# Patient Record
Sex: FEMALE | Race: WHITE | NOT HISPANIC OR LATINO | Employment: OTHER | URBAN - METROPOLITAN AREA
[De-identification: names, ages, dates, MRNs, and addresses within clinical notes are randomized per-mention and may not be internally consistent; named-entity substitution may affect disease eponyms.]

---

## 2023-07-03 LAB
ANION GAP IN SER/PLAS: 13 MMOL/L (ref 10–20)
CALCIUM (MG/DL) IN SER/PLAS: 9.2 MG/DL (ref 8.6–10.3)
CARBON DIOXIDE, TOTAL (MMOL/L) IN SER/PLAS: 24 MMOL/L (ref 21–32)
CHLORIDE (MMOL/L) IN SER/PLAS: 107 MMOL/L (ref 98–107)
CREATININE (MG/DL) IN SER/PLAS: 0.92 MG/DL (ref 0.5–1.05)
GFR FEMALE: 60 ML/MIN/1.73M2
GLUCOSE (MG/DL) IN SER/PLAS: 209 MG/DL (ref 74–99)
POTASSIUM (MMOL/L) IN SER/PLAS: 4.3 MMOL/L (ref 3.5–5.3)
SODIUM (MMOL/L) IN SER/PLAS: 140 MMOL/L (ref 136–145)
UREA NITROGEN (MG/DL) IN SER/PLAS: 26 MG/DL (ref 6–23)

## 2023-08-28 PROBLEM — B35.1 ONYCHOMYCOSIS: Status: ACTIVE | Noted: 2023-08-28

## 2023-08-28 PROBLEM — M20.11 HAV (HALLUX ABDUCTO VALGUS), RIGHT: Status: ACTIVE | Noted: 2023-08-28

## 2023-08-28 PROBLEM — H81.391 OTHER PERIPHERAL VERTIGO, RIGHT EAR: Status: ACTIVE | Noted: 2023-08-28

## 2023-08-28 PROBLEM — I34.0 MITRAL REGURGITATION: Status: ACTIVE | Noted: 2023-08-28

## 2023-08-28 PROBLEM — L60.1 ONYCHOLYSIS OF TOENAIL: Status: ACTIVE | Noted: 2023-08-28

## 2023-08-28 PROBLEM — E07.9 THYROID DISEASE: Status: ACTIVE | Noted: 2023-08-28

## 2023-08-28 PROBLEM — I71.40 AAA (ABDOMINAL AORTIC ANEURYSM) (CMS-HCC): Status: ACTIVE | Noted: 2023-08-28

## 2023-08-28 PROBLEM — I77.9 CAROTID ARTERIAL DISEASE (CMS-HCC): Status: ACTIVE | Noted: 2023-08-28

## 2023-08-28 PROBLEM — R63.4 WEIGHT LOSS, ABNORMAL: Status: ACTIVE | Noted: 2023-08-28

## 2023-08-28 PROBLEM — E78.5 HYPERLIPIDEMIA: Status: ACTIVE | Noted: 2023-08-28

## 2023-08-28 PROBLEM — M79.671 FOOT PAIN, BILATERAL: Status: ACTIVE | Noted: 2023-08-28

## 2023-08-28 PROBLEM — M79.672 FOOT PAIN, BILATERAL: Status: ACTIVE | Noted: 2023-08-28

## 2023-08-28 PROBLEM — M19.072 OSTEOARTHRITIS OF LEFT ANKLE AND FOOT: Status: ACTIVE | Noted: 2023-08-28

## 2023-08-28 PROBLEM — I25.10 CAD (CORONARY ARTERY DISEASE): Status: ACTIVE | Noted: 2023-08-28

## 2023-08-28 PROBLEM — M20.12 HAV (HALLUX ABDUCTO VALGUS), LEFT: Status: ACTIVE | Noted: 2023-08-28

## 2023-08-28 PROBLEM — I87.2 VENOUS INSUFFICIENCY (CHRONIC) (PERIPHERAL): Status: ACTIVE | Noted: 2023-08-28

## 2023-08-28 PROBLEM — R42 DIZZINESS: Status: ACTIVE | Noted: 2023-08-28

## 2023-08-28 PROBLEM — H90.3 SENSORINEURAL HEARING LOSS, BILATERAL: Status: ACTIVE | Noted: 2023-08-28

## 2023-08-28 PROBLEM — I61.4: Status: ACTIVE | Noted: 2023-08-28

## 2023-08-28 PROBLEM — I65.23 BILATERAL CAROTID ARTERY STENOSIS: Status: ACTIVE | Noted: 2023-08-28

## 2023-08-28 PROBLEM — M19.071 OSTEOARTHRITIS OF RIGHT ANKLE AND FOOT: Status: ACTIVE | Noted: 2023-08-28

## 2023-08-28 PROBLEM — I83.90 VARICOSE VEIN OF LEG: Status: ACTIVE | Noted: 2023-08-28

## 2023-08-28 PROBLEM — M54.2 NECK PAIN ON LEFT SIDE: Status: ACTIVE | Noted: 2023-08-28

## 2023-08-28 PROBLEM — L84 CALLUS OF FOOT: Status: ACTIVE | Noted: 2023-08-28

## 2023-08-28 PROBLEM — I50.32 CHRONIC DIASTOLIC CONGESTIVE HEART FAILURE (MULTI): Status: ACTIVE | Noted: 2023-08-28

## 2023-08-28 PROBLEM — I48.91 ATRIAL FIBRILLATION (MULTI): Status: ACTIVE | Noted: 2023-08-28

## 2023-08-28 PROBLEM — E78.00 HYPERCHOLESTEROLEMIA: Status: ACTIVE | Noted: 2023-08-28

## 2023-08-28 RX ORDER — TRIAMCINOLONE ACETONIDE 1 MG/G
CREAM TOPICAL
COMMUNITY

## 2023-08-28 RX ORDER — LORATADINE 10 MG/1
10 TABLET ORAL EVERY MORNING
COMMUNITY
End: 2023-10-19 | Stop reason: WASHOUT

## 2023-08-28 RX ORDER — FUROSEMIDE 40 MG/1
40 TABLET ORAL EVERY MORNING
COMMUNITY
End: 2023-12-08 | Stop reason: SDUPTHER

## 2023-08-28 RX ORDER — LEVOFLOXACIN 250 MG/1
1 TABLET ORAL EVERY MORNING
COMMUNITY
Start: 2022-06-15 | End: 2023-10-19 | Stop reason: WASHOUT

## 2023-08-28 RX ORDER — ATORVASTATIN CALCIUM 20 MG/1
20 TABLET, FILM COATED ORAL EVERY MORNING
COMMUNITY

## 2023-08-28 RX ORDER — ATENOLOL 50 MG/1
50 TABLET ORAL EVERY MORNING
COMMUNITY
End: 2023-10-19 | Stop reason: WASHOUT

## 2023-08-28 RX ORDER — WARFARIN 1 MG/1
1 TABLET ORAL
COMMUNITY
End: 2023-10-19 | Stop reason: WASHOUT

## 2023-08-28 RX ORDER — SENNOSIDES 8.6 MG/1
1 TABLET ORAL
COMMUNITY
Start: 2019-01-28 | End: 2023-10-19 | Stop reason: WASHOUT

## 2023-08-28 RX ORDER — CETIRIZINE HYDROCHLORIDE 5 MG/1
5 TABLET ORAL
COMMUNITY
End: 2023-10-19 | Stop reason: WASHOUT

## 2023-08-28 RX ORDER — MIRTAZAPINE 7.5 MG/1
7.5 TABLET, FILM COATED ORAL NIGHTLY
COMMUNITY
End: 2023-10-19 | Stop reason: WASHOUT

## 2023-08-28 RX ORDER — ALBUTEROL SULFATE 90 UG/1
2 AEROSOL, METERED RESPIRATORY (INHALATION) EVERY 6 HOURS PRN
COMMUNITY

## 2023-08-28 RX ORDER — OMEPRAZOLE 20 MG/1
20 CAPSULE, DELAYED RELEASE ORAL EVERY MORNING
COMMUNITY
Start: 2017-01-26

## 2023-08-28 RX ORDER — ERYTHROMYCIN 5 MG/G
OINTMENT OPHTHALMIC 4 TIMES DAILY
COMMUNITY
Start: 2022-12-14

## 2023-08-28 RX ORDER — REPAGLINIDE 1 MG/1
1 TABLET ORAL DAILY
COMMUNITY
Start: 2020-06-30 | End: 2023-10-19 | Stop reason: WASHOUT

## 2023-08-28 RX ORDER — ALBUTEROL SULFATE 0.83 MG/ML
2.5 SOLUTION RESPIRATORY (INHALATION) EVERY 6 HOURS
COMMUNITY

## 2023-08-28 RX ORDER — METOPROLOL TARTRATE 25 MG/1
25 TABLET, FILM COATED ORAL 2 TIMES DAILY
COMMUNITY
Start: 2023-05-08

## 2023-08-28 RX ORDER — WARFARIN 2 MG/1
2 TABLET ORAL
COMMUNITY
End: 2023-10-19 | Stop reason: WASHOUT

## 2023-08-28 RX ORDER — LANOLIN ALCOHOL/MO/W.PET/CERES
1000 CREAM (GRAM) TOPICAL EVERY MORNING
COMMUNITY
End: 2023-10-19 | Stop reason: WASHOUT

## 2023-08-28 RX ORDER — LOSARTAN POTASSIUM 25 MG/1
25 TABLET ORAL EVERY MORNING
COMMUNITY
End: 2023-10-19 | Stop reason: WASHOUT

## 2023-08-28 RX ORDER — NITROGLYCERIN 0.4 MG/1
0.4 TABLET SUBLINGUAL EVERY 5 MIN PRN
COMMUNITY

## 2023-08-28 RX ORDER — ALLOPURINOL 100 MG/1
1 TABLET ORAL DAILY
COMMUNITY
End: 2023-10-19 | Stop reason: WASHOUT

## 2023-08-28 RX ORDER — WARFARIN 2.5 MG/1
2.5 TABLET ORAL
COMMUNITY

## 2023-08-28 RX ORDER — COLCHICINE 0.6 MG/1
1 CAPSULE ORAL 2 TIMES DAILY
COMMUNITY
Start: 2022-12-13 | End: 2023-10-19 | Stop reason: WASHOUT

## 2023-08-28 RX ORDER — COLCHICINE 0.6 MG/1
0.3 TABLET ORAL
COMMUNITY
Start: 2022-12-20 | End: 2023-10-19 | Stop reason: WASHOUT

## 2023-08-28 RX ORDER — NAPROXEN SODIUM 220 MG/1
1200 TABLET ORAL
COMMUNITY

## 2023-08-28 RX ORDER — DEXTROMETHORPHAN HYDROBROMIDE, GUAIFENESIN 5; 100 MG/5ML; MG/5ML
650 LIQUID ORAL
COMMUNITY
Start: 2019-01-28

## 2023-08-28 RX ORDER — AMLODIPINE BESYLATE 5 MG/1
5 TABLET ORAL EVERY MORNING
COMMUNITY

## 2023-08-28 RX ORDER — FUROSEMIDE 20 MG/1
1 TABLET ORAL DAILY
COMMUNITY
End: 2023-10-19 | Stop reason: WASHOUT

## 2023-08-28 RX ORDER — PRAVASTATIN SODIUM 80 MG/1
80 TABLET ORAL EVERY MORNING
COMMUNITY
End: 2023-10-19 | Stop reason: WASHOUT

## 2023-08-28 RX ORDER — DEXAMETHASONE 6 MG/1
6 TABLET ORAL EVERY MORNING
COMMUNITY
Start: 2022-07-20 | End: 2023-10-19 | Stop reason: WASHOUT

## 2023-08-28 RX ORDER — LEVOTHYROXINE SODIUM 75 UG/1
75 TABLET ORAL EVERY MORNING
COMMUNITY
Start: 2023-02-07

## 2023-08-28 RX ORDER — FLUTICASONE PROPIONATE 50 MCG
1 SPRAY, SUSPENSION (ML) NASAL EVERY MORNING
COMMUNITY

## 2023-08-28 RX ORDER — SIMVASTATIN 80 MG/1
80 TABLET, FILM COATED ORAL EVERY EVENING
COMMUNITY
End: 2023-10-19 | Stop reason: WASHOUT

## 2023-10-18 ENCOUNTER — APPOINTMENT (OUTPATIENT)
Dept: RADIOLOGY | Facility: HOSPITAL | Age: 88
End: 2023-10-18
Payer: COMMERCIAL

## 2023-10-18 ENCOUNTER — HOSPITAL ENCOUNTER (EMERGENCY)
Facility: HOSPITAL | Age: 88
Discharge: HOME | End: 2023-10-18
Attending: EMERGENCY MEDICINE
Payer: COMMERCIAL

## 2023-10-18 VITALS
DIASTOLIC BLOOD PRESSURE: 78 MMHG | HEIGHT: 59 IN | OXYGEN SATURATION: 98 % | HEART RATE: 72 BPM | TEMPERATURE: 96.8 F | BODY MASS INDEX: 24.8 KG/M2 | RESPIRATION RATE: 16 BRPM | WEIGHT: 123 LBS | SYSTOLIC BLOOD PRESSURE: 127 MMHG

## 2023-10-18 DIAGNOSIS — Z79.01 ON CONTINUOUS ORAL ANTICOAGULATION: ICD-10-CM
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
## 2023-10-18 DIAGNOSIS — S09.90XA HEAD INJURY, INITIAL ENCOUNTER: ICD-10-CM

## 2023-10-18 LAB
ABO GROUP (TYPE) IN BLOOD: NORMAL
ALBUMIN SERPL BCP-MCNC: 4.3 G/DL (ref 3.4–5)
ALP SERPL-CCNC: 97 U/L (ref 33–136)
ALT SERPL W P-5'-P-CCNC: 13 U/L (ref 7–45)
ANION GAP SERPL CALC-SCNC: 13 MMOL/L (ref 10–20)
ANTIBODY SCREEN: NORMAL
AST SERPL W P-5'-P-CCNC: 25 U/L (ref 9–39)
BASOPHILS # BLD AUTO: 0.03 X10*3/UL (ref 0–0.1)
BASOPHILS NFR BLD AUTO: 0.4 %
BILIRUB SERPL-MCNC: 0.8 MG/DL (ref 0–1.2)
BUN SERPL-MCNC: 28 MG/DL (ref 6–23)
CALCIUM SERPL-MCNC: 9.3 MG/DL (ref 8.6–10.3)
CHLORIDE SERPL-SCNC: 102 MMOL/L (ref 98–107)
CO2 SERPL-SCNC: 27 MMOL/L (ref 21–32)
CREAT SERPL-MCNC: 0.9 MG/DL (ref 0.5–1.05)
CREAT SERPL-MCNC: 0.92 MG/DL (ref 0.5–1.05)
EOSINOPHIL # BLD AUTO: 0.12 X10*3/UL (ref 0–0.4)
EOSINOPHIL NFR BLD AUTO: 1.7 %
ERYTHROCYTE [DISTWIDTH] IN BLOOD BY AUTOMATED COUNT: 14.9 % (ref 11.5–14.5)
GFR SERPL CREATININE-BSD FRML MDRD: 60 ML/MIN/1.73M*2
GFR SERPL CREATININE-BSD FRML MDRD: 62 ML/MIN/1.73M*2
GLUCOSE SERPL-MCNC: 192 MG/DL (ref 74–99)
HCT VFR BLD AUTO: 38.7 % (ref 36–46)
HGB BLD-MCNC: 12.8 G/DL (ref 12–16)
IMM GRANULOCYTES # BLD AUTO: 0.03 X10*3/UL (ref 0–0.5)
IMM GRANULOCYTES NFR BLD AUTO: 0.4 % (ref 0–0.9)
INR PPP: 3.8 (ref 0.9–1.1)
LYMPHOCYTES # BLD AUTO: 0.86 X10*3/UL (ref 0.8–3)
LYMPHOCYTES NFR BLD AUTO: 12.5 %
MCH RBC QN AUTO: 31.1 PG (ref 26–34)
MCHC RBC AUTO-ENTMCNC: 33.1 G/DL (ref 32–36)
MCV RBC AUTO: 94 FL (ref 80–100)
MONOCYTES # BLD AUTO: 0.63 X10*3/UL (ref 0.05–0.8)
MONOCYTES NFR BLD AUTO: 9.2 %
NEUTROPHILS # BLD AUTO: 5.21 X10*3/UL (ref 1.6–5.5)
NEUTROPHILS NFR BLD AUTO: 75.8 %
NRBC BLD-RTO: 0 /100 WBCS (ref 0–0)
PLATELET # BLD AUTO: 171 X10*3/UL (ref 150–450)
PMV BLD AUTO: 9.8 FL (ref 7.5–11.5)
POTASSIUM SERPL-SCNC: 3.9 MMOL/L (ref 3.5–5.3)
PROT SERPL-MCNC: 6.3 G/DL (ref 6.4–8.2)
PROTHROMBIN TIME: 43 SECONDS (ref 9.8–12.8)
RBC # BLD AUTO: 4.11 X10*6/UL (ref 4–5.2)
RH FACTOR (ANTIGEN D): NORMAL
SODIUM SERPL-SCNC: 138 MMOL/L (ref 136–145)
WBC # BLD AUTO: 6.9 X10*3/UL (ref 4.4–11.3)

## 2023-10-18 PROCEDURE — 99285 EMERGENCY DEPT VISIT HI MDM: CPT | Performed by: EMERGENCY MEDICINE

## 2023-10-18 PROCEDURE — 71250 CT THORAX DX C-: CPT | Mod: MG

## 2023-10-18 PROCEDURE — 99285 EMERGENCY DEPT VISIT HI MDM: CPT | Mod: 25 | Performed by: EMERGENCY MEDICINE

## 2023-10-18 PROCEDURE — 99291 CRITICAL CARE FIRST HOUR: CPT | Performed by: EMERGENCY MEDICINE

## 2023-10-18 PROCEDURE — 85610 PROTHROMBIN TIME: CPT | Performed by: EMERGENCY MEDICINE

## 2023-10-18 PROCEDURE — 80053 COMPREHEN METABOLIC PANEL: CPT | Performed by: EMERGENCY MEDICINE

## 2023-10-18 PROCEDURE — 72125 CT NECK SPINE W/O DYE: CPT | Performed by: RADIOLOGY

## 2023-10-18 PROCEDURE — 70450 CT HEAD/BRAIN W/O DYE: CPT

## 2023-10-18 PROCEDURE — 82565 ASSAY OF CREATININE: CPT | Performed by: EMERGENCY MEDICINE

## 2023-10-18 PROCEDURE — 70450 CT HEAD/BRAIN W/O DYE: CPT | Performed by: RADIOLOGY

## 2023-10-18 PROCEDURE — 36415 COLL VENOUS BLD VENIPUNCTURE: CPT | Performed by: EMERGENCY MEDICINE

## 2023-10-18 PROCEDURE — 71250 CT THORAX DX C-: CPT | Performed by: RADIOLOGY

## 2023-10-18 PROCEDURE — 86900 BLOOD TYPING SEROLOGIC ABO: CPT | Performed by: EMERGENCY MEDICINE

## 2023-10-18 PROCEDURE — 72125 CT NECK SPINE W/O DYE: CPT | Mod: ME

## 2023-10-18 PROCEDURE — 85025 COMPLETE CBC W/AUTO DIFF WBC: CPT | Performed by: EMERGENCY MEDICINE

## 2023-10-18 ASSESSMENT — COLUMBIA-SUICIDE SEVERITY RATING SCALE - C-SSRS
6. HAVE YOU EVER DONE ANYTHING, STARTED TO DO ANYTHING, OR PREPARED TO DO ANYTHING TO END YOUR LIFE?: NO
1. IN THE PAST MONTH, HAVE YOU WISHED YOU WERE DEAD OR WISHED YOU COULD GO TO SLEEP AND NOT WAKE UP?: NO
2. HAVE YOU ACTUALLY HAD ANY THOUGHTS OF KILLING YOURSELF?: NO

## 2023-10-18 ASSESSMENT — LIFESTYLE VARIABLES
HAVE PEOPLE ANNOYED YOU BY CRITICIZING YOUR DRINKING: NO
REASON UNABLE TO ASSESS: YES
HAVE YOU EVER FELT YOU SHOULD CUT DOWN ON YOUR DRINKING: NO
EVER FELT BAD OR GUILTY ABOUT YOUR DRINKING: NO
EVER HAD A DRINK FIRST THING IN THE MORNING TO STEADY YOUR NERVES TO GET RID OF A HANGOVER: NO

## 2023-10-18 ASSESSMENT — PAIN - FUNCTIONAL ASSESSMENT: PAIN_FUNCTIONAL_ASSESSMENT: 0-10

## 2023-10-18 ASSESSMENT — PAIN SCALES - GENERAL: PAINLEVEL_OUTOF10: 0 - NO PAIN

## 2023-10-18 NOTE — ED PROVIDER NOTES
HPI   Chief Complaint   Patient presents with    Fall       88-year-old female who presents as a head injury and fall.  Patient is on Coumadin daily.  Patient states today she went to stand up when she fell backwards hitting her head on the floor.  She denies any LOC.  Patient does take Coumadin daily.  She is on chronic oxygen at home.  She denies any neck or back pain.  Denies any chest pain or shortness of breath.                          [place] Coma Scale Score: 15                  Patient History   Past Medical History:   Diagnosis Date    Old myocardial infarction     History of myocardial infarction    Personal history of other diseases of the circulatory system     History of coronary artery disease    Personal history of other diseases of the circulatory system 06/30/2020    History of hypertension    Personal history of other diseases of the circulatory system     History of hypertension    Personal history of other diseases of the musculoskeletal system and connective tissue     History of arthritis    Personal history of other diseases of the respiratory system     History of lung disease    Personal history of other endocrine, nutritional and metabolic disease     History of thyroid disorder    Personal history of other endocrine, nutritional and metabolic disease 09/01/2016    History of type 2 diabetes mellitus    Personal history of other endocrine, nutritional and metabolic disease     History of hyperlipidemia    Personal history of other endocrine, nutritional and metabolic disease     History of hypothyroidism    Unspecified cataract     Cataracts, bilateral    Unspecified hearing loss, right ear 01/12/2019    Hearing loss on right     Past Surgical History:   Procedure Laterality Date    APPENDECTOMY  09/01/2016    Appendectomy    CORONARY ARTERY BYPASS GRAFT  09/01/2016    CABG    OTHER SURGICAL HISTORY  09/01/2016    Endarterectomy Carotid Artery    TONSILLECTOMY  09/01/2016    Tonsillectomy  With Adenoidectomy     Family History   Problem Relation Name Age of Onset    Stroke Father      Hypertension Father      Diabetes Sister      Diabetes Brother      Hypertension Brother       Social History     Tobacco Use    Smoking status: Not on file    Smokeless tobacco: Not on file   Substance Use Topics    Alcohol use: Not on file    Drug use: Not on file       Physical Exam   ED Triage Vitals [10/18/23 1043]   Temp Heart Rate Resp BP   36 °C (96.8 °F) 68 16 147/87      SpO2 Temp Source Heart Rate Source Patient Position   93 % Temporal -- --      BP Location FiO2 (%)     -- --       Physical Exam  Constitutional:       Appearance: Normal appearance. She is normal weight.   HENT:      Head: Normocephalic.      Comments: Cephalhematoma noted to the left parietal area.  There is no abrasions or lacerations noted.     Nose: Nose normal.      Mouth/Throat:      Mouth: Mucous membranes are moist.      Pharynx: Oropharynx is clear.   Eyes:      Extraocular Movements: Extraocular movements intact.      Conjunctiva/sclera: Conjunctivae normal.      Pupils: Pupils are equal, round, and reactive to light.   Cardiovascular:      Rate and Rhythm: Normal rate and regular rhythm.   Pulmonary:      Effort: Pulmonary effort is normal.      Breath sounds: Normal breath sounds.   Abdominal:      General: Abdomen is flat. Bowel sounds are normal.      Palpations: Abdomen is soft.   Musculoskeletal:         General: Normal range of motion.      Cervical back: Normal range of motion and neck supple.   Skin:     General: Skin is warm and dry.      Capillary Refill: Capillary refill takes less than 2 seconds.   Neurological:      General: No focal deficit present.      Mental Status: She is alert.   Psychiatric:         Mood and Affect: Mood normal.         Behavior: Behavior normal.         Thought Content: Thought content normal.         Judgment: Judgment normal.       Labs Reviewed   CBC WITH AUTO DIFFERENTIAL - Abnormal        Result Value    WBC 6.9      nRBC 0.0      RBC 4.11      Hemoglobin 12.8      Hematocrit 38.7      MCV 94      MCH 31.1      MCHC 33.1      RDW 14.9 (*)     Platelets 171      MPV 9.8      Neutrophils % 75.8      Immature Granulocytes %, Automated 0.4      Lymphocytes % 12.5      Monocytes % 9.2      Eosinophils % 1.7      Basophils % 0.4      Neutrophils Absolute 5.21      Immature Granulocytes Absolute, Automated 0.03      Lymphocytes Absolute 0.86      Monocytes Absolute 0.63      Eosinophils Absolute 0.12      Basophils Absolute 0.03     COMPREHENSIVE METABOLIC PANEL - Abnormal    Glucose 192 (*)     Sodium 138      Potassium 3.9      Chloride 102      Bicarbonate 27      Anion Gap 13      Urea Nitrogen 28 (*)     Creatinine 0.90      eGFR 62      Calcium 9.3      Albumin 4.3      Alkaline Phosphatase 97      Total Protein 6.3 (*)     AST 25      Bilirubin, Total 0.8      ALT 13     PROTIME-INR - Abnormal    Protime 43.0 (*)     INR 3.8 (*)    CREATININE - Abnormal    Creatinine 0.92      eGFR 60 (*)    TYPE AND SCREEN    ABO TYPE O      Rh TYPE POS      ANTIBODY SCREEN NEG         CT chest wo IV contrast   Final Result   1. Moderate-size right and small left pleural effusions.   2. Pulmonary fibrosis, likely with acute superimposed interstitial   edema.   3. 3.9 x 4.0 cm ascending aortic aneurysm.   4. Possible pulmonary artery hypertension.        MACRO:   None        Signed by: Jessenia Boyd 10/18/2023 11:41 AM   Dictation workstation:   [id number]      CT head W O contrast trauma protocol   Final Result   No acute intracranial pathology.        MACRO:   None        Signed by: Jessenia Boyd 10/18/2023 11:34 AM   Dictation workstation:   [id number]      CT cervical spine wo IV contrast   Final Result   No evidence for an acute fracture of the cervical spine.   3 mm anterior subluxation of C4 with respect to C5, likely   degenerative. Mild curvature of the cervical spine with convexity to   the right,  seen on 05/06/2023. This could represent scoliosis or   recurrent torticollis.        MACRO:   None        Signed by: Jessenia Andersonier 10/18/2023 11:32 AM   Dictation workstation:   [id number]            ED Course & MDM   Diagnoses as of 10/18/23 1333   Fall, initial encounter   Head injury, initial encounter   On continuous oral anticoagulation       Medical Decision Making  Emergency department course, CT of the head and cervical spine were obtained which showed no acute traumatic injuries.  Chest CT was obtained secondary to visualizing a pleural effusion on the CT of the neck.  There is a moderate size right-sided pleural effusion and a small left.  Patient is on Lasix that is being adjusted by cardiology.  She does wear chronic oxygen at home.  Laboratory studies were obtained and reviewed hemoglobin of 12.3 with a INR of 3.8.  I feel comfortable discharging her home to follow-up as an outpatient.  Family is agreeable with this plan.        Procedure  Critical Care    Performed by: Renee Chaparro DO  Authorized by: Renee Chaparro DO    Critical care provider statement:     Critical care time (minutes):  35    Critical care was necessary to treat or prevent imminent or life-threatening deterioration of the following conditions:  Trauma    Critical care was time spent personally by me on the following activities:  Development of treatment plan with patient or surrogate, evaluation of patient's response to treatment and examination of patient    I assumed direction of critical care for this patient from another provider in my specialty: no         Renee Chaparro DO  10/18/23 1428

## 2023-10-18 NOTE — ED TRIAGE NOTES
Pt in ER with c/o fall. Pt was at the coumadin clinic got dizzy fell backwards and hit her head. Pt is on blood thinners denies LOC but did hit her head.

## 2023-10-19 ENCOUNTER — OFFICE VISIT (OUTPATIENT)
Dept: CARDIOLOGY | Facility: CLINIC | Age: 88
End: 2023-10-19
Payer: COMMERCIAL

## 2023-10-19 VITALS
HEART RATE: 71 BPM | HEIGHT: 64 IN | SYSTOLIC BLOOD PRESSURE: 126 MMHG | WEIGHT: 126.4 LBS | DIASTOLIC BLOOD PRESSURE: 82 MMHG | OXYGEN SATURATION: 93 % | BODY MASS INDEX: 21.58 KG/M2

## 2023-10-19 DIAGNOSIS — Z98.890 S/P BILATERAL CAROTID ENDARTERECTOMY: ICD-10-CM

## 2023-10-19 DIAGNOSIS — I25.10 CORONARY ARTERY DISEASE INVOLVING NATIVE CORONARY ARTERY OF NATIVE HEART WITHOUT ANGINA PECTORIS: ICD-10-CM
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
## 2023-10-19 DIAGNOSIS — I34.0 SEVERE MITRAL REGURGITATION: ICD-10-CM

## 2023-10-19 DIAGNOSIS — E78.00 HYPERCHOLESTEROLEMIA: ICD-10-CM

## 2023-10-19 DIAGNOSIS — I48.11 LONGSTANDING PERSISTENT ATRIAL FIBRILLATION (MULTI): Primary | ICD-10-CM

## 2023-10-19 DIAGNOSIS — I50.32 CHRONIC DIASTOLIC CONGESTIVE HEART FAILURE (MULTI): ICD-10-CM

## 2023-10-19 DIAGNOSIS — I10 PRIMARY HYPERTENSION: ICD-10-CM

## 2023-10-19 PROCEDURE — 1159F MED LIST DOCD IN RCRD: CPT | Performed by: INTERNAL MEDICINE

## 2023-10-19 PROCEDURE — 3079F DIAST BP 80-89 MM HG: CPT | Performed by: INTERNAL MEDICINE

## 2023-10-19 PROCEDURE — 3074F SYST BP LT 130 MM HG: CPT | Performed by: INTERNAL MEDICINE

## 2023-10-19 PROCEDURE — 1036F TOBACCO NON-USER: CPT | Performed by: INTERNAL MEDICINE

## 2023-10-19 PROCEDURE — 99214 OFFICE O/P EST MOD 30 MIN: CPT | Performed by: INTERNAL MEDICINE

## 2023-10-19 PROCEDURE — 1160F RVW MEDS BY RX/DR IN RCRD: CPT | Performed by: INTERNAL MEDICINE

## 2023-10-19 PROCEDURE — 1126F AMNT PAIN NOTED NONE PRSNT: CPT | Performed by: INTERNAL MEDICINE

## 2023-10-19 RX ORDER — CETIRIZINE HYDROCHLORIDE 10 MG/1
TABLET ORAL
COMMUNITY
Start: 2023-07-03

## 2023-10-19 RX ORDER — POLYETHYLENE GLYCOL 3350 17 G/17G
POWDER, FOR SOLUTION ORAL
COMMUNITY
Start: 2023-02-06

## 2023-10-19 RX ORDER — ESCITALOPRAM OXALATE 5 MG/1
5 TABLET, FILM COATED ORAL
COMMUNITY
Start: 2023-09-27

## 2023-10-19 NOTE — PROGRESS NOTES
Had mechanical fall 10/17/23 with head trauma seen in [facility] ER no acute cranial bleed.  Coughing less but has hypoxia with acitivty      Review of Systems   All other systems reviewed and are negative.       Physical Exam  HENT:      Head: Normocephalic.   Cardiovascular:      Rate and Rhythm: Rhythm irregular.   Pulmonary:      Breath sounds: Normal breath sounds.   Abdominal:      Palpations: Abdomen is soft.   Musculoskeletal:      Right lower le+ Pitting Edema present.      Left lower le+ Edema present.   Neurological:      Mental Status: She is alert.          Problem List Items Addressed This Visit          Cardiac and Vasculature    Atrial fibrillation (CMS/HCC) - Primary    CAD (coronary artery disease)    Current Assessment & Plan     S/P CABG 1988 [facility] by Dr. Solomon         Chronic diastolic congestive heart failure (CMS/HCC)    Relevant Orders    Follow Up In Cardiology    Follow Up In Cardiology    Basic metabolic panel    Hypercholesterolemia    Severe mitral regurgitation    Current Assessment & Plan     Declined by Dr. Richey for MitraClip 22         S/p bilateral carotid endarterectomy    Primary hypertension       Musculoskeletal and Injuries    Accident due to mechanical fall without injury

## 2023-10-20 ENCOUNTER — APPOINTMENT (OUTPATIENT)
Dept: PHARMACY | Facility: CLINIC | Age: 88
End: 2023-10-20
Payer: COMMERCIAL

## 2023-10-21 PROBLEM — Z98.890 S/P BILATERAL CAROTID ENDARTERECTOMY: Status: ACTIVE | Noted: 2023-10-21
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
## 2023-10-21 PROBLEM — I10 PRIMARY HYPERTENSION: Status: ACTIVE | Noted: 2023-10-21

## 2023-11-01 ENCOUNTER — LAB (OUTPATIENT)
Dept: LAB | Facility: LAB | Age: 88
End: 2023-11-01
Payer: COMMERCIAL

## 2023-11-01 DIAGNOSIS — I50.32 CHRONIC DIASTOLIC CONGESTIVE HEART FAILURE (MULTI): ICD-10-CM

## 2023-11-01 LAB
ANION GAP SERPL CALC-SCNC: 12 MMOL/L (ref 10–20)
BUN SERPL-MCNC: 22 MG/DL (ref 6–23)
CALCIUM SERPL-MCNC: 9.4 MG/DL (ref 8.6–10.3)
CHLORIDE SERPL-SCNC: 103 MMOL/L (ref 98–107)
CO2 SERPL-SCNC: 31 MMOL/L (ref 21–32)
CREAT SERPL-MCNC: 1.14 MG/DL (ref 0.5–1.05)
GFR SERPL CREATININE-BSD FRML MDRD: 46 ML/MIN/1.73M*2
GLUCOSE SERPL-MCNC: 158 MG/DL (ref 74–99)
POTASSIUM SERPL-SCNC: 4.1 MMOL/L (ref 3.5–5.3)
SODIUM SERPL-SCNC: 142 MMOL/L (ref 136–145)

## 2023-11-01 PROCEDURE — 36415 COLL VENOUS BLD VENIPUNCTURE: CPT

## 2023-11-01 PROCEDURE — 80048 BASIC METABOLIC PNL TOTAL CA: CPT

## 2023-11-16 ENCOUNTER — APPOINTMENT (OUTPATIENT)
Dept: CARDIOLOGY | Facility: CLINIC | Age: 88
End: 2023-11-16
Payer: COMMERCIAL

## 2023-11-20 ENCOUNTER — APPOINTMENT (OUTPATIENT)
Dept: CARDIOLOGY | Facility: CLINIC | Age: 88
End: 2023-11-20
Payer: COMMERCIAL

## 2023-11-30 ENCOUNTER — APPOINTMENT (OUTPATIENT)
Dept: CARDIOLOGY | Facility: CLINIC | Age: 88
End: 2023-11-30
Payer: COMMERCIAL

## 2023-12-01 PROBLEM — Z86.73 S/P STROKE DUE TO CEREBROVASCULAR DISEASE: Status: ACTIVE | Noted: 2023-12-01

## 2023-12-01 PROBLEM — U07.1 COVID-19 VIRUS INFECTION: Status: ACTIVE | Noted: 2023-12-01

## 2023-12-01 PROBLEM — H26.9 BILATERAL CATARACTS: Status: ACTIVE | Noted: 2023-12-01

## 2023-12-01 PROBLEM — U07.1 PNEUMONIA DUE TO COVID-19 VIRUS: Status: ACTIVE | Noted: 2023-12-01

## 2023-12-01 PROBLEM — J96.10 CHRONIC RESPIRATORY FAILURE (MULTI): Status: ACTIVE | Noted: 2023-09-27

## 2023-12-01 PROBLEM — G47.34 IDIOPATHIC SLEEP RELATED NONOBSTRUCTIVE ALVEOLAR HYPOVENTILATION: Status: ACTIVE | Noted: 2020-01-28

## 2023-12-01 PROBLEM — N30.20 CHRONIC CYSTITIS: Status: ACTIVE | Noted: 2023-12-01

## 2023-12-01 PROBLEM — K21.9 GERD WITHOUT ESOPHAGITIS: Status: ACTIVE | Noted: 2023-12-01

## 2023-12-01 PROBLEM — S52.614D CLOSED NONDISPLACED FRACTURE OF STYLOID PROCESS OF RIGHT ULNA WITH ROUTINE HEALING: Status: ACTIVE | Noted: 2023-06-14

## 2023-12-01 PROBLEM — S52.501D CLOSED FRACTURE OF LOWER END OF RIGHT RADIUS WITH ROUTINE HEALING: Status: ACTIVE | Noted: 2023-06-14

## 2023-12-01 PROBLEM — N17.9 ACUTE RENAL FAILURE (CMS-HCC): Status: ACTIVE | Noted: 2023-12-01

## 2023-12-01 PROBLEM — Z86.39 H/O: HYPOTHYROIDISM: Status: ACTIVE | Noted: 2023-12-01

## 2023-12-01 PROBLEM — M79.673 FOOT PAIN: Status: ACTIVE | Noted: 2023-12-01

## 2023-12-01 PROBLEM — R91.8 LUNG MASS: Status: ACTIVE | Noted: 2023-12-01

## 2023-12-01 PROBLEM — I25.2 HISTORY OF MYOCARDIAL INFARCTION: Status: ACTIVE | Noted: 2023-12-01

## 2023-12-01 PROBLEM — G47.00 INSOMNIA: Status: ACTIVE | Noted: 2020-01-28

## 2023-12-01 PROBLEM — J12.82 PNEUMONIA DUE TO COVID-19 VIRUS: Status: ACTIVE | Noted: 2023-12-01

## 2023-12-01 PROBLEM — H91.91 HEARING LOSS OF RIGHT EAR: Status: ACTIVE | Noted: 2023-12-01

## 2023-12-01 PROBLEM — E03.9 HYPOTHYROIDISM: Status: ACTIVE | Noted: 2023-08-28

## 2023-12-01 PROBLEM — K64.8 INTERNAL HEMORRHOID: Status: ACTIVE | Noted: 2023-12-01

## 2023-12-01 PROBLEM — R05.3 CHRONIC COUGH: Status: ACTIVE | Noted: 2023-12-01

## 2023-12-01 PROBLEM — L60.1 ONYCHOLYSIS: Status: RESOLVED | Noted: 2023-12-01 | Resolved: 2023-12-01

## 2023-12-01 PROBLEM — E79.0 ELEVATED URIC ACID IN BLOOD: Status: ACTIVE | Noted: 2023-12-01

## 2023-12-01 PROBLEM — H81.399 PERIPHERAL VERTIGO: Status: ACTIVE | Noted: 2019-02-18

## 2023-12-01 PROBLEM — I87.2 STASIS DERMATITIS: Status: ACTIVE | Noted: 2023-12-01

## 2023-12-04 ENCOUNTER — OFFICE VISIT (OUTPATIENT)
Dept: CARDIOLOGY | Facility: CLINIC | Age: 88
End: 2023-12-04
Payer: COMMERCIAL

## 2023-12-04 VITALS — SYSTOLIC BLOOD PRESSURE: 122 MMHG | OXYGEN SATURATION: 94 % | HEART RATE: 72 BPM | DIASTOLIC BLOOD PRESSURE: 80 MMHG

## 2023-12-04 DIAGNOSIS — I25.2 HISTORY OF MYOCARDIAL INFARCTION: ICD-10-CM

## 2023-12-04 DIAGNOSIS — E78.00 PURE HYPERCHOLESTEROLEMIA: ICD-10-CM

## 2023-12-04 DIAGNOSIS — Z98.890 S/P BILATERAL CAROTID ENDARTERECTOMY: ICD-10-CM

## 2023-12-04 DIAGNOSIS — E03.9 HYPOTHYROIDISM, UNSPECIFIED TYPE: ICD-10-CM

## 2023-12-04 DIAGNOSIS — E11.42 DM TYPE 2 WITH DIABETIC PERIPHERAL NEUROPATHY (MULTI): Chronic | ICD-10-CM

## 2023-12-04 DIAGNOSIS — I34.0 NONRHEUMATIC MITRAL VALVE REGURGITATION: Primary | ICD-10-CM

## 2023-12-04 DIAGNOSIS — I10 HTN (HYPERTENSION), BENIGN: ICD-10-CM

## 2023-12-04 DIAGNOSIS — I25.10 CORONARY ARTERY DISEASE INVOLVING NATIVE CORONARY ARTERY OF NATIVE HEART WITHOUT ANGINA PECTORIS: ICD-10-CM

## 2023-12-04 DIAGNOSIS — I50.32 CHRONIC DIASTOLIC CONGESTIVE HEART FAILURE (MULTI): ICD-10-CM

## 2023-12-04 DIAGNOSIS — Z95.1 HX OF CABG: ICD-10-CM

## 2023-12-04 PROCEDURE — 1036F TOBACCO NON-USER: CPT | Performed by: INTERNAL MEDICINE

## 2023-12-04 PROCEDURE — 1126F AMNT PAIN NOTED NONE PRSNT: CPT | Performed by: INTERNAL MEDICINE

## 2023-12-04 PROCEDURE — 3074F SYST BP LT 130 MM HG: CPT | Performed by: INTERNAL MEDICINE

## 2023-12-04 PROCEDURE — 1159F MED LIST DOCD IN RCRD: CPT | Performed by: INTERNAL MEDICINE

## 2023-12-04 PROCEDURE — 3078F DIAST BP <80 MM HG: CPT | Performed by: INTERNAL MEDICINE

## 2023-12-04 PROCEDURE — 1160F RVW MEDS BY RX/DR IN RCRD: CPT | Performed by: INTERNAL MEDICINE

## 2023-12-04 PROCEDURE — 99214 OFFICE O/P EST MOD 30 MIN: CPT | Performed by: INTERNAL MEDICINE

## 2023-12-04 NOTE — PROGRESS NOTES
Subjective  Soledad Melendrez  is a 88 y.o. year old female who presents for F/U.  Her nightime cough has improved.  She has had no furthewr falling.  Notes back pain.  Notes mild LARA when she walks, no chest pain, no palapitions, no edema    Blood pressure 122/80, pulse 72, SpO2 94 %.   Influenza virus vaccines, Lisinopril, Morphine, Nsaids (non-steroidal anti-inflammatory drug), Pneumococcal vaccine, and Lovastatin  Past Medical History:   Diagnosis Date    Old myocardial infarction     History of myocardial infarction    Onycholysis 12/01/2023    Personal history of other diseases of the circulatory system     History of coronary artery disease    Personal history of other diseases of the circulatory system 06/30/2020    History of hypertension    Personal history of other diseases of the circulatory system     History of hypertension    Personal history of other diseases of the musculoskeletal system and connective tissue     History of arthritis    Personal history of other diseases of the respiratory system     History of lung disease    Personal history of other endocrine, nutritional and metabolic disease     History of thyroid disorder    Personal history of other endocrine, nutritional and metabolic disease 09/01/2016    History of type 2 diabetes mellitus    Personal history of other endocrine, nutritional and metabolic disease     History of hyperlipidemia    Personal history of other endocrine, nutritional and metabolic disease     History of hypothyroidism    Unspecified cataract     Cataracts, bilateral    Unspecified hearing loss, right ear 01/12/2019    Hearing loss on right     Past Surgical History:   Procedure Laterality Date    APPENDECTOMY  09/01/2016    Appendectomy    CORONARY ARTERY BYPASS GRAFT  09/01/2016    CABG    OTHER SURGICAL HISTORY  09/01/2016    Endarterectomy Carotid Artery    TONSILLECTOMY  09/01/2016    Tonsillectomy With Adenoidectomy     Family History   Problem  Relation Name Age of Onset    Stroke Father      Hypertension Father      Diabetes Sister      Diabetes Brother      Hypertension Brother       @SOC    Current Outpatient Medications   Medication Sig Dispense Refill    furosemide (Lasix) 40 mg tablet Take 1 tablet (40 mg) by mouth once daily in the morning. 20MG  IN PM      acetaminophen (Tylenol Arthritis Pain) 650 mg ER tablet Take 1 tablet (650 mg) by mouth.      albuterol 2.5 mg /3 mL (0.083 %) nebulizer solution Take 3 mL (2.5 mg) by nebulization every 6 hours.      albuterol 90 mcg/actuation inhaler Inhale 2 puffs every 6 hours if needed for wheezing.      amLODIPine (Norvasc) 5 mg tablet Take 1 tablet (5 mg) by mouth once daily in the morning.      atorvastatin (Lipitor) 20 mg tablet Take 1 tablet (20 mg) by mouth once daily in the morning.      cetirizine (ZyrTEC) 10 mg tablet 1 tablet DAILY (route: oral)      erythromycin (Romycin) 5 mg/gram (0.5 %) ophthalmic ointment Apply to left eye 4 times a day.  APPLY 1/2 THIN LAYER      fluticasone (Flonase) 50 mcg/actuation nasal spray Administer 1 spray into each nostril once daily in the morning. Shake gently. Before first use, prime pump. After use, clean tip and replace cap      levothyroxine (Synthroid, Levoxyl) 75 mcg tablet Take 1 tablet (75 mcg) by mouth once daily in the morning.      Lexapro 5 mg tablet 1 tablet (5 mg).      metoprolol tartrate (Lopressor) 25 mg tablet Take 1 tablet (25 mg) by mouth twice a day. Take in the morning and before bedtime      nitroglycerin (Nitrostat) 0.4 mg SL tablet Place 1 tablet (0.4 mg) under the tongue every 5 minutes if needed for chest pain.      omega 3-dha-epa-fish oil (Fish OiL) 1,200 (144-216) mg capsule Take 1 capsule (1,200 mg) by mouth.      omeprazole (PriLOSEC) 20 mg DR capsule Take 1 capsule (20 mg) by mouth once daily in the morning.      polyethylene glycol (Glycolax, Miralax) 17 gram/dose powder DISSOLVE 17 GRAMS IN WATER AND DRINK ONCE DAILY       triamcinolone (Kenalog) 0.1 % cream Apply topically.      warfarin (Coumadin) 2.5 mg tablet Take 1 tablet (2.5 mg) by mouth.       No current facility-administered medications for this visit.        ROS  Review of Systems   All other systems reviewed and are negative.      Physical Exam  Physical Exam  Constitutional:       Appearance: Normal appearance.   HENT:      Head: Normocephalic and atraumatic.   Eyes:      Extraocular Movements: Extraocular movements intact.      Pupils: Pupils are equal, round, and reactive to light.   Cardiovascular:      Heart sounds: S1 normal and S2 normal. Heart sounds are distant.   Musculoskeletal:      Right lower leg: No edema.      Left lower leg: No edema.   Skin:     General: Skin is warm and dry.   Neurological:      Mental Status: She is alert.          EKG  No results found for this or any previous visit (from the past 4464 hour(s)).    Problem List Items Addressed This Visit       CAD (coronary artery disease)    Chronic diastolic congestive heart failure (CMS/HCC)    Relevant Orders    Follow Up In Cardiology    Hyperlipidemia    Mitral valve regurgitation - Primary     Refused for MitraClip by Dr. 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  Return 3 months with EKG      Calin Hernandez MD    no

## 2023-12-08 DIAGNOSIS — I50.32 CHRONIC DIASTOLIC CONGESTIVE HEART FAILURE (MULTI): ICD-10-CM

## 2023-12-13 RX ORDER — FUROSEMIDE 20 MG/1
TABLET ORAL
Qty: 180 TABLET | Refills: 3 | Status: SHIPPED | OUTPATIENT
Start: 2023-12-13 | End: 2024-06-10 | Stop reason: SDUPTHER

## 2023-12-15 ENCOUNTER — TELEPHONE (OUTPATIENT)
Dept: CARDIOLOGY | Facility: CLINIC | Age: 88
End: 2023-12-15
Payer: COMMERCIAL

## 2023-12-15 NOTE — TELEPHONE ENCOUNTER
Good morning,  Patient was at office because she is confused with the amount of Furosemide she supposed to be taken. Dr. Hernandez told her to take 2 in the morning, 1 in the afternoon. The pharmacy told her something different, take 2 in the morning and 1 at night every other day, and she do not know what to do. Please contact her daughter   Natividad at [phone]. She wants to know how she is supposed to take her medication.  Thanks  Daphne

## 2024-01-03 ENCOUNTER — APPOINTMENT (OUTPATIENT)
Dept: RADIOLOGY | Facility: HOSPITAL | Age: 89
End: 2024-01-03
Payer: COMMERCIAL

## 2024-01-03 ENCOUNTER — HOSPITAL ENCOUNTER (EMERGENCY)
Facility: HOSPITAL | Age: 89
Discharge: HOME | End: 2024-01-03
Payer: COMMERCIAL

## 2024-01-03 ENCOUNTER — APPOINTMENT (OUTPATIENT)
Dept: CARDIOLOGY | Facility: HOSPITAL | Age: 89
End: 2024-01-03
Payer: COMMERCIAL

## 2024-01-03 VITALS
SYSTOLIC BLOOD PRESSURE: 151 MMHG | RESPIRATION RATE: 20 BRPM | WEIGHT: 123 LBS | DIASTOLIC BLOOD PRESSURE: 77 MMHG | TEMPERATURE: 97 F | OXYGEN SATURATION: 100 % | HEIGHT: 61 IN | HEART RATE: 74 BPM | BODY MASS INDEX: 23.22 KG/M2

## 2024-01-03 LAB
ALBUMIN SERPL BCP-MCNC: 4.5 G/DL (ref 3.4–5)
ALP SERPL-CCNC: 93 U/L (ref 33–136)
ALT SERPL W P-5'-P-CCNC: 12 U/L (ref 7–45)
ANION GAP SERPL CALC-SCNC: 14 MMOL/L (ref 10–20)
APTT PPP: 35 SECONDS (ref 27–38)
AST SERPL W P-5'-P-CCNC: 32 U/L (ref 9–39)
BASOPHILS # BLD AUTO: 0.03 X10*3/UL (ref 0–0.1)
BASOPHILS NFR BLD AUTO: 0.4 %
BILIRUB SERPL-MCNC: 1.6 MG/DL (ref 0–1.2)
BNP SERPL-MCNC: 541 PG/ML (ref 0–99)
BUN SERPL-MCNC: 18 MG/DL (ref 6–23)
CALCIUM SERPL-MCNC: 9.6 MG/DL (ref 8.6–10.3)
CARDIAC TROPONIN I PNL SERPL HS: 16 NG/L (ref 0–13)
CHLORIDE SERPL-SCNC: 100 MMOL/L (ref 98–107)
CO2 SERPL-SCNC: 29 MMOL/L (ref 21–32)
CREAT SERPL-MCNC: 0.82 MG/DL (ref 0.5–1.05)
EOSINOPHIL # BLD AUTO: 0.18 X10*3/UL (ref 0–0.4)
EOSINOPHIL NFR BLD AUTO: 2.4 %
ERYTHROCYTE [DISTWIDTH] IN BLOOD BY AUTOMATED COUNT: 16.9 % (ref 11.5–14.5)
FLUAV RNA RESP QL NAA+PROBE: NOT DETECTED
FLUBV RNA RESP QL NAA+PROBE: NOT DETECTED
GFR SERPL CREATININE-BSD FRML MDRD: 69 ML/MIN/1.73M*2
GLUCOSE SERPL-MCNC: 120 MG/DL (ref 74–99)
HCT VFR BLD AUTO: 42.6 % (ref 36–46)
HGB BLD-MCNC: 14.2 G/DL (ref 12–16)
IMM GRANULOCYTES # BLD AUTO: 0.03 X10*3/UL (ref 0–0.5)
IMM GRANULOCYTES NFR BLD AUTO: 0.4 % (ref 0–0.9)
INR PPP: 1.9 (ref 0.9–1.1)
LYMPHOCYTES # BLD AUTO: 1.45 X10*3/UL (ref 0.8–3)
LYMPHOCYTES NFR BLD AUTO: 19.4 %
MAGNESIUM SERPL-MCNC: 2.08 MG/DL (ref 1.6–2.4)
MCH RBC QN AUTO: 31.5 PG (ref 26–34)
MCHC RBC AUTO-ENTMCNC: 33.3 G/DL (ref 32–36)
MCV RBC AUTO: 95 FL (ref 80–100)
MONOCYTES # BLD AUTO: 0.84 X10*3/UL (ref 0.05–0.8)
MONOCYTES NFR BLD AUTO: 11.2 %
NEUTROPHILS # BLD AUTO: 4.94 X10*3/UL (ref 1.6–5.5)
NEUTROPHILS NFR BLD AUTO: 66.2 %
NRBC BLD-RTO: 0 /100 WBCS (ref 0–0)
PLATELET # BLD AUTO: 176 X10*3/UL (ref 150–450)
POTASSIUM SERPL-SCNC: 4.1 MMOL/L (ref 3.5–5.3)
PROT SERPL-MCNC: 7.5 G/DL (ref 6.4–8.2)
PROTHROMBIN TIME: 21.3 SECONDS (ref 9.8–12.8)
RBC # BLD AUTO: 4.51 X10*6/UL (ref 4–5.2)
RSV RNA RESP QL NAA+PROBE: NOT DETECTED
SARS-COV-2 RNA RESP QL NAA+PROBE: NOT DETECTED
SODIUM SERPL-SCNC: 139 MMOL/L (ref 136–145)
WBC # BLD AUTO: 7.5 X10*3/UL (ref 4.4–11.3)

## 2024-01-03 PROCEDURE — 99283 EMERGENCY DEPT VISIT LOW MDM: CPT | Mod: 25

## 2024-01-03 PROCEDURE — 85025 COMPLETE CBC W/AUTO DIFF WBC: CPT | Performed by: PHYSICIAN ASSISTANT

## 2024-01-03 PROCEDURE — 87637 SARSCOV2&INF A&B&RSV AMP PRB: CPT | Performed by: PHYSICIAN ASSISTANT

## 2024-01-03 PROCEDURE — 84484 ASSAY OF TROPONIN QUANT: CPT | Performed by: PHYSICIAN ASSISTANT

## 2024-01-03 PROCEDURE — 80053 COMPREHEN METABOLIC PANEL: CPT | Performed by: PHYSICIAN ASSISTANT

## 2024-01-03 PROCEDURE — 99285 EMERGENCY DEPT VISIT HI MDM: CPT

## 2024-01-03 PROCEDURE — 93005 ELECTROCARDIOGRAM TRACING: CPT

## 2024-01-03 PROCEDURE — 85610 PROTHROMBIN TIME: CPT | Performed by: PHYSICIAN ASSISTANT

## 2024-01-03 PROCEDURE — 83735 ASSAY OF MAGNESIUM: CPT | Performed by: PHYSICIAN ASSISTANT

## 2024-01-03 PROCEDURE — 36415 COLL VENOUS BLD VENIPUNCTURE: CPT | Performed by: PHYSICIAN ASSISTANT

## 2024-01-03 PROCEDURE — 71046 X-RAY EXAM CHEST 2 VIEWS: CPT | Performed by: RADIOLOGY

## 2024-01-03 PROCEDURE — 83880 ASSAY OF NATRIURETIC PEPTIDE: CPT | Performed by: PHYSICIAN ASSISTANT

## 2024-01-03 PROCEDURE — 71046 X-RAY EXAM CHEST 2 VIEWS: CPT

## 2024-01-03 ASSESSMENT — PAIN SCALES - GENERAL: PAINLEVEL_OUTOF10: 0 - NO PAIN

## 2024-01-03 ASSESSMENT — PAIN - FUNCTIONAL ASSESSMENT: PAIN_FUNCTIONAL_ASSESSMENT: 0-10

## 2024-01-03 NOTE — ED TRIAGE NOTES
TRIAGE NOTE   I saw the patient as the Clinician in Triage and performed a brief history and physical exam, established acuity, and ordered appropriate tests to develop basic plan of care. Patient will be seen by an FEDE, resident and/or physician who will independently evaluate the patient. Please see subsequent provider notes for further details and disposition.     Brief HPI: In brief, Soledad Melendrez is a 88 y.o. female that presents for increasing shortness of breath.  Patient usually is on 2 L of oxygen but now she has bumped it up to 4 5.  She gets very winded when moving around.  She does have heart issues and is currently on Coumadin.  She also has issues with heart failure and statesDr. Rosana  wanted her to try may be backing down off some Lasix.  Patient notes that when she backed down a bit is when she felt worse.  No increase in leg swelling.  He is denying any chest pain however.  She is urinating well and moving her bowels appropriately.      Focused PE:  - Constitutional: Alert and oriented x3.  In no acute distress, well-nourished and hydrated.  Cooperative.  - Skin: Pink, warm and dry.    -Neurological: Neurologically intact.    - Musculoskeletal: DEMETRI x4, Normal gait. MSP´s intact.  No lower extremity edema   - Cardiac: Regular rate rhythm.  - Pulmonary: Lungs clear bilateral except with one wheeze auscultated  No stridor or accessory muscle use.  - Abdomen: Abdomen soft and nontender with bowel sounds.  No rebound or guarding.  No CVA tenderness.    Note, physical exam may be limited by patient positioning sitting up in a chair.    Plan/MDM: I examined the patient in triage due to high volumes in the ER.  Labs, testing , and initial imaging based upon reported CC and focused exam      - For the remainder of the patient's workup and ED course, please see the main ED provider note. We discussed need for diagnostic testing including laboratory studies and imaging. We also discussed that they  may be asked to wait in the waiting room while these tests are pending. They understand that if they choose to leave without having the testing completed or resulted that we cannot rule out acute life threatening illnesses and the risks involved could lead to worsening condition, permanent disability or even death

## 2024-01-03 NOTE — ED TRIAGE NOTES
PT. WITH C/O SOB, STATES INCREASING HER OXYGEN AT HOME TO 4-5L. DENIES CHEST PAIN. FAMILY STATES PT. WITH PULMONARY EFFUSION, LASIX INCREASED TO TWO PILLS. FAMILY STATES PT. TOOL ONLY ONE LAST NIGHT. FAMILY STATES PT. ALSO COUGHING UP BLOOD, STATES INR WAS >8 SO STOPPED COUMADIN FOR 3 DAYS.

## 2024-01-04 LAB
Q ONSET: 217 MS
QRS COUNT: 12 BEATS
QRS DURATION: 106 MS
QT INTERVAL: 410 MS
QTC CALCULATION(BAZETT): 455 MS
QTC FREDERICIA: 440 MS
R AXIS: 116 DEGREES
T AXIS: -50 DEGREES
T OFFSET: 422 MS
VENTRICULAR RATE: 74 BPM

## 2024-01-04 NOTE — PROGRESS NOTES
Patient was seen initially in triage.  Patient is leaving without treatment complete.  Her labs were returned and mostly unremarkable.  Daughter states that her BNP is typically in the 300s it is in the 500s.  Of note chest x-ray did show a pleural effusion and Fuhs interstitial infiltrates.  States this is typical for her.  They are comfortable taking her home.  They believe that her oxygen tank is not working well as even though it is showing 4 to 6 L it is not producing as much oxygen as when she was here and placed on oxygen 4 L she was 100%.  They feel very comfortable taking her home.  They will follow-up with the primary care doctor.

## 2024-02-28 PROBLEM — I77.9 CAROTID ARTERIAL DISEASE (CMS-HCC): Status: RESOLVED | Noted: 2023-08-28 | Resolved: 2024-02-28

## 2024-02-28 PROBLEM — I50.33: Status: ACTIVE | Noted: 2023-08-28

## 2024-02-28 PROBLEM — Z86.39 H/O: HYPOTHYROIDISM: Status: RESOLVED | Noted: 2023-12-01 | Resolved: 2024-02-28

## 2024-02-28 PROBLEM — M79.673 FOOT PAIN: Status: RESOLVED | Noted: 2023-12-01 | Resolved: 2024-02-28

## 2024-02-28 PROBLEM — I65.23 BILATERAL CAROTID ARTERY STENOSIS: Status: RESOLVED | Noted: 2023-08-28 | Resolved: 2024-02-28

## 2024-03-06 ENCOUNTER — OFFICE VISIT (OUTPATIENT)
Dept: CARDIOLOGY | Facility: CLINIC | Age: 89
End: 2024-03-06
Payer: COMMERCIAL

## 2024-03-06 VITALS
OXYGEN SATURATION: 94 % | BODY MASS INDEX: 22.19 KG/M2 | DIASTOLIC BLOOD PRESSURE: 68 MMHG | HEIGHT: 60 IN | WEIGHT: 113 LBS | SYSTOLIC BLOOD PRESSURE: 123 MMHG | HEART RATE: 89 BPM

## 2024-03-06 DIAGNOSIS — I10 HTN (HYPERTENSION), BENIGN: ICD-10-CM

## 2024-03-06 DIAGNOSIS — E11.3299: ICD-10-CM

## 2024-03-06 DIAGNOSIS — E78.00 PURE HYPERCHOLESTEROLEMIA: ICD-10-CM

## 2024-03-06 DIAGNOSIS — I50.32 CHRONIC DIASTOLIC CONGESTIVE HEART FAILURE (MULTI): ICD-10-CM

## 2024-03-06 DIAGNOSIS — E11.42 DM TYPE 2 WITH DIABETIC PERIPHERAL NEUROPATHY (MULTI): Chronic | ICD-10-CM

## 2024-03-06 DIAGNOSIS — R05.3 CHRONIC COUGH: ICD-10-CM

## 2024-03-06 DIAGNOSIS — Z98.890 S/P BILATERAL CAROTID ENDARTERECTOMY: ICD-10-CM

## 2024-03-06 DIAGNOSIS — E03.9 HYPOTHYROIDISM, UNSPECIFIED TYPE: ICD-10-CM

## 2024-03-06 DIAGNOSIS — I25.10 CORONARY ARTERY DISEASE INVOLVING NATIVE CORONARY ARTERY OF NATIVE HEART WITHOUT ANGINA PECTORIS: ICD-10-CM

## 2024-03-06 DIAGNOSIS — I34.0 NONRHEUMATIC MITRAL VALVE REGURGITATION: ICD-10-CM

## 2024-03-06 DIAGNOSIS — I35.1 NONRHEUMATIC AORTIC VALVE INSUFFICIENCY: ICD-10-CM

## 2024-03-06 DIAGNOSIS — Z95.1 HX OF CABG: ICD-10-CM

## 2024-03-06 PROCEDURE — 3074F SYST BP LT 130 MM HG: CPT | Performed by: INTERNAL MEDICINE

## 2024-03-06 PROCEDURE — 1036F TOBACCO NON-USER: CPT | Performed by: INTERNAL MEDICINE

## 2024-03-06 PROCEDURE — 1126F AMNT PAIN NOTED NONE PRSNT: CPT | Performed by: INTERNAL MEDICINE

## 2024-03-06 PROCEDURE — 99214 OFFICE O/P EST MOD 30 MIN: CPT | Performed by: INTERNAL MEDICINE

## 2024-03-06 PROCEDURE — 1160F RVW MEDS BY RX/DR IN RCRD: CPT | Performed by: INTERNAL MEDICINE

## 2024-03-06 PROCEDURE — 1159F MED LIST DOCD IN RCRD: CPT | Performed by: INTERNAL MEDICINE

## 2024-03-06 PROCEDURE — 3078F DIAST BP <80 MM HG: CPT | Performed by: INTERNAL MEDICINE

## 2024-03-06 RX ORDER — SPIRONOLACTONE 25 MG/1
25 TABLET ORAL DAILY
COMMUNITY
Start: 2024-02-08

## 2024-03-06 NOTE — PROGRESS NOTES
Subjective  Soledad Melendrez  is a 89 y.o. year old female who presents for mitral regurgitations, HFpEF.  Seen [facility] ER 1/3/24 for dyspnea and found her oxygen machine was not working right.  Uses very little oxygen, no ankle edema    Blood pressure 123/68, pulse 89, height 1.524 m (5'), weight 51.3 kg (113 lb), SpO2 94 %.   Influenza virus vaccines, Lisinopril, Morphine, Nsaids (non-steroidal anti-inflammatory drug), Pneumococcal vaccine, and Lovastatin  Past Medical History:   Diagnosis Date    Old myocardial infarction     History of myocardial infarction    Onycholysis 12/01/2023    Personal history of other diseases of the circulatory system     History of coronary artery disease    Personal history of other diseases of the circulatory system 06/30/2020    History of hypertension    Personal history of other diseases of the circulatory system     History of hypertension    Personal history of other diseases of the musculoskeletal system and connective tissue     History of arthritis    Personal history of other diseases of the respiratory system     History of lung disease    Personal history of other endocrine, nutritional and metabolic disease     History of thyroid disorder    Personal history of other endocrine, nutritional and metabolic disease 09/01/2016    History of type 2 diabetes mellitus    Personal history of other endocrine, nutritional and metabolic disease     History of hyperlipidemia    Personal history of other endocrine, nutritional and metabolic disease     History of hypothyroidism    Unspecified cataract     Cataracts, bilateral    Unspecified hearing loss, right ear 01/12/2019    Hearing loss on right     Past Surgical History:   Procedure Laterality Date    APPENDECTOMY  09/01/2016    Appendectomy    CORONARY ARTERY BYPASS GRAFT  09/01/2016    CABG    OTHER SURGICAL HISTORY  09/01/2016    Endarterectomy Carotid Artery    TONSILLECTOMY  09/01/2016    Tonsillectomy With  Adenoidectomy     Family History   Problem Relation Name Age of Onset    Stroke Father      Hypertension Father      Diabetes Sister      Diabetes Brother      Hypertension Brother       @SOC    Current Outpatient Medications   Medication Sig Dispense Refill    spironolactone (Aldactone) 25 mg tablet Take 1 tablet (25 mg) by mouth once daily.      acetaminophen (Tylenol Arthritis Pain) 650 mg ER tablet Take 1 tablet (650 mg) by mouth.      albuterol 2.5 mg /3 mL (0.083 %) nebulizer solution Take 3 mL (2.5 mg) by nebulization every 6 hours.      albuterol 90 mcg/actuation inhaler Inhale 2 puffs every 6 hours if needed for wheezing.      amLODIPine (Norvasc) 5 mg tablet Take 1 tablet (5 mg) by mouth once daily in the morning.      atorvastatin (Lipitor) 20 mg tablet Take 1 tablet (20 mg) by mouth once daily in the morning.      cetirizine (ZyrTEC) 10 mg tablet 1 tablet DAILY (route: oral)      erythromycin (Romycin) 5 mg/gram (0.5 %) ophthalmic ointment Apply to left eye 4 times a day.  APPLY 1/2 THIN LAYER      fluticasone (Flonase) 50 mcg/actuation nasal spray Administer 1 spray into each nostril once daily in the morning. Shake gently. Before first use, prime pump. After use, clean tip and replace cap      furosemide (Lasix) 20 mg tablet Take 2 tablets (40 mg) by mouth once daily in the morning AND 1 tablet (20 mg) once daily in the evening. 180 tablet 3    levothyroxine (Synthroid, Levoxyl) 75 mcg tablet Take 1 tablet (75 mcg) by mouth once daily in the morning.      Lexapro 5 mg tablet 1 tablet (5 mg).      metoprolol tartrate (Lopressor) 25 mg tablet Take 1 tablet (25 mg) by mouth twice a day. Take in the morning and before bedtime      nitroglycerin (Nitrostat) 0.4 mg SL tablet Place 1 tablet (0.4 mg) under the tongue every 5 minutes if needed for chest pain.      omega 3-dha-epa-fish oil (Fish OiL) 1,200 (144-216) mg capsule Take 1 capsule (1,200 mg) by mouth.      omeprazole (PriLOSEC) 20 mg DR capsule Take 1  capsule (20 mg) by mouth once daily in the morning.      polyethylene glycol (Glycolax, Miralax) 17 gram/dose powder DISSOLVE 17 GRAMS IN WATER AND DRINK ONCE DAILY      triamcinolone (Kenalog) 0.1 % cream Apply topically.      warfarin (Coumadin) 2.5 mg tablet Take 1 tablet (2.5 mg) by mouth.       No current facility-administered medications for this visit.        ROS  Review of Systems   All other systems reviewed and are negative.      Physical Exam  Physical Exam  Constitutional:       Appearance: Normal appearance.   HENT:      Head: Normocephalic and atraumatic.   Eyes:      Extraocular Movements: Extraocular movements intact.      Pupils: Pupils are equal, round, and reactive to light.   Cardiovascular:      Rate and Rhythm: Rhythm irregularly irregular.      Heart sounds: S1 normal and S2 normal.   Musculoskeletal:      Right lower leg: No edema.      Left lower leg: No edema.   Skin:     General: Skin is warm and dry.   Neurological:      General: No focal deficit present.      Mental Status: She is alert and oriented to person, place, and time.   Psychiatric:         Mood and Affect: Mood normal.         Behavior: Behavior normal.          EKG  No results found for this or any previous visit (from the past 4464 hour(s)).    Problem List Items Addressed This Visit       CAD (coronary artery disease)    Hyperlipidemia    Mitral valve regurgitation     Refused for Isi Clip by Dr. Richey at [facility] [place]         Relevant Orders    Follow Up In Cardiology    Hypothyroidism    S/p bilateral carotid endarterectomy    HTN (hypertension), benign    Chronic cough    CHF (congestive heart failure) (CMS/HCC)    Relevant Orders    Follow Up In Cardiology    Hx of CABG     [facility] 1988 by Dr. 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      Calin Hernandez MD

## 2024-05-17 PROBLEM — I83.90 VARICOSE VEIN OF LEG: Status: RESOLVED | Noted: 2023-08-28 | Resolved: 2024-05-17

## 2024-06-10 ENCOUNTER — OFFICE VISIT (OUTPATIENT)
Dept: CARDIOLOGY | Facility: CLINIC | Age: 89
End: 2024-06-10
Payer: COMMERCIAL

## 2024-06-10 VITALS
HEART RATE: 78 BPM | BODY MASS INDEX: 21.14 KG/M2 | WEIGHT: 112 LBS | SYSTOLIC BLOOD PRESSURE: 150 MMHG | HEIGHT: 61 IN | DIASTOLIC BLOOD PRESSURE: 90 MMHG

## 2024-06-10 DIAGNOSIS — I48.11 LONGSTANDING PERSISTENT ATRIAL FIBRILLATION (MULTI): ICD-10-CM

## 2024-06-10 DIAGNOSIS — I34.0 NONRHEUMATIC MITRAL VALVE REGURGITATION: Primary | ICD-10-CM

## 2024-06-10 DIAGNOSIS — I25.10 CORONARY ARTERY DISEASE INVOLVING NATIVE CORONARY ARTERY OF NATIVE HEART WITHOUT ANGINA PECTORIS: ICD-10-CM

## 2024-06-10 DIAGNOSIS — I10 HTN (HYPERTENSION), BENIGN: ICD-10-CM

## 2024-06-10 DIAGNOSIS — I35.1 NONRHEUMATIC AORTIC VALVE INSUFFICIENCY: ICD-10-CM

## 2024-06-10 DIAGNOSIS — Z98.890 S/P BILATERAL CAROTID ENDARTERECTOMY: ICD-10-CM

## 2024-06-10 DIAGNOSIS — I83.93 VARICOSE VEINS OF BOTH LOWER EXTREMITIES, UNSPECIFIED WHETHER COMPLICATED: ICD-10-CM

## 2024-06-10 DIAGNOSIS — Z95.1 HX OF CABG: ICD-10-CM

## 2024-06-10 DIAGNOSIS — I50.32 CHRONIC DIASTOLIC CONGESTIVE HEART FAILURE (MULTI): ICD-10-CM

## 2024-06-10 PROCEDURE — 93000 ELECTROCARDIOGRAM COMPLETE: CPT | Performed by: INTERNAL MEDICINE

## 2024-06-10 PROCEDURE — 3077F SYST BP >= 140 MM HG: CPT | Performed by: INTERNAL MEDICINE

## 2024-06-10 PROCEDURE — 1036F TOBACCO NON-USER: CPT | Performed by: INTERNAL MEDICINE

## 2024-06-10 PROCEDURE — 99214 OFFICE O/P EST MOD 30 MIN: CPT | Performed by: INTERNAL MEDICINE

## 2024-06-10 PROCEDURE — 1159F MED LIST DOCD IN RCRD: CPT | Performed by: INTERNAL MEDICINE

## 2024-06-10 PROCEDURE — 3080F DIAST BP >= 90 MM HG: CPT | Performed by: INTERNAL MEDICINE

## 2024-06-10 PROCEDURE — 1160F RVW MEDS BY RX/DR IN RCRD: CPT | Performed by: INTERNAL MEDICINE

## 2024-06-10 RX ORDER — FUROSEMIDE 20 MG/1
TABLET ORAL
Qty: 360 TABLET | Refills: 3 | Status: SHIPPED | OUTPATIENT
Start: 2024-06-10

## 2024-06-10 NOTE — PROGRESS NOTES
"  Subjective  Soledad Melendrez  is a 89 y.o. year old female who presents for mitral regurgitation doing well.  Has dementia.  She has rare chest pains relieved by .  Breathing is okay, no edema  Blood pressure 150/90, pulse 78, height 1.549 m (5' 1\"), weight 50.8 kg (112 lb).   Influenza virus vaccines, Lisinopril, Morphine, Nsaids (non-steroidal anti-inflammatory drug), Pneumococcal vaccine, and Lovastatin  Past Medical History:   Diagnosis Date    Old myocardial infarction     History of myocardial infarction    Onycholysis 12/01/2023    Personal history of other diseases of the circulatory system     History of coronary artery disease    Personal history of other diseases of the circulatory system 06/30/2020    History of hypertension    Personal history of other diseases of the circulatory system     History of hypertension    Personal history of other diseases of the musculoskeletal system and connective tissue     History of arthritis    Personal history of other diseases of the respiratory system     History of lung disease    Personal history of other endocrine, nutritional and metabolic disease     History of thyroid disorder    Personal history of other endocrine, nutritional and metabolic disease 09/01/2016    History of type 2 diabetes mellitus    Personal history of other endocrine, nutritional and metabolic disease     History of hyperlipidemia    Personal history of other endocrine, nutritional and metabolic disease     History of hypothyroidism    Unspecified cataract     Cataracts, bilateral    Unspecified hearing loss, right ear 01/12/2019    Hearing loss on right     Past Surgical History:   Procedure Laterality Date    APPENDECTOMY  09/01/2016    Appendectomy    CORONARY ARTERY BYPASS GRAFT  09/01/2016    CABG    OTHER SURGICAL HISTORY  09/01/2016    Endarterectomy Carotid Artery    TONSILLECTOMY  09/01/2016    Tonsillectomy With Adenoidectomy     Family History   Problem Relation Name " Age of Onset    Stroke Father      Hypertension Father      Diabetes Sister      Diabetes Brother      Hypertension Brother       @SOC    Current Outpatient Medications   Medication Sig Dispense Refill    acetaminophen (Tylenol Arthritis Pain) 650 mg ER tablet Take 1 tablet (650 mg) by mouth.      albuterol 2.5 mg /3 mL (0.083 %) nebulizer solution Take 3 mL (2.5 mg) by nebulization every 6 hours.      albuterol 90 mcg/actuation inhaler Inhale 2 puffs every 6 hours if needed for wheezing.      amLODIPine (Norvasc) 5 mg tablet Take 1 tablet (5 mg) by mouth once daily in the morning.      atorvastatin (Lipitor) 20 mg tablet Take 1 tablet (20 mg) by mouth once daily in the morning.      cetirizine (ZyrTEC) 10 mg tablet 1 tablet DAILY (route: oral)      erythromycin (Romycin) 5 mg/gram (0.5 %) ophthalmic ointment Apply to left eye 4 times a day.  APPLY 1/2 THIN LAYER      fluticasone (Flonase) 50 mcg/actuation nasal spray Administer 1 spray into each nostril once daily in the morning. Shake gently. Before first use, prime pump. After use, clean tip and replace cap      furosemide (Lasix) 20 mg tablet Take 2 tablets (40 mg) by mouth once daily in the morning AND 2 tablets (40 mg) 2 times daily (morning and late afternoon). 360 tablet 3    levothyroxine (Synthroid, Levoxyl) 75 mcg tablet Take 1 tablet (75 mcg) by mouth once daily in the morning.      Lexapro 5 mg tablet 1 tablet (5 mg).      metoprolol tartrate (Lopressor) 25 mg tablet Take 1 tablet (25 mg) by mouth twice a day. Take in the morning and before bedtime      nitroglycerin (Nitrostat) 0.4 mg SL tablet Place 1 tablet (0.4 mg) under the tongue every 5 minutes if needed for chest pain.      omega 3-dha-epa-fish oil (Fish OiL) 1,200 (144-216) mg capsule Take 1 capsule (1,200 mg) by mouth.      omeprazole (PriLOSEC) 20 mg DR capsule Take 1 capsule (20 mg) by mouth once daily in the morning.      polyethylene glycol (Glycolax, Miralax) 17 gram/dose powder DISSOLVE  17 GRAMS IN WATER AND DRINK ONCE DAILY      spironolactone (Aldactone) 25 mg tablet Take 1 tablet (25 mg) by mouth once daily.      triamcinolone (Kenalog) 0.1 % cream Apply topically.      warfarin (Coumadin) 2.5 mg tablet Take 1 tablet (2.5 mg) by mouth.       No current facility-administered medications for this visit.        ROS  Review of Systems   All other systems reviewed and are negative.      Physical Exam  Physical Exam  Constitutional:       Appearance: Normal appearance.   HENT:      Head: Normocephalic and atraumatic.   Cardiovascular:      Rate and Rhythm: Rhythm irregularly irregular.      Comments: IV/VI HSM from apex to left axilla  Pulmonary:      Effort: Pulmonary effort is normal.      Breath sounds: Normal breath sounds.   Abdominal:      General: Abdomen is flat.   Musculoskeletal:      Right lower leg: No edema.      Left lower leg: No edema.   Skin:     General: Skin is warm and dry.   Neurological:      General: No focal deficit present.      Mental Status: She is alert and oriented to person, place, and time.   Psychiatric:         Mood and Affect: Mood normal.         Behavior: Behavior normal.          EKG  No results found for this or any previous visit (from the past 4464 hour(s)).    Problem List Items Addressed This Visit       Atrial fibrillation (Multi)    CAD (coronary artery disease)    Mitral valve regurgitation     Isi Clip refuse by Dr. Richey at [facility] [address]         Relevant Orders    Follow Up In Cardiology    S/p bilateral carotid endarterectomy    HTN (hypertension), benign    CHF (congestive heart failure) (Multi)    Relevant Medications    furosemide (Lasix) 20 mg tablet    Other Relevant Orders    Follow Up In Cardiology    Hx of CABG     [facility] 1988 Dr. Solomon         Relevant Orders    Follow Up In Cardiology    Varicose veins of legs - Primary    Aortic valve regurgitation         Same meds with recheck EKG 3 months and echocardiogram      Calin Hernandez MD

## 2024-06-13 PROBLEM — I48.91 ATRIAL FIBRILLATION, UNSPECIFIED TYPE (MULTI): Status: ACTIVE | Noted: 2024-06-13

## 2024-08-19 PROBLEM — I48.91 ATRIAL FIBRILLATION, UNSPECIFIED TYPE (MULTI): Status: RESOLVED | Noted: 2024-06-13 | Resolved: 2024-08-19

## 2024-09-10 ENCOUNTER — APPOINTMENT (OUTPATIENT)
Dept: CARDIOLOGY | Facility: CLINIC | Age: 89
End: 2024-09-10
Payer: COMMERCIAL

## 2024-09-27 ENCOUNTER — HOSPITAL ENCOUNTER (OUTPATIENT)
Dept: CARDIOLOGY | Facility: CLINIC | Age: 89
Discharge: HOME | End: 2024-09-27
Payer: COMMERCIAL

## 2024-09-27 VITALS
BODY MASS INDEX: 21.14 KG/M2 | WEIGHT: 112 LBS | HEIGHT: 61 IN | SYSTOLIC BLOOD PRESSURE: 150 MMHG | DIASTOLIC BLOOD PRESSURE: 90 MMHG

## 2024-09-27 DIAGNOSIS — I37.1 NONRHEUMATIC PULMONARY VALVE INSUFFICIENCY: ICD-10-CM

## 2024-09-27 DIAGNOSIS — I35.1 NONRHEUMATIC AORTIC VALVE INSUFFICIENCY: ICD-10-CM

## 2024-09-27 DIAGNOSIS — I36.1 NONRHEUMATIC TRICUSPID (VALVE) INSUFFICIENCY: ICD-10-CM

## 2024-09-27 DIAGNOSIS — I34.0 NONRHEUMATIC MITRAL VALVE REGURGITATION: ICD-10-CM

## 2024-09-27 DIAGNOSIS — I05.1 RHEUMATIC MITRAL INSUFFICIENCY: ICD-10-CM

## 2024-09-27 LAB
AORTIC VALVE MEAN GRADIENT: 4.3 MMHG
AORTIC VALVE PEAK VELOCITY: 1.42 M/S
AV PEAK GRADIENT: 8.1 MMHG
AVA (PEAK VEL): 0.9 CM2
AVA (VTI): 0.81 CM2
EJECTION FRACTION APICAL 4 CHAMBER: 48.3
EJECTION FRACTION: 53 %
LEFT ATRIUM VOLUME AREA LENGTH INDEX BSA: 55.7 ML/M2
LEFT VENTRICLE INTERNAL DIMENSION DIASTOLE: 4.31 CM (ref 3.5–6)
LEFT VENTRICULAR OUTFLOW TRACT DIAMETER: 1.71 CM
RIGHT VENTRICLE FREE WALL PEAK S': 0.5 CM/S
RIGHT VENTRICLE PEAK SYSTOLIC PRESSURE: 89.8 MMHG
TRICUSPID ANNULAR PLANE SYSTOLIC EXCURSION: 1.1 CM

## 2024-09-27 PROCEDURE — 93306 TTE W/DOPPLER COMPLETE: CPT | Performed by: INTERNAL MEDICINE

## 2024-09-27 PROCEDURE — 93306 TTE W/DOPPLER COMPLETE: CPT

## 2024-09-30 ENCOUNTER — APPOINTMENT (OUTPATIENT)
Dept: CARDIOLOGY | Facility: CLINIC | Age: 89
End: 2024-09-30
Payer: COMMERCIAL

## 2024-09-30 VITALS — HEART RATE: 80 BPM | SYSTOLIC BLOOD PRESSURE: 144 MMHG | DIASTOLIC BLOOD PRESSURE: 80 MMHG | OXYGEN SATURATION: 96 %

## 2024-09-30 DIAGNOSIS — Z95.1 HX OF CABG: ICD-10-CM

## 2024-09-30 DIAGNOSIS — I10 HTN (HYPERTENSION), BENIGN: ICD-10-CM

## 2024-09-30 DIAGNOSIS — E11.42 DM TYPE 2 WITH DIABETIC PERIPHERAL NEUROPATHY: Chronic | ICD-10-CM

## 2024-09-30 DIAGNOSIS — I34.0 NONRHEUMATIC MITRAL VALVE REGURGITATION: Primary | ICD-10-CM

## 2024-09-30 DIAGNOSIS — E78.00 PURE HYPERCHOLESTEROLEMIA: ICD-10-CM

## 2024-09-30 DIAGNOSIS — Z98.890 S/P BILATERAL CAROTID ENDARTERECTOMY: ICD-10-CM

## 2024-09-30 DIAGNOSIS — I87.2 VENOUS INSUFFICIENCY (CHRONIC) (PERIPHERAL): ICD-10-CM

## 2024-09-30 DIAGNOSIS — I25.10 CORONARY ARTERY DISEASE INVOLVING NATIVE CORONARY ARTERY OF NATIVE HEART WITHOUT ANGINA PECTORIS: ICD-10-CM

## 2024-09-30 DIAGNOSIS — I50.32 CHRONIC DIASTOLIC CONGESTIVE HEART FAILURE: ICD-10-CM

## 2024-09-30 DIAGNOSIS — I35.1 NONRHEUMATIC AORTIC VALVE INSUFFICIENCY: ICD-10-CM

## 2024-09-30 PROCEDURE — 93010 ELECTROCARDIOGRAM REPORT: CPT | Performed by: INTERNAL MEDICINE

## 2024-09-30 PROCEDURE — 1160F RVW MEDS BY RX/DR IN RCRD: CPT | Performed by: INTERNAL MEDICINE

## 2024-09-30 PROCEDURE — 3079F DIAST BP 80-89 MM HG: CPT | Performed by: INTERNAL MEDICINE

## 2024-09-30 PROCEDURE — 99214 OFFICE O/P EST MOD 30 MIN: CPT | Performed by: INTERNAL MEDICINE

## 2024-09-30 PROCEDURE — 3077F SYST BP >= 140 MM HG: CPT | Performed by: INTERNAL MEDICINE

## 2024-09-30 PROCEDURE — 93005 ELECTROCARDIOGRAM TRACING: CPT | Performed by: INTERNAL MEDICINE

## 2024-09-30 PROCEDURE — 1036F TOBACCO NON-USER: CPT | Performed by: INTERNAL MEDICINE

## 2024-09-30 PROCEDURE — 1159F MED LIST DOCD IN RCRD: CPT | Performed by: INTERNAL MEDICINE

## 2024-09-30 NOTE — ASSESSMENT & PLAN NOTE
9/27/24 echocardiogram severe MR,  LVEf = 50-55%,  TR with severely elevated RVSP, ANTONIO and RV dilation, aortic sclerosis.  MitraClip refused by Dr. Richey at [facility]

## 2024-09-30 NOTE — PROGRESS NOTES
Subjective  Soledad Melendrez  is a 89 y.o. year old female who presents for atrial fibrillation, mitral regurgitation, amlodipine stopped.  Feels well overall, Still cooking    Blood pressure 144/80, pulse 80, SpO2 96%.   Influenza virus vaccines, Lisinopril, Morphine, Nsaids (non-steroidal anti-inflammatory drug), Pneumococcal vaccine, and Lovastatin  Past Medical History:   Diagnosis Date    Old myocardial infarction     History of myocardial infarction    Onycholysis 12/01/2023    Personal history of other diseases of the circulatory system     History of coronary artery disease    Personal history of other diseases of the circulatory system 06/30/2020    History of hypertension    Personal history of other diseases of the circulatory system     History of hypertension    Personal history of other diseases of the musculoskeletal system and connective tissue     History of arthritis    Personal history of other diseases of the respiratory system     History of lung disease    Personal history of other endocrine, nutritional and metabolic disease     History of thyroid disorder    Personal history of other endocrine, nutritional and metabolic disease 09/01/2016    History of type 2 diabetes mellitus    Personal history of other endocrine, nutritional and metabolic disease     History of hyperlipidemia    Personal history of other endocrine, nutritional and metabolic disease     History of hypothyroidism    Unspecified cataract     Cataracts, bilateral    Unspecified hearing loss, right ear 01/12/2019    Hearing loss on right     Past Surgical History:   Procedure Laterality Date    APPENDECTOMY  09/01/2016    Appendectomy    CORONARY ARTERY BYPASS GRAFT  09/01/2016    CABG    OTHER SURGICAL HISTORY  09/01/2016    Endarterectomy Carotid Artery    TONSILLECTOMY  09/01/2016    Tonsillectomy With Adenoidectomy     Family History   Problem Relation Name Age of Onset    Stroke Father      Hypertension Father       Diabetes Sister      Diabetes Brother      Hypertension Brother       @SOC    Current Outpatient Medications   Medication Sig Dispense Refill    acetaminophen (Tylenol Arthritis Pain) 650 mg ER tablet Take 1 tablet (650 mg) by mouth.      albuterol 2.5 mg /3 mL (0.083 %) nebulizer solution Take 3 mL (2.5 mg) by nebulization every 6 hours.      albuterol 90 mcg/actuation inhaler Inhale 2 puffs every 6 hours if needed for wheezing.      atorvastatin (Lipitor) 20 mg tablet Take 1 tablet (20 mg) by mouth once daily in the morning.      cetirizine (ZyrTEC) 10 mg tablet 1 tablet DAILY (route: oral)      erythromycin (Romycin) 5 mg/gram (0.5 %) ophthalmic ointment Apply to left eye 4 times a day.  APPLY 1/2 THIN LAYER      fluticasone (Flonase) 50 mcg/actuation nasal spray Administer 1 spray into each nostril once daily in the morning. Shake gently. Before first use, prime pump. After use, clean tip and replace cap      furosemide (Lasix) 20 mg tablet Take 2 tablets (40 mg) by mouth once daily in the morning AND 2 tablets (40 mg) 2 times daily (morning and late afternoon). 360 tablet 3    levothyroxine (Synthroid, Levoxyl) 75 mcg tablet Take 1 tablet (75 mcg) by mouth once daily in the morning.      Lexapro 5 mg tablet 1 tablet (5 mg).      metoprolol tartrate (Lopressor) 25 mg tablet Take 1 tablet (25 mg) by mouth twice a day. Take in the morning and before bedtime      nitroglycerin (Nitrostat) 0.4 mg SL tablet Place 1 tablet (0.4 mg) under the tongue every 5 minutes if needed for chest pain.      omega 3-dha-epa-fish oil (Fish OiL) 1,200 (144-216) mg capsule Take 1 capsule (1,200 mg) by mouth.      omeprazole (PriLOSEC) 20 mg DR capsule Take 1 capsule (20 mg) by mouth once daily in the morning.      polyethylene glycol (Glycolax, Miralax) 17 gram/dose powder DISSOLVE 17 GRAMS IN WATER AND DRINK ONCE DAILY      spironolactone (Aldactone) 25 mg tablet Take 1 tablet (25 mg) by mouth once daily.      triamcinolone  (Kenalog) 0.1 % cream Apply topically.      warfarin (Coumadin) 2.5 mg tablet Take 1 tablet (2.5 mg) by mouth.       No current facility-administered medications for this visit.        ROS  Review of Systems   All other systems reviewed and are negative.      Physical Exam  Physical Exam  Constitutional:       Appearance: Normal appearance.   HENT:      Head: Normocephalic and atraumatic.   Cardiovascular:      Rate and Rhythm: Normal rate and regular rhythm.      Comments: IV/VI HSM from apext to left axilla  Pulmonary:      Effort: Pulmonary effort is normal.      Breath sounds: Normal breath sounds.   Abdominal:      General: Abdomen is flat.   Skin:     General: Skin is warm and dry.   Neurological:      General: No focal deficit present.      Mental Status: She is alert and oriented to person, place, and time.   Psychiatric:         Mood and Affect: Mood normal.         Behavior: Behavior normal.          EKG  Encounter Date: 09/30/24   ECG 12 Lead    Narrative    Probable NSR , IRBBB, vertical axis, ST-T abn.       Problem List Items Addressed This Visit       CAD (coronary artery disease)    Hyperlipidemia    Mitral valve regurgitation - Primary     9/27/24 echocardiogram severe MR,  LVEf = 50-55%,  TR with severely elevated RVSP, ANTONIO and RV dilation, aortic sclerosis.  MitraClip refused by Dr. Richey at [facility]         Venous insufficiency (chronic) (peripheral)    S/p bilateral carotid endarterectomy    HTN (hypertension), benign    Hx of CABG     [facility] 1988 Dr. Solomon         Aortic valve regurgitation    DM type 2 with diabetic peripheral neuropathy (Multi) (Chronic)     Other Visit Diagnoses       Chronic diastolic congestive heart failure (Multi)        Relevant Orders    ECG 12 Lead (Completed)              Return 3 months with EKG      Calin Hernandez MD

## 2025-01-06 ENCOUNTER — APPOINTMENT (OUTPATIENT)
Dept: CARDIOLOGY | Facility: CLINIC | Age: OVER 89
End: 2025-01-06
Payer: COMMERCIAL

## 2025-01-28 ENCOUNTER — OFFICE VISIT (OUTPATIENT)
Dept: CARDIOLOGY | Facility: CLINIC | Age: OVER 89
End: 2025-01-28
Payer: COMMERCIAL

## 2025-01-28 VITALS — SYSTOLIC BLOOD PRESSURE: 130 MMHG | OXYGEN SATURATION: 95 % | DIASTOLIC BLOOD PRESSURE: 80 MMHG | HEART RATE: 69 BPM

## 2025-01-28 DIAGNOSIS — I25.10 CORONARY ARTERY DISEASE INVOLVING NATIVE CORONARY ARTERY OF NATIVE HEART WITHOUT ANGINA PECTORIS: ICD-10-CM

## 2025-01-28 DIAGNOSIS — Z95.1 HX OF CABG: ICD-10-CM

## 2025-01-28 DIAGNOSIS — I34.0 NONRHEUMATIC MITRAL VALVE REGURGITATION: Primary | ICD-10-CM

## 2025-01-28 DIAGNOSIS — I35.1 NONRHEUMATIC AORTIC VALVE INSUFFICIENCY: ICD-10-CM

## 2025-01-28 DIAGNOSIS — Z98.890 S/P BILATERAL CAROTID ENDARTERECTOMY: ICD-10-CM

## 2025-01-28 DIAGNOSIS — E78.00 PURE HYPERCHOLESTEROLEMIA: ICD-10-CM

## 2025-01-28 DIAGNOSIS — E03.9 HYPOTHYROIDISM, UNSPECIFIED TYPE: ICD-10-CM

## 2025-01-28 DIAGNOSIS — I10 HTN (HYPERTENSION), BENIGN: ICD-10-CM

## 2025-01-28 DIAGNOSIS — I50.32 CHRONIC DIASTOLIC CONGESTIVE HEART FAILURE: ICD-10-CM

## 2025-01-28 DIAGNOSIS — E11.42 DM TYPE 2 WITH DIABETIC PERIPHERAL NEUROPATHY: Chronic | ICD-10-CM

## 2025-01-28 LAB
ATRIAL RATE: 70 BPM
PR INTERVAL: 376 MS
Q ONSET: 224 MS
QRS COUNT: 12 BEATS
QRS DURATION: 110 MS
QT INTERVAL: 416 MS
QTC CALCULATION(BAZETT): 449 MS
QTC FREDERICIA: 438 MS
R AXIS: 104 DEGREES
T AXIS: -38 DEGREES
T OFFSET: 432 MS
VENTRICULAR RATE: 70 BPM

## 2025-01-28 PROCEDURE — 93010 ELECTROCARDIOGRAM REPORT: CPT | Performed by: INTERNAL MEDICINE

## 2025-01-28 PROCEDURE — 99214 OFFICE O/P EST MOD 30 MIN: CPT | Performed by: INTERNAL MEDICINE

## 2025-01-28 PROCEDURE — 1159F MED LIST DOCD IN RCRD: CPT | Performed by: INTERNAL MEDICINE

## 2025-01-28 PROCEDURE — 93005 ELECTROCARDIOGRAM TRACING: CPT | Performed by: INTERNAL MEDICINE

## 2025-01-28 PROCEDURE — 3074F SYST BP LT 130 MM HG: CPT | Performed by: INTERNAL MEDICINE

## 2025-01-28 PROCEDURE — 99214 OFFICE O/P EST MOD 30 MIN: CPT | Mod: 25 | Performed by: INTERNAL MEDICINE

## 2025-01-28 PROCEDURE — 1160F RVW MEDS BY RX/DR IN RCRD: CPT | Performed by: INTERNAL MEDICINE

## 2025-01-28 PROCEDURE — 3078F DIAST BP <80 MM HG: CPT | Performed by: INTERNAL MEDICINE

## 2025-01-28 PROCEDURE — 1036F TOBACCO NON-USER: CPT | Performed by: INTERNAL MEDICINE

## 2025-01-28 RX ORDER — LEVOTHYROXINE SODIUM 50 UG/1
1 TABLET ORAL
COMMUNITY
Start: 2024-11-13

## 2025-01-28 RX ORDER — ATORVASTATIN CALCIUM 40 MG/1
1 TABLET, FILM COATED ORAL
COMMUNITY
Start: 2025-01-14

## 2025-01-28 RX ORDER — FUROSEMIDE 20 MG/1
TABLET ORAL
Qty: 360 TABLET | Refills: 3 | Status: SHIPPED | OUTPATIENT
Start: 2025-01-28

## 2025-01-28 NOTE — PROGRESS NOTES
Subjective  Soledad Melendrez  is a 89 y.o. year old female who presents for mitral regurgitation.  Coughing at night post nasal drip.  She had a little fluid overload 1 months ago resolved with exrta Lasix.  No chest pain, rare palptiation    Blood pressure 130/80, pulse 69, SpO2 95%.   Influenza virus vaccines, Lisinopril, Morphine, Nsaids (non-steroidal anti-inflammatory drug), Pneumococcal vaccine, and Lovastatin  Past Medical History:   Diagnosis Date    Old myocardial infarction     History of myocardial infarction    Onycholysis 12/01/2023    Personal history of other diseases of the circulatory system     History of coronary artery disease    Personal history of other diseases of the circulatory system 06/30/2020    History of hypertension    Personal history of other diseases of the circulatory system     History of hypertension    Personal history of other diseases of the musculoskeletal system and connective tissue     History of arthritis    Personal history of other diseases of the respiratory system     History of lung disease    Personal history of other endocrine, nutritional and metabolic disease     History of thyroid disorder    Personal history of other endocrine, nutritional and metabolic disease 09/01/2016    History of type 2 diabetes mellitus    Personal history of other endocrine, nutritional and metabolic disease     History of hyperlipidemia    Personal history of other endocrine, nutritional and metabolic disease     History of hypothyroidism    Unspecified cataract     Cataracts, bilateral    Unspecified hearing loss, right ear 01/12/2019    Hearing loss on right     Past Surgical History:   Procedure Laterality Date    APPENDECTOMY  09/01/2016    Appendectomy    CORONARY ARTERY BYPASS GRAFT  09/01/2016    CABG    OTHER SURGICAL HISTORY  09/01/2016    Endarterectomy Carotid Artery    TONSILLECTOMY  09/01/2016    Tonsillectomy With Adenoidectomy     Family History   Problem  Relation Name Age of Onset    Stroke Father      Hypertension Father      Diabetes Sister      Diabetes Brother      Hypertension Brother       @SOC    Current Outpatient Medications   Medication Sig Dispense Refill    atorvastatin (Lipitor) 40 mg tablet Take 1 tablet (40 mg) by mouth early in the morning..      levothyroxine (Synthroid, Levoxyl) 50 mcg tablet Take 1 tablet (50 mcg) by mouth early in the morning..      acetaminophen (Tylenol Arthritis Pain) 650 mg ER tablet Take 1 tablet (650 mg) by mouth.      albuterol 2.5 mg /3 mL (0.083 %) nebulizer solution Take 3 mL (2.5 mg) by nebulization every 6 hours.      albuterol 90 mcg/actuation inhaler Inhale 2 puffs every 6 hours if needed for wheezing.      cetirizine (ZyrTEC) 10 mg tablet 1 tablet DAILY (route: oral)      erythromycin (Romycin) 5 mg/gram (0.5 %) ophthalmic ointment Apply to left eye 4 times a day.  APPLY 1/2 THIN LAYER      fluticasone (Flonase) 50 mcg/actuation nasal spray Administer 1 spray into each nostril once daily in the morning. Shake gently. Before first use, prime pump. After use, clean tip and replace cap      furosemide (Lasix) 20 mg tablet Take 2 tablets (40 mg) by mouth once daily in the morning AND 2 tablets (40 mg) once daily in the evening. 360 tablet 3    Lexapro 5 mg tablet 1 tablet (5 mg).      metoprolol tartrate (Lopressor) 25 mg tablet Take 1 tablet (25 mg) by mouth twice a day. Take in the morning and before bedtime      nitroglycerin (Nitrostat) 0.4 mg SL tablet Place 1 tablet (0.4 mg) under the tongue every 5 minutes if needed for chest pain.      omega 3-dha-epa-fish oil (Fish OiL) 1,200 (144-216) mg capsule Take 1 capsule (1,200 mg) by mouth.      omeprazole (PriLOSEC) 20 mg DR capsule Take 1 capsule (20 mg) by mouth once daily in the morning.      polyethylene glycol (Glycolax, Miralax) 17 gram/dose powder DISSOLVE 17 GRAMS IN WATER AND DRINK ONCE DAILY      spironolactone (Aldactone) 25 mg tablet Take 1 tablet (25 mg)  by mouth once daily.      triamcinolone (Kenalog) 0.1 % cream Apply topically.      warfarin (Coumadin) 2.5 mg tablet Take 1 tablet (2.5 mg) by mouth.       No current facility-administered medications for this visit.        ROS  Review of Systems   All other systems reviewed and are negative.      Physical Exam  Physical Exam  Constitutional:       Appearance: Normal appearance.   HENT:      Head: Normocephalic and atraumatic.   Cardiovascular:      Rate and Rhythm: Normal rate and regular rhythm.      Comments: IV/VI HSM from apex to left axilla  Pulmonary:      Effort: Pulmonary effort is normal.      Breath sounds: Normal breath sounds.   Abdominal:      General: Abdomen is flat.   Musculoskeletal:      Right lower leg: No edema.      Left lower leg: No edema.   Skin:     General: Skin is warm and dry.   Neurological:      General: No focal deficit present.      Mental Status: She is alert and oriented to person, place, and time.   Psychiatric:         Mood and Affect: Mood normal.         Behavior: Behavior normal.          EKG  Encounter Date: 01/28/25   ECG 12 Lead   Result Value    Ventricular Rate 70    Atrial Rate 70    OK Interval 376    QRS Duration 110    QT Interval 416    QTC Calculation(Bazett) 449    R Axis 104    T Axis -38    QRS Count 12    Q Onset 224    T Offset 432    QTC Fredericia 438    Narrative    Sinus rhythm with 1st degree AV block  Rightward axis  Septal infarct , age undetermined  ST & T wave abnormality, consider inferior ischemia  Abnormal ECG  When compared with ECG of 03-JAN-2024 13:58,  Sinus rhythm has replaced Atrial fibrillation  Confirmed by Calin Hernandez ([id number]) on 1/28/2025 12:46:04 PM       Problem List Items Addressed This Visit       CAD (coronary artery disease)    Hyperlipidemia    Mitral valve regurgitation - Primary     9/27/24 echocardiogram severe MR with LVEF = 50-55%, MitraClip refused by Dr. Richey at 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

## 2025-01-28 NOTE — ASSESSMENT & PLAN NOTE
9/27/24 echocardiogram severe MR with LVEF = 50-55%, MitraClip refused by Dr. Richey at [facility]

## 2025-04-30 ENCOUNTER — OFFICE VISIT (OUTPATIENT)
Dept: CARDIOLOGY | Facility: CLINIC | Age: OVER 89
End: 2025-04-30
Payer: COMMERCIAL

## 2025-04-30 VITALS
OXYGEN SATURATION: 97 % | WEIGHT: 109 LBS | DIASTOLIC BLOOD PRESSURE: 72 MMHG | HEIGHT: 61 IN | HEART RATE: 70 BPM | SYSTOLIC BLOOD PRESSURE: 118 MMHG | BODY MASS INDEX: 20.58 KG/M2

## 2025-04-30 DIAGNOSIS — Z86.73 S/P STROKE DUE TO CEREBROVASCULAR DISEASE: ICD-10-CM

## 2025-04-30 DIAGNOSIS — I50.32 CHRONIC DIASTOLIC CONGESTIVE HEART FAILURE: ICD-10-CM

## 2025-04-30 DIAGNOSIS — Z98.890 S/P BILATERAL CAROTID ENDARTERECTOMY: ICD-10-CM

## 2025-04-30 DIAGNOSIS — I34.0 NONRHEUMATIC MITRAL VALVE REGURGITATION: ICD-10-CM

## 2025-04-30 DIAGNOSIS — E11.42 POLYNEUROPATHY DUE TO TYPE 2 DIABETES MELLITUS (MULTI): ICD-10-CM

## 2025-04-30 DIAGNOSIS — I35.1 NONRHEUMATIC AORTIC VALVE INSUFFICIENCY: Primary | ICD-10-CM

## 2025-04-30 LAB
Q ONSET: 223 MS
QRS COUNT: 11 BEATS
QRS DURATION: 84 MS
QT INTERVAL: 380 MS
QTC CALCULATION(BAZETT): 407 MS
QTC FREDERICIA: 398 MS
R AXIS: 98 DEGREES
T AXIS: 223 DEGREES
T OFFSET: 413 MS
VENTRICULAR RATE: 69 BPM

## 2025-04-30 PROCEDURE — 3078F DIAST BP <80 MM HG: CPT | Performed by: INTERNAL MEDICINE

## 2025-04-30 PROCEDURE — 3074F SYST BP LT 130 MM HG: CPT | Performed by: INTERNAL MEDICINE

## 2025-04-30 PROCEDURE — 1160F RVW MEDS BY RX/DR IN RCRD: CPT | Performed by: INTERNAL MEDICINE

## 2025-04-30 PROCEDURE — 93005 ELECTROCARDIOGRAM TRACING: CPT | Performed by: INTERNAL MEDICINE

## 2025-04-30 PROCEDURE — 99214 OFFICE O/P EST MOD 30 MIN: CPT | Performed by: INTERNAL MEDICINE

## 2025-04-30 PROCEDURE — 1159F MED LIST DOCD IN RCRD: CPT | Performed by: INTERNAL MEDICINE

## 2025-04-30 PROCEDURE — 93010 ELECTROCARDIOGRAM REPORT: CPT | Performed by: INTERNAL MEDICINE

## 2025-04-30 PROCEDURE — 1036F TOBACCO NON-USER: CPT | Performed by: INTERNAL MEDICINE

## 2025-04-30 RX ORDER — EMPAGLIFLOZIN 10 MG/1
10 TABLET, FILM COATED ORAL
COMMUNITY
Start: 2025-02-27

## 2025-04-30 NOTE — PROGRESS NOTES
"  Subjective  Soledad Melendrez  is a 89 y.o. year old female who presents for mitral regurgitation.  LARA with ambulation but is good while sitting,  No chest pain no palpitations, No edema    Blood pressure 118/72, pulse 70, height 1.549 m (5' 1\"), weight 49.4 kg (109 lb), SpO2 97%.   Influenza virus vaccines, Lisinopril, Morphine, Nsaids (non-steroidal anti-inflammatory drug), Pneumococcal vaccine, and Lovastatin  Medical History[1]  Surgical History[2]  Family History[3]  @SOC    Current Medications[4]     ROS  Review of Systems   All other systems reviewed and are negative.      Physical Exam  Physical Exam  Constitutional:       Appearance: Normal appearance.   HENT:      Head: Normocephalic and atraumatic.   Cardiovascular:      Rate and Rhythm: Normal rate. Rhythm irregularly irregular.      Comments: IV/Vi HSM from left apex to left axilla  Pulmonary:      Effort: Pulmonary effort is normal.   Abdominal:      General: Abdomen is flat.   Musculoskeletal:      Right lower leg: No edema.      Left lower leg: No edema.   Skin:     General: Skin is warm and dry.   Neurological:      General: No focal deficit present.      Mental Status: She is alert and oriented to person, place, and time.   Psychiatric:         Mood and Affect: Mood normal.         Behavior: Behavior normal.          EKG  Encounter Date: 04/30/25   ECG 12 Lead   Result Value    Ventricular Rate 69    QRS Duration 84    QT Interval 380    QTC Calculation(Bazett) 407    R Axis 98    T Axis 223    QRS Count 11    Q Onset 223    T Offset 413    QTC Fredericia 398    Narrative    Atrial fibrillation  Rightward axis  ST & T wave abnormality, consider inferolateral ischemia  Abnormal ECG  When compared with ECG of 28-JAN-2025 11:32,  Atrial fibrillation has replaced Sinus rhythm  ST no longer depressed in Inferior leads  ST now depressed in Lateral leads  T wave inversion now evident in Lateral leads       Problem List Items Addressed This Visit  "      Mitral valve regurgitation    9/27/24 echocardiogrfam severe MR with LVEF = 50-55%. MitraClip refused by Dr. 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)    S/P stroke due to cerebrovascular disease    Aortic valve regurgitation - Primary    Relevant Orders    ECG 12 Lead (Completed)     Other Visit Diagnoses         Chronic diastolic congestive heart failure                  Same meds  Return 3 months with EKG      Calin Hernandez MD        [1]   Past Medical History:  Diagnosis Date    Old myocardial infarction     History of myocardial infarction    Onycholysis 12/01/2023    Personal history of other diseases of the circulatory system     History of coronary artery disease    Personal history of other diseases of the circulatory system 06/30/2020    History of hypertension    Personal history of other diseases of the circulatory system     History of hypertension    Personal history of other diseases of the musculoskeletal system and connective tissue     History of arthritis    Personal history of other diseases of the respiratory system     History of lung disease    Personal history of other endocrine, nutritional and metabolic disease     History of thyroid disorder    Personal history of other endocrine, nutritional and metabolic disease 09/01/2016    History of type 2 diabetes mellitus    Personal history of other endocrine, nutritional and metabolic disease     History of hyperlipidemia    Personal history of other endocrine, nutritional and metabolic disease     History of hypothyroidism    Unspecified cataract     Cataracts, bilateral    Unspecified hearing loss, right ear 01/12/2019    Hearing loss on right   [2]   Past Surgical History:  Procedure Laterality Date    APPENDECTOMY  09/01/2016    Appendectomy    CORONARY ARTERY BYPASS GRAFT  09/01/2016    CABG    OTHER SURGICAL HISTORY  09/01/2016    Endarterectomy Carotid  Artery    TONSILLECTOMY  09/01/2016    Tonsillectomy With Adenoidectomy   [3]   Family History  Problem Relation Name Age of Onset    Stroke Father      Hypertension Father      Diabetes Sister      Diabetes Brother      Hypertension Brother     [4]   Current Outpatient Medications   Medication Sig Dispense Refill    Jardiance 10 mg tablet 1 tablet (10 mg).      acetaminophen (Tylenol Arthritis Pain) 650 mg ER tablet Take 1 tablet (650 mg) by mouth.      albuterol 2.5 mg /3 mL (0.083 %) nebulizer solution Take 3 mL (2.5 mg) by nebulization every 6 hours.      albuterol 90 mcg/actuation inhaler Inhale 2 puffs every 6 hours if needed for wheezing.      atorvastatin (Lipitor) 40 mg tablet Take 1 tablet (40 mg) by mouth early in the morning..      cetirizine (ZyrTEC) 10 mg tablet 1 tablet DAILY (route: oral)      erythromycin (Romycin) 5 mg/gram (0.5 %) ophthalmic ointment Apply to left eye 4 times a day.  APPLY 1/2 THIN LAYER      fluticasone (Flonase) 50 mcg/actuation nasal spray Administer 1 spray into each nostril once daily in the morning. Shake gently. Before first use, prime pump. After use, clean tip and replace cap      furosemide (Lasix) 20 mg tablet Take 2 tablets (40 mg) by mouth once daily in the morning AND 2 tablets (40 mg) once daily in the evening. 360 tablet 3    levothyroxine (Synthroid, Levoxyl) 50 mcg tablet Take 1 tablet (50 mcg) by mouth early in the morning..      Lexapro 5 mg tablet 1 tablet (5 mg).      metoprolol tartrate (Lopressor) 25 mg tablet Take 1 tablet (25 mg) by mouth twice a day. Take in the morning and before bedtime      nitroglycerin (Nitrostat) 0.4 mg SL tablet Place 1 tablet (0.4 mg) under the tongue every 5 minutes if needed for chest pain.      omega 3-dha-epa-fish oil (Fish OiL) 1,200 (144-216) mg capsule Take 1 capsule (1,200 mg) by mouth.      omeprazole (PriLOSEC) 20 mg DR capsule Take 1 capsule (20 mg) by mouth once daily in the morning.      polyethylene glycol  (Glycolax, Miralax) 17 gram/dose powder DISSOLVE 17 GRAMS IN WATER AND DRINK ONCE DAILY      spironolactone (Aldactone) 25 mg tablet Take 1 tablet (25 mg) by mouth once daily.      triamcinolone (Kenalog) 0.1 % cream Apply topically.      warfarin (Coumadin) 2.5 mg tablet Take 1 tablet (2.5 mg) by mouth.       No current facility-administered medications for this visit.

## 2025-04-30 NOTE — ASSESSMENT & PLAN NOTE
9/27/24 echocardiogrfam severe MR with LVEF = 50-55%. MitraClip refused by Dr. Richey at [facility]

## 2025-06-28 ENCOUNTER — APPOINTMENT (OUTPATIENT)
Dept: RADIOLOGY | Facility: HOSPITAL | Age: OVER 89
End: 2025-06-28
Payer: COMMERCIAL

## 2025-06-28 ENCOUNTER — HOSPITAL ENCOUNTER (INPATIENT)
Facility: HOSPITAL | Age: OVER 89
End: 2025-06-28
Attending: EMERGENCY MEDICINE | Admitting: SURGERY
Payer: COMMERCIAL

## 2025-06-28 DIAGNOSIS — Z79.01 ANTICOAGULATED ON COUMADIN: ICD-10-CM
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
## 2025-06-28 DIAGNOSIS — S42.031A CLOSED DISPLACED FRACTURE OF ACROMIAL END OF RIGHT CLAVICLE, INITIAL ENCOUNTER: ICD-10-CM

## 2025-06-28 DIAGNOSIS — I62.9 INTRACRANIAL BLEED (MULTI): Primary | ICD-10-CM

## 2025-06-28 LAB
ABO GROUP (TYPE) IN BLOOD: NORMAL
ALBUMIN SERPL BCP-MCNC: 4.4 G/DL (ref 3.4–5)
ALP SERPL-CCNC: 102 U/L (ref 33–136)
ALT SERPL W P-5'-P-CCNC: 13 U/L (ref 7–45)
ANION GAP SERPL CALC-SCNC: 17 MMOL/L (ref 10–20)
ANTIBODY SCREEN: NORMAL
AST SERPL W P-5'-P-CCNC: 26 U/L (ref 9–39)
BASOPHILS # BLD AUTO: 0.02 X10*3/UL (ref 0–0.1)
BASOPHILS NFR BLD AUTO: 0.2 %
BILIRUB SERPL-MCNC: 1.3 MG/DL (ref 0–1.2)
BUN SERPL-MCNC: 48 MG/DL (ref 6–23)
CALCIUM SERPL-MCNC: 9.9 MG/DL (ref 8.6–10.3)
CARDIAC TROPONIN I PNL SERPL HS: 16 NG/L (ref 0–13)
CARDIAC TROPONIN I PNL SERPL HS: 18 NG/L (ref 0–13)
CHLORIDE SERPL-SCNC: 96 MMOL/L (ref 98–107)
CK SERPL-CCNC: 43 U/L (ref 0–215)
CO2 SERPL-SCNC: 31 MMOL/L (ref 21–32)
CREAT SERPL-MCNC: 1.38 MG/DL (ref 0.5–1.05)
EGFRCR SERPLBLD CKD-EPI 2021: 36 ML/MIN/1.73M*2
EOSINOPHIL # BLD AUTO: 0.08 X10*3/UL (ref 0–0.4)
EOSINOPHIL NFR BLD AUTO: 0.9 %
ERYTHROCYTE [DISTWIDTH] IN BLOOD BY AUTOMATED COUNT: 14.3 % (ref 11.5–14.5)
ERYTHROCYTE [DISTWIDTH] IN BLOOD BY AUTOMATED COUNT: 14.8 % (ref 11.5–14.5)
GLUCOSE BLD MANUAL STRIP-MCNC: 149 MG/DL (ref 74–99)
GLUCOSE SERPL-MCNC: 182 MG/DL (ref 74–99)
HCT VFR BLD AUTO: 44.3 % (ref 36–46)
HCT VFR BLD AUTO: 44.3 % (ref 36–46)
HGB BLD-MCNC: 14.4 G/DL (ref 12–16)
HGB BLD-MCNC: 14.5 G/DL (ref 12–16)
IMM GRANULOCYTES # BLD AUTO: 0.04 X10*3/UL (ref 0–0.5)
IMM GRANULOCYTES NFR BLD AUTO: 0.4 % (ref 0–0.9)
INR PPP: 1.8 (ref 0.9–1.1)
INR PPP: 2.7 (ref 0.9–1.1)
LACTATE SERPL-SCNC: 1.4 MMOL/L (ref 0.4–2)
LYMPHOCYTES # BLD AUTO: 0.73 X10*3/UL (ref 0.8–3)
LYMPHOCYTES NFR BLD AUTO: 8.1 %
MCH RBC QN AUTO: 33.3 PG (ref 26–34)
MCH RBC QN AUTO: 34.4 PG (ref 26–34)
MCHC RBC AUTO-ENTMCNC: 32.5 G/DL (ref 32–36)
MCHC RBC AUTO-ENTMCNC: 32.7 G/DL (ref 32–36)
MCV RBC AUTO: 102 FL (ref 80–100)
MCV RBC AUTO: 105 FL (ref 80–100)
MONOCYTES # BLD AUTO: 0.77 X10*3/UL (ref 0.05–0.8)
MONOCYTES NFR BLD AUTO: 8.6 %
NEUTROPHILS # BLD AUTO: 7.36 X10*3/UL (ref 1.6–5.5)
NEUTROPHILS NFR BLD AUTO: 81.8 %
NRBC BLD-RTO: 0 /100 WBCS (ref 0–0)
NRBC BLD-RTO: 0 /100 WBCS (ref 0–0)
PLATELET # BLD AUTO: 147 X10*3/UL (ref 150–450)
PLATELET # BLD AUTO: 148 X10*3/UL (ref 150–450)
POTASSIUM SERPL-SCNC: 4.4 MMOL/L (ref 3.5–5.3)
PROT SERPL-MCNC: 6.8 G/DL (ref 6.4–8.2)
PROTHROMBIN TIME: 19.5 SECONDS (ref 9.8–12.4)
PROTHROMBIN TIME: 30.4 SECONDS (ref 9.8–12.4)
RBC # BLD AUTO: 4.22 X10*6/UL (ref 4–5.2)
RBC # BLD AUTO: 4.33 X10*6/UL (ref 4–5.2)
RH FACTOR (ANTIGEN D): NORMAL
SODIUM SERPL-SCNC: 140 MMOL/L (ref 136–145)
WBC # BLD AUTO: 9 X10*3/UL (ref 4.4–11.3)
WBC # BLD AUTO: 9.2 X10*3/UL (ref 4.4–11.3)

## 2025-06-28 PROCEDURE — 72131 CT LUMBAR SPINE W/O DYE: CPT

## 2025-06-28 PROCEDURE — 2020000001 HC ICU ROOM DAILY

## 2025-06-28 PROCEDURE — 2500000004 HC RX 250 GENERAL PHARMACY W/ HCPCS (ALT 636 FOR OP/ED)

## 2025-06-28 PROCEDURE — 36430 TRANSFUSION BLD/BLD COMPNT: CPT

## 2025-06-28 PROCEDURE — P9017 PLASMA 1 DONOR FRZ W/IN 8 HR: HCPCS

## 2025-06-28 PROCEDURE — 80053 COMPREHEN METABOLIC PANEL: CPT | Performed by: NURSE PRACTITIONER

## 2025-06-28 PROCEDURE — 73030 X-RAY EXAM OF SHOULDER: CPT | Mod: RT

## 2025-06-28 PROCEDURE — 82947 ASSAY GLUCOSE BLOOD QUANT: CPT

## 2025-06-28 PROCEDURE — 99291 CRITICAL CARE FIRST HOUR: CPT | Performed by: NURSE PRACTITIONER

## 2025-06-28 PROCEDURE — 72131 CT LUMBAR SPINE W/O DYE: CPT | Performed by: RADIOLOGY

## 2025-06-28 PROCEDURE — 85610 PROTHROMBIN TIME: CPT | Performed by: NURSE PRACTITIONER

## 2025-06-28 PROCEDURE — 2550000001 HC RX 255 CONTRASTS: Performed by: EMERGENCY MEDICINE

## 2025-06-28 PROCEDURE — 70450 CT HEAD/BRAIN W/O DYE: CPT | Performed by: RADIOLOGY

## 2025-06-28 PROCEDURE — 72128 CT CHEST SPINE W/O DYE: CPT

## 2025-06-28 PROCEDURE — 70450 CT HEAD/BRAIN W/O DYE: CPT

## 2025-06-28 PROCEDURE — 73030 X-RAY EXAM OF SHOULDER: CPT | Mod: RIGHT SIDE | Performed by: RADIOLOGY

## 2025-06-28 PROCEDURE — 2500000005 HC RX 250 GENERAL PHARMACY W/O HCPCS

## 2025-06-28 PROCEDURE — 2500000004 HC RX 250 GENERAL PHARMACY W/ HCPCS (ALT 636 FOR OP/ED): Performed by: NURSE PRACTITIONER

## 2025-06-28 PROCEDURE — G0390 TRAUMA RESPONS W/HOSP CRITI: HCPCS

## 2025-06-28 PROCEDURE — 99223 1ST HOSP IP/OBS HIGH 75: CPT

## 2025-06-28 PROCEDURE — 83605 ASSAY OF LACTIC ACID: CPT | Performed by: NURSE PRACTITIONER

## 2025-06-28 PROCEDURE — 85610 PROTHROMBIN TIME: CPT | Performed by: INTERNAL MEDICINE

## 2025-06-28 PROCEDURE — 72128 CT CHEST SPINE W/O DYE: CPT | Performed by: RADIOLOGY

## 2025-06-28 PROCEDURE — 30233K1 TRANSFUSION OF NONAUTOLOGOUS FROZEN PLASMA INTO PERIPHERAL VEIN, PERCUTANEOUS APPROACH: ICD-10-PCS | Performed by: SURGERY

## 2025-06-28 PROCEDURE — 71260 CT THORAX DX C+: CPT | Performed by: RADIOLOGY

## 2025-06-28 PROCEDURE — 2500000005 HC RX 250 GENERAL PHARMACY W/O HCPCS: Performed by: EMERGENCY MEDICINE

## 2025-06-28 PROCEDURE — 85025 COMPLETE CBC W/AUTO DIFF WBC: CPT | Performed by: NURSE PRACTITIONER

## 2025-06-28 PROCEDURE — 74177 CT ABD & PELVIS W/CONTRAST: CPT

## 2025-06-28 PROCEDURE — 74177 CT ABD & PELVIS W/CONTRAST: CPT | Performed by: RADIOLOGY

## 2025-06-28 PROCEDURE — 72125 CT NECK SPINE W/O DYE: CPT

## 2025-06-28 PROCEDURE — 96361 HYDRATE IV INFUSION ADD-ON: CPT

## 2025-06-28 PROCEDURE — 70450 CT HEAD/BRAIN W/O DYE: CPT | Performed by: STUDENT IN AN ORGANIZED HEALTH CARE EDUCATION/TRAINING PROGRAM

## 2025-06-28 PROCEDURE — 36415 COLL VENOUS BLD VENIPUNCTURE: CPT | Performed by: NURSE PRACTITIONER

## 2025-06-28 PROCEDURE — 85027 COMPLETE CBC AUTOMATED: CPT | Performed by: INTERNAL MEDICINE

## 2025-06-28 PROCEDURE — 84484 ASSAY OF TROPONIN QUANT: CPT | Performed by: NURSE PRACTITIONER

## 2025-06-28 PROCEDURE — 72125 CT NECK SPINE W/O DYE: CPT | Performed by: RADIOLOGY

## 2025-06-28 PROCEDURE — 86901 BLOOD TYPING SEROLOGIC RH(D): CPT

## 2025-06-28 PROCEDURE — 82550 ASSAY OF CK (CPK): CPT | Performed by: NURSE PRACTITIONER

## 2025-06-28 PROCEDURE — 96374 THER/PROPH/DIAG INJ IV PUSH: CPT | Mod: 59

## 2025-06-28 PROCEDURE — 96365 THER/PROPH/DIAG IV INF INIT: CPT

## 2025-06-28 PROCEDURE — 99285 EMERGENCY DEPT VISIT HI MDM: CPT | Mod: 25 | Performed by: EMERGENCY MEDICINE

## 2025-06-28 PROCEDURE — 2500000001 HC RX 250 WO HCPCS SELF ADMINISTERED DRUGS (ALT 637 FOR MEDICARE OP)

## 2025-06-28 RX ORDER — HYDRALAZINE HYDROCHLORIDE 20 MG/ML
10 INJECTION INTRAMUSCULAR; INTRAVENOUS
Status: DISCONTINUED | OUTPATIENT
Start: 2025-06-28 | End: 2025-06-30

## 2025-06-28 RX ORDER — HYDROMORPHONE HYDROCHLORIDE 1 MG/ML
0.8 INJECTION, SOLUTION INTRAMUSCULAR; INTRAVENOUS; SUBCUTANEOUS EVERY 4 HOURS PRN
Status: DISCONTINUED | OUTPATIENT
Start: 2025-06-28 | End: 2025-06-28

## 2025-06-28 RX ORDER — ALBUTEROL SULFATE 2.5 MG/.5ML
2.5 SOLUTION RESPIRATORY (INHALATION) EVERY 6 HOURS PRN
COMMUNITY

## 2025-06-28 RX ORDER — SODIUM CHLORIDE 9 MG/ML
60 INJECTION, SOLUTION INTRAVENOUS CONTINUOUS
Status: DISCONTINUED | OUTPATIENT
Start: 2025-06-28 | End: 2025-06-29

## 2025-06-28 RX ORDER — LEVOTHYROXINE SODIUM 50 UG/1
50 TABLET ORAL DAILY
Status: DISCONTINUED | OUTPATIENT
Start: 2025-06-29 | End: 2025-07-01 | Stop reason: HOSPADM

## 2025-06-28 RX ORDER — ACETAMINOPHEN 325 MG/1
975 TABLET ORAL 3 TIMES DAILY
Status: DISCONTINUED | OUTPATIENT
Start: 2025-06-28 | End: 2025-06-28

## 2025-06-28 RX ORDER — METOPROLOL TARTRATE 25 MG/1
25 TABLET, FILM COATED ORAL 2 TIMES DAILY
Status: DISCONTINUED | OUTPATIENT
Start: 2025-06-28 | End: 2025-07-01 | Stop reason: HOSPADM

## 2025-06-28 RX ORDER — PANTOPRAZOLE SODIUM 40 MG/10ML
40 INJECTION, POWDER, LYOPHILIZED, FOR SOLUTION INTRAVENOUS EVERY MORNING
Status: DISCONTINUED | OUTPATIENT
Start: 2025-06-29 | End: 2025-06-30

## 2025-06-28 RX ORDER — ESCITALOPRAM OXALATE 5 MG/1
5 TABLET ORAL DAILY
COMMUNITY

## 2025-06-28 RX ORDER — DEXTROSE 50 % IN WATER (D50W) INTRAVENOUS SYRINGE
12.5
Status: DISCONTINUED | OUTPATIENT
Start: 2025-06-28 | End: 2025-07-01 | Stop reason: HOSPADM

## 2025-06-28 RX ORDER — METOPROLOL TARTRATE 25 MG/1
25 TABLET, FILM COATED ORAL
Status: CANCELLED | OUTPATIENT
Start: 2025-06-28

## 2025-06-28 RX ORDER — LIDOCAINE 560 MG/1
1 PATCH PERCUTANEOUS; TOPICAL; TRANSDERMAL DAILY
Status: DISCONTINUED | OUTPATIENT
Start: 2025-06-28 | End: 2025-06-28

## 2025-06-28 RX ORDER — ATORVASTATIN CALCIUM 20 MG/1
20 TABLET, FILM COATED ORAL DAILY
Status: DISCONTINUED | OUTPATIENT
Start: 2025-06-28 | End: 2025-07-01 | Stop reason: HOSPADM

## 2025-06-28 RX ORDER — NALOXONE HYDROCHLORIDE 1 MG/ML
0.2 INJECTION INTRAMUSCULAR; INTRAVENOUS; SUBCUTANEOUS EVERY 5 MIN PRN
Status: DISCONTINUED | OUTPATIENT
Start: 2025-06-28 | End: 2025-07-01 | Stop reason: HOSPADM

## 2025-06-28 RX ORDER — SPIRONOLACTONE 25 MG/1
25 TABLET ORAL DAILY
Status: DISCONTINUED | OUTPATIENT
Start: 2025-06-28 | End: 2025-07-01 | Stop reason: HOSPADM

## 2025-06-28 RX ORDER — ESCITALOPRAM OXALATE 10 MG/1
5 TABLET ORAL DAILY
Status: DISCONTINUED | OUTPATIENT
Start: 2025-06-28 | End: 2025-07-01 | Stop reason: HOSPADM

## 2025-06-28 RX ORDER — LABETALOL HYDROCHLORIDE 5 MG/ML
10 INJECTION, SOLUTION INTRAVENOUS EVERY 10 MIN PRN
Status: DISCONTINUED | OUTPATIENT
Start: 2025-06-28 | End: 2025-06-30

## 2025-06-28 RX ORDER — DEXTROSE 50 % IN WATER (D50W) INTRAVENOUS SYRINGE
25
Status: DISCONTINUED | OUTPATIENT
Start: 2025-06-28 | End: 2025-07-01 | Stop reason: HOSPADM

## 2025-06-28 RX ORDER — GABAPENTIN 100 MG/1
100 CAPSULE ORAL 2 TIMES DAILY
Status: DISCONTINUED | OUTPATIENT
Start: 2025-06-28 | End: 2025-07-01 | Stop reason: HOSPADM

## 2025-06-28 RX ORDER — LIDOCAINE 560 MG/1
1 PATCH PERCUTANEOUS; TOPICAL; TRANSDERMAL DAILY
Status: DISCONTINUED | OUTPATIENT
Start: 2025-06-28 | End: 2025-07-01 | Stop reason: HOSPADM

## 2025-06-28 RX ORDER — HYDROMORPHONE HYDROCHLORIDE 0.2 MG/ML
0.2 INJECTION INTRAMUSCULAR; INTRAVENOUS; SUBCUTANEOUS EVERY 4 HOURS PRN
Status: DISCONTINUED | OUTPATIENT
Start: 2025-06-28 | End: 2025-07-01 | Stop reason: HOSPADM

## 2025-06-28 RX ORDER — GABAPENTIN 100 MG/1
100 CAPSULE ORAL 2 TIMES DAILY
Status: DISCONTINUED | OUTPATIENT
Start: 2025-06-28 | End: 2025-06-28

## 2025-06-28 RX ORDER — ATORVASTATIN CALCIUM 20 MG/1
1 TABLET, FILM COATED ORAL DAILY
COMMUNITY

## 2025-06-28 RX ORDER — INSULIN LISPRO 100 [IU]/ML
0-10 INJECTION, SOLUTION INTRAVENOUS; SUBCUTANEOUS EVERY 4 HOURS
Status: CANCELLED | OUTPATIENT
Start: 2025-06-28

## 2025-06-28 RX ORDER — ACETAMINOPHEN 325 MG/1
975 TABLET ORAL 3 TIMES DAILY
Status: DISCONTINUED | OUTPATIENT
Start: 2025-06-28 | End: 2025-07-01 | Stop reason: HOSPADM

## 2025-06-28 RX ORDER — HYDROMORPHONE HYDROCHLORIDE 0.2 MG/ML
0.2 INJECTION INTRAMUSCULAR; INTRAVENOUS; SUBCUTANEOUS EVERY 4 HOURS PRN
Status: DISCONTINUED | OUTPATIENT
Start: 2025-06-28 | End: 2025-06-28

## 2025-06-28 RX ORDER — INSULIN LISPRO 100 [IU]/ML
0-5 INJECTION, SOLUTION INTRAVENOUS; SUBCUTANEOUS EVERY 4 HOURS
Status: DISCONTINUED | OUTPATIENT
Start: 2025-06-28 | End: 2025-06-29

## 2025-06-28 RX ORDER — NICARDIPINE HYDROCHLORIDE 0.2 MG/ML
0-15 INJECTION INTRAVENOUS CONTINUOUS PRN
Status: DISCONTINUED | OUTPATIENT
Start: 2025-06-28 | End: 2025-06-30

## 2025-06-28 RX ORDER — DEXTROSE 50 % IN WATER (D50W) INTRAVENOUS SYRINGE
25
Status: CANCELLED | OUTPATIENT
Start: 2025-06-28

## 2025-06-28 RX ORDER — DEXTROSE 50 % IN WATER (D50W) INTRAVENOUS SYRINGE
12.5
Status: CANCELLED | OUTPATIENT
Start: 2025-06-28

## 2025-06-28 RX ORDER — ALBUTEROL SULFATE 2.5 MG/.5ML
2.5 SOLUTION RESPIRATORY (INHALATION) EVERY 6 HOURS PRN
Status: DISCONTINUED | OUTPATIENT
Start: 2025-06-28 | End: 2025-07-01 | Stop reason: HOSPADM

## 2025-06-28 RX ADMIN — SODIUM CHLORIDE, SODIUM LACTATE, POTASSIUM CHLORIDE, AND CALCIUM CHLORIDE 500 ML: .6; .31; .03; .02 INJECTION, SOLUTION INTRAVENOUS at 12:28

## 2025-06-28 RX ADMIN — METOPROLOL TARTRATE 25 MG: 25 TABLET, FILM COATED ORAL at 21:55

## 2025-06-28 RX ADMIN — PHYTONADIONE 10 MG: 10 INJECTION, EMULSION INTRAMUSCULAR; INTRAVENOUS; SUBCUTANEOUS at 11:58

## 2025-06-28 RX ADMIN — IOHEXOL 75 ML: 350 INJECTION, SOLUTION INTRAVENOUS at 13:44

## 2025-06-28 RX ADMIN — Medication 4 L/MIN: at 16:45

## 2025-06-28 RX ADMIN — LIDOCAINE 1 PATCH: 4 PATCH TOPICAL at 21:55

## 2025-06-28 RX ADMIN — GABAPENTIN 100 MG: 100 CAPSULE ORAL at 21:55

## 2025-06-28 RX ADMIN — Medication 4 L/MIN: at 21:55

## 2025-06-28 RX ADMIN — HYDRALAZINE HYDROCHLORIDE 10 MG: 20 INJECTION INTRAMUSCULAR; INTRAVENOUS at 13:51

## 2025-06-28 RX ADMIN — ACETAMINOPHEN 975 MG: 325 TABLET ORAL at 21:54

## 2025-06-28 RX ADMIN — SODIUM CHLORIDE 60 ML/HR: 900 INJECTION, SOLUTION INTRAVENOUS at 19:29

## 2025-06-28 SDOH — SOCIAL STABILITY: SOCIAL INSECURITY: ABUSE: ADULT

## 2025-06-28 SDOH — SOCIAL STABILITY: SOCIAL INSECURITY: HAVE YOU HAD ANY THOUGHTS OF HARMING ANYONE ELSE?: NO

## 2025-06-28 SDOH — ECONOMIC STABILITY: HOUSING INSECURITY: AT ANY TIME IN THE PAST 12 MONTHS, WERE YOU HOMELESS OR LIVING IN A SHELTER (INCLUDING NOW)?: NO

## 2025-06-28 SDOH — SOCIAL STABILITY: SOCIAL INSECURITY: WITHIN THE LAST YEAR, HAVE YOU BEEN AFRAID OF YOUR PARTNER OR EX-PARTNER?: NO

## 2025-06-28 SDOH — ECONOMIC STABILITY: FOOD INSECURITY: WITHIN THE PAST 12 MONTHS, THE FOOD YOU BOUGHT JUST DIDN'T LAST AND YOU DIDN'T HAVE MONEY TO GET MORE.: NEVER TRUE

## 2025-06-28 SDOH — ECONOMIC STABILITY: FOOD INSECURITY: WITHIN THE PAST 12 MONTHS, YOU WORRIED THAT YOUR FOOD WOULD RUN OUT BEFORE YOU GOT THE MONEY TO BUY MORE.: NEVER TRUE

## 2025-06-28 SDOH — SOCIAL STABILITY: SOCIAL INSECURITY: ARE YOU OR HAVE YOU BEEN THREATENED OR ABUSED PHYSICALLY, EMOTIONALLY, OR SEXUALLY BY ANYONE?: NO

## 2025-06-28 SDOH — SOCIAL STABILITY: SOCIAL INSECURITY
WITHIN THE LAST YEAR, HAVE YOU BEEN KICKED, HIT, SLAPPED, OR OTHERWISE PHYSICALLY HURT BY YOUR PARTNER OR EX-PARTNER?: NO

## 2025-06-28 SDOH — ECONOMIC STABILITY: HOUSING INSECURITY: IN THE LAST 12 MONTHS, WAS THERE A TIME WHEN YOU WERE NOT ABLE TO PAY THE MORTGAGE OR RENT ON TIME?: NO

## 2025-06-28 SDOH — ECONOMIC STABILITY: FOOD INSECURITY: HOW HARD IS IT FOR YOU TO PAY FOR THE VERY BASICS LIKE FOOD, HOUSING, MEDICAL CARE, AND HEATING?: NOT HARD AT ALL

## 2025-06-28 SDOH — ECONOMIC STABILITY: INCOME INSECURITY: IN THE PAST 12 MONTHS HAS THE ELECTRIC, GAS, OIL, OR WATER COMPANY THREATENED TO SHUT OFF SERVICES IN YOUR HOME?: NO

## 2025-06-28 SDOH — SOCIAL STABILITY: SOCIAL INSECURITY
WITHIN THE LAST YEAR, HAVE YOU BEEN RAPED OR FORCED TO HAVE ANY KIND OF SEXUAL ACTIVITY BY YOUR PARTNER OR EX-PARTNER?: NO

## 2025-06-28 SDOH — SOCIAL STABILITY: SOCIAL INSECURITY: HAVE YOU HAD THOUGHTS OF HARMING ANYONE ELSE?: NO

## 2025-06-28 SDOH — SOCIAL STABILITY: SOCIAL INSECURITY: WERE YOU ABLE TO COMPLETE ALL THE BEHAVIORAL HEALTH SCREENINGS?: YES

## 2025-06-28 SDOH — ECONOMIC STABILITY: HOUSING INSECURITY: IN THE PAST 12 MONTHS, HOW MANY TIMES HAVE YOU MOVED WHERE YOU WERE LIVING?: 1

## 2025-06-28 SDOH — SOCIAL STABILITY: SOCIAL INSECURITY: WITHIN THE LAST YEAR, HAVE YOU BEEN HUMILIATED OR EMOTIONALLY ABUSED IN OTHER WAYS BY YOUR PARTNER OR EX-PARTNER?: NO

## 2025-06-28 SDOH — SOCIAL STABILITY: SOCIAL INSECURITY: DO YOU FEEL UNSAFE GOING BACK TO THE PLACE WHERE YOU ARE LIVING?: NO

## 2025-06-28 SDOH — SOCIAL STABILITY: SOCIAL INSECURITY: DOES ANYONE TRY TO KEEP YOU FROM HAVING/CONTACTING OTHER FRIENDS OR DOING THINGS OUTSIDE YOUR HOME?: NO

## 2025-06-28 SDOH — SOCIAL STABILITY: SOCIAL INSECURITY: DO YOU FEEL ANYONE HAS EXPLOITED OR TAKEN ADVANTAGE OF YOU FINANCIALLY OR OF YOUR PERSONAL PROPERTY?: NO

## 2025-06-28 SDOH — SOCIAL STABILITY: SOCIAL INSECURITY: ARE THERE ANY APPARENT SIGNS OF INJURIES/BEHAVIORS THAT COULD BE RELATED TO ABUSE/NEGLECT?: NO

## 2025-06-28 SDOH — ECONOMIC STABILITY: TRANSPORTATION INSECURITY: IN THE PAST 12 MONTHS, HAS LACK OF TRANSPORTATION KEPT YOU FROM MEDICAL APPOINTMENTS OR FROM GETTING MEDICATIONS?: NO

## 2025-06-28 SDOH — SOCIAL STABILITY: SOCIAL INSECURITY: HAS ANYONE EVER THREATENED TO HURT YOUR FAMILY OR YOUR PETS?: NO

## 2025-06-28 ASSESSMENT — ACTIVITIES OF DAILY LIVING (ADL)
JUDGMENT_ADEQUATE_SAFELY_COMPLETE_DAILY_ACTIVITIES: YES
ASSISTIVE_DEVICE: EYEGLASSES;WALKER;CANE
LACK_OF_TRANSPORTATION: NO
WALKS IN HOME: NEEDS ASSISTANCE
HEARING - LEFT EAR: FUNCTIONAL
PATIENT'S MEMORY ADEQUATE TO SAFELY COMPLETE DAILY ACTIVITIES?: YES
BATHING: NEEDS ASSISTANCE
HEARING - LEFT EAR: FUNCTIONAL
WALKS IN HOME: NEEDS ASSISTANCE
HEARING - RIGHT EAR: FUNCTIONAL
BATHING: NEEDS ASSISTANCE
GROOMING: NEEDS ASSISTANCE
PATIENT'S MEMORY ADEQUATE TO SAFELY COMPLETE DAILY ACTIVITIES?: YES
FEEDING YOURSELF: NEEDS ASSISTANCE
DRESSING YOURSELF: NEEDS ASSISTANCE
HEARING - RIGHT EAR: FUNCTIONAL
DRESSING YOURSELF: NEEDS ASSISTANCE
JUDGMENT_ADEQUATE_SAFELY_COMPLETE_DAILY_ACTIVITIES: YES
GROOMING: NEEDS ASSISTANCE
ADEQUATE_TO_COMPLETE_ADL: YES
TOILETING: NEEDS ASSISTANCE
FEEDING YOURSELF: NEEDS ASSISTANCE
ADEQUATE_TO_COMPLETE_ADL: YES
TOILETING: NEEDS ASSISTANCE

## 2025-06-28 ASSESSMENT — COGNITIVE AND FUNCTIONAL STATUS - GENERAL
MOVING TO AND FROM BED TO CHAIR: A LOT
DRESSING REGULAR UPPER BODY CLOTHING: A LITTLE
PERSONAL GROOMING: A LITTLE
TOILETING: A LITTLE
MOVING FROM LYING ON BACK TO SITTING ON SIDE OF FLAT BED WITH BEDRAILS: A LITTLE
DRESSING REGULAR LOWER BODY CLOTHING: A LITTLE
MOBILITY SCORE: 13
HELP NEEDED FOR BATHING: A LITTLE
STANDING UP FROM CHAIR USING ARMS: A LOT
TURNING FROM BACK TO SIDE WHILE IN FLAT BAD: A LITTLE
PATIENT BASELINE BEDBOUND: NO
WALKING IN HOSPITAL ROOM: A LOT
DAILY ACTIVITIY SCORE: 19
CLIMB 3 TO 5 STEPS WITH RAILING: TOTAL

## 2025-06-28 ASSESSMENT — PAIN SCALES - GENERAL
PAINLEVEL_OUTOF10: 5 - MODERATE PAIN
PAINLEVEL_OUTOF10: 5 - MODERATE PAIN

## 2025-06-28 ASSESSMENT — PATIENT HEALTH QUESTIONNAIRE - PHQ9
2. FEELING DOWN, DEPRESSED OR HOPELESS: NOT AT ALL
SUM OF ALL RESPONSES TO PHQ9 QUESTIONS 1 & 2: 0
1. LITTLE INTEREST OR PLEASURE IN DOING THINGS: NOT AT ALL

## 2025-06-28 ASSESSMENT — LIFESTYLE VARIABLES
SKIP TO QUESTIONS 9-10: 1
EVER FELT BAD OR GUILTY ABOUT YOUR DRINKING: NO
HOW OFTEN DO YOU HAVE 6 OR MORE DRINKS ON ONE OCCASION: NEVER
EVER HAD A DRINK FIRST THING IN THE MORNING TO STEADY YOUR NERVES TO GET RID OF A HANGOVER: NO
TOTAL SCORE: 0
HOW MANY STANDARD DRINKS CONTAINING ALCOHOL DO YOU HAVE ON A TYPICAL DAY: PATIENT DOES NOT DRINK
AUDIT-C TOTAL SCORE: 0
AUDIT-C TOTAL SCORE: 0
HAVE YOU EVER FELT YOU SHOULD CUT DOWN ON YOUR DRINKING: NO
HOW OFTEN DO YOU HAVE A DRINK CONTAINING ALCOHOL: NEVER
HAVE PEOPLE ANNOYED YOU BY CRITICIZING YOUR DRINKING: NO

## 2025-06-28 ASSESSMENT — PAIN - FUNCTIONAL ASSESSMENT
PAIN_FUNCTIONAL_ASSESSMENT: 0-10
PAIN_FUNCTIONAL_ASSESSMENT: 0-10

## 2025-06-28 ASSESSMENT — PAIN DESCRIPTION - LOCATION: LOCATION: SHOULDER

## 2025-06-28 ASSESSMENT — VISUAL ACUITY: OU: 1

## 2025-06-28 NOTE — PROGRESS NOTES
Pharmacy Medication History Review    Soledad Melendrez is a 89 y.o. female admitted for No Principal Problem: There is no principal problem currently on the Problem List. Please update the Problem List and refresh.. Pharmacy reviewed the patient's [id number] medications and allergies for accuracy.    The list below reflectives the updated PTA list. Please review each medication in order reconciliation for additional clarification and justification.  Prior to Admission medications    Medication Sig Start Date End Date Authorizing Provider   acetaminophen (Tylenol Arthritis Pain) 650 mg ER tablet Take 1 tablet (650 mg) by mouth once daily as needed for mild pain (1 - 3).   Historical Provider, MD   albuterol 2.5 mg/0.5 mL nebulizer solution Take 0.5 mL (2.5 mg) by nebulization every 6 hours if needed for wheezing or shortness of breath.   Historical Provider, MD   atorvastatin (Lipitor) 20 mg tablet Take 1 tablet (20 mg) by mouth once daily.   Historical Provider, MD   empagliflozin (Jardiance) 10 mg tablet Take 1 tablet (10 mg) by mouth once daily.   Historical Provider, MD   escitalopram (Lexapro) 5 mg tablet Take 1 tablet (5 mg) by mouth once daily.   Historical Provider, MD   fluticasone (Flonase) 50 mcg/actuation nasal spray Administer 1 spray into each nostril once daily at bedtime. Shake gently. Before first use, prime pump. After use, clean tip and replace cap   Historical Provider, MD   furosemide (Lasix) 20 mg tablet Take 2 tablets (40 mg) by mouth once daily in the morning AND 2 tablets (40 mg) once daily in the evening.   Calin Hernandez MD   levothyroxine (Synthroid, Levoxyl) 50 mcg tablet Take 1 tablet (50 mcg) by mouth early in the morning..   Historical Provider, MD   metoprolol tartrate (Lopressor) 25 mg tablet Take 1 tablet (25 mg) by mouth twice a day.   Historical Provider, MD   omeprazole (PriLOSEC) 20 mg DR capsule Take 1 capsule (20 mg) by mouth once daily.   Historical Provider  MD   spironolactone (Aldactone) 25 mg tablet Take 1 tablet (25 mg) by mouth once daily.   Historical Provider, MD   warfarin (Coumadin) 2.5 mg tablet Take 1 tablet (2.5 mg) by mouth see administration instructions. Friday (6/27): 1 tablet, Saturday (6/28): 2 tablets, Sunday (6/29): 1 tablet, Monday (6/30): 2 tablets, Tuesday (7/1): 2 tablets, Wednesday (7/2): 2.5 tablets, Thursday (7/3): 1.5 tablet   Historical Provider, MD   albuterol 90 mcg/actuation inhaler Inhale 2 puffs every 6 hours if needed for wheezing.   Historical Provider, MD   nitroglycerin (Nitrostat) 0.4 mg SL tablet Place 1 tablet (0.4 mg) under the tongue every 5 minutes if needed for chest pain.   Historical Provider, MD   polyethylene glycol (Glycolax, Miralax) 17 gram/dose powder Mix 17 g of powder and drink once daily as needed.   Historical Provider, MD        The list below reflectives the updated allergy list. Please review each documented allergy for additional clarification and justification.  Allergies  Reviewed by Crystal Alanis RN on 6/28/2025        Severity Reactions Comments    Influenza Virus Vaccines Medium Other States she became sick every time    Lisinopril Medium Cough     Morphine Medium Hives     Nsaids (non-steroidal Anti-inflammatory Drug) Medium Other CKD    Pneumococcal Vaccine Not Specified Unknown     Lovastatin Low Other Tingling             Below are additional concerns with the patient's PTA list.      Matilda Robin

## 2025-06-28 NOTE — H&P
"Trauma History and Physical        Subjective   Patient is a 89 y.o. female admitted on 6/28/2025 10:16 AM  Activation Date: 6/28/25  Activation Time: 10:09  Trauma Activation Level: limited  Date of injury: 6/28/25  Time of injury: 0400    HPI: Soledad Melendrez is a 89 y.o. female with PMH of T2DM, AAA, A fib, CAD, HLD, HTN, mitral/aortic/tricuspid valve regurg, hypothyroidism, and GERD who was BIBA due to a fall. Patients granddaughter, Natividad, is at bedside and helps supplement and translate for the patient who primarily speaks Japanese. Patient reportedly was on the bedside commode early this morning when she fell to her R side onto an oxygen compressor. Patient reported no pain at that time and went back to sleep. When she woke up then she started complaining of pain. Patient states she felt weak and \"things went black\" when she fell off commode. At this time she is complaining of pain to her head and R shoulder. Denies vision changes.     ED course revealed tachypnea and tachycardia. Remaining vitals HDS. YRN. CT head revealed intracranail hemorrhage of occipital horn, atrium, and body of right lateral ventricle. XR R shoulder revealed slightly displaced distal R clavicular fracture. CT of lumbar/thoracic/cervical spine and C/A/P all revealed no further acute findings.     Mechanism of injury: fall  Method of Arrival: EMS  Prior to arrival: N/A    PRIMARY SURVEY:  Airway: intact  Breathing: equal breath sounds  Circulation: pulses intact and equal  Disability:  GCS 15, A&Ox4  Exposure/Environment: Undressed and properly covered throughout exam    Procedures: None    SECONDARY SURVEY:    Medical History[1]  Surgical History[2]  Prescriptions Prior to Admission[3]  Influenza virus vaccines, Lisinopril, Morphine, Nsaids (non-steroidal anti-inflammatory drug), Pneumococcal vaccine, and Lovastatin  Social History[4]  Family History[5]    Objective   Vitals:  Most Recent:  Vitals:    06/28/25 1345   BP: 155/73 "   Pulse: (!) 101   Resp: (!) 61   Temp:    SpO2: 95%       24hr Min/Max:  Temp  Min: 36.4 °C (97.5 °F)  Max: 36.6 °C (97.9 °F)  Pulse  Min: 81  Max: 101  BP  Min: 118/72  Max: 167/80  Resp  Min: 11  Max: 61  SpO2  Min: 95 %  Max: 100 %    Hemodynamic parameters for last 24 hours:       No intake/output data recorded.      Physical exam:    Physical Exam  Vitals reviewed.   HENT:      Head: Normocephalic.      Comments: Slightly tender to R side     Nose: Nose normal.   Eyes:      Extraocular Movements: Extraocular movements intact.      Pupils: Pupils are equal, round, and reactive to light.   Cardiovascular:      Rate and Rhythm: Normal rate. Rhythm irregular.   Pulmonary:      Effort: Pulmonary effort is normal. No respiratory distress.      Breath sounds: Normal breath sounds.   Abdominal:      General: Bowel sounds are normal.      Palpations: Abdomen is soft.      Tenderness: There is abdominal tenderness (mild tenderness).   Musculoskeletal:      Cervical back: Tenderness present.      Comments: R shoulder very tender. Sling currently on. Strength R hand decreased. Sensation intact to all extremities. Nontender to LUE and BLE   Skin:     General: Skin is warm and dry.   Neurological:      Mental Status: She is alert. Mental status is at baseline.   Psychiatric:         Mood and Affect: Mood normal.         Behavior: Behavior normal.         Lab/Radiology/Diagnostic Review:  Results for orders placed or performed during the hospital encounter of 06/28/25 (from the past 24 hours)   CBC and Auto Differential   Result Value Ref Range    WBC 9.0 4.4 - 11.3 x10*3/uL    nRBC 0.0 0.0 - 0.0 /100 WBCs    RBC 4.33 4.00 - 5.20 x10*6/uL    Hemoglobin 14.4 12.0 - 16.0 g/dL    Hematocrit 44.3 36.0 - 46.0 %     (H) 80 - 100 fL    MCH 33.3 26.0 - 34.0 pg    MCHC 32.5 32.0 - 36.0 g/dL    RDW 14.3 11.5 - 14.5 %    Platelets 148 (L) 150 - 450 x10*3/uL    Neutrophils % 81.8 40.0 - 80.0 %    Immature Granulocytes %,  Automated 0.4 0.0 - 0.9 %    Lymphocytes % 8.1 13.0 - 44.0 %    Monocytes % 8.6 2.0 - 10.0 %    Eosinophils % 0.9 0.0 - 6.0 %    Basophils % 0.2 0.0 - 2.0 %    Neutrophils Absolute 7.36 (H) 1.60 - 5.50 x10*3/uL    Immature Granulocytes Absolute, Automated 0.04 0.00 - 0.50 x10*3/uL    Lymphocytes Absolute 0.73 (L) 0.80 - 3.00 x10*3/uL    Monocytes Absolute 0.77 0.05 - 0.80 x10*3/uL    Eosinophils Absolute 0.08 0.00 - 0.40 x10*3/uL    Basophils Absolute 0.02 0.00 - 0.10 x10*3/uL   Comprehensive Metabolic Panel   Result Value Ref Range    Glucose 182 (H) 74 - 99 mg/dL    Sodium 140 136 - 145 mmol/L    Potassium 4.4 3.5 - 5.3 mmol/L    Chloride 96 (L) 98 - 107 mmol/L    Bicarbonate 31 21 - 32 mmol/L    Anion Gap 17 10 - 20 mmol/L    Urea Nitrogen 48 (H) 6 - 23 mg/dL    Creatinine 1.38 (H) 0.50 - 1.05 mg/dL    eGFR 36 (L) >60 mL/min/1.73m*2    Calcium 9.9 8.6 - 10.3 mg/dL    Albumin 4.4 3.4 - 5.0 g/dL    Alkaline Phosphatase 102 33 - 136 U/L    Total Protein 6.8 6.4 - 8.2 g/dL    AST 26 9 - 39 U/L    Bilirubin, Total 1.3 (H) 0.0 - 1.2 mg/dL    ALT 13 7 - 45 U/L   Lactate   Result Value Ref Range    Lactate 1.4 0.4 - 2.0 mmol/L   Protime-INR   Result Value Ref Range    Protime 30.4 (H) 9.8 - 12.4 seconds    INR 2.7 (H) 0.9 - 1.1   Troponin I, High Sensitivity   Result Value Ref Range    Troponin I, High Sensitivity 18 (H) 0 - 13 ng/L   Creatine Kinase   Result Value Ref Range    Creatine Kinase 43 0 - 215 U/L   Troponin I, High Sensitivity   Result Value Ref Range    Troponin I, High Sensitivity 16 (H) 0 - 13 ng/L     Imaging  CT chest abdomen pelvis w IV contrast  Result Date: 6/28/2025  CHEST 1. Cardiomegaly with coronary artery calcification along with mild bibasilar interstitial thickening which may represent mild bibasilar edema. 2. Prominence of the main pulmonary artery. Correlate with concern for pulmonary artery hypertension.   ABDOMEN - PELVIS 1. No acute abnormalities within the abdomen or pelvis. 2. Fatty  infiltrated liver. 3. Atherosclerotic changes of the abdominal aorta with tortuosity and infrarenal aneurysm.   MACRO: None   Signed by: Belkys Núñez 6/28/2025 2:21 PM Dictation workstation:   [id number]    XR shoulder right 2+ views  Result Date: 6/28/2025  Slightly displaced distal right clavicular fracture.     Signed by: Belkys Núñez 6/28/2025 11:39 AM Dictation workstation:   [id number]    CT thoracic spine wo IV contrast  Result Date: 6/28/2025  No evidence for a fracture on this exam. Osteoporosis. Mild diffuse thoracic spondylosis. Cardiomegaly.   MACRO: none   Signed by: Mayank Almaguer 6/28/2025 11:38 AM Dictation workstation:   [id number]    CT lumbar spine wo IV contrast  Result Date: 6/28/2025  1. No fractures are identified. Bones are osteoporotic. 2. Diffuse lumbar spondylosis is present. Please see the individual disc level reports above. Spinal canal stenosis is present at L3-4 and L4-5. 3. Mild left renal atrophy. Left renal cyst. 4. Aneurysmal dilatation of the abdominal aorta with a maximum diameter of 3.5 cm. Appropriate follow-up is suggested.   MACRO: none   Signed by: Mayank Almaguer 6/28/2025 11:35 AM Dictation workstation:   [id number]    CT cervical spine wo IV contrast  Result Date: 6/28/2025  No evidence for a fracture on this exam. Cervical spondylosis as described.   MACRO: none   Signed by: Mayank Almaguer 6/28/2025 11:29 AM Dictation workstation:   [id number]    CT head W O contrast trauma protocol  Result Date: 6/28/2025  1. There is intracranial hemorrhage identified with blood products identified within the right lateral ventricular system involving the occipital horn, atrium, and body of the right lateral ventricle. 2. There is also a small amount of fluid within the right mastoid air cells, new from 2023 without evidence for an associated fracture. 3. Remainder of the findings are stable compared to 2023.       MACRO: Mayank Almaguer discussed the significance and urgency of this  critical finding by EPIC secure chat with  SARA LUIS on 6/28/2025 at 11:24 am.  (**-RCF-**) Findings:  See findings.     Signed by: Mayank Almaguer 6/28/2025 11:25 AM Dictation workstation:   [id number]      Cardiology, Vascular, and Other Imaging  No other imaging results found for the past 7 days        Assessment /Plan    Soledad Melendrez is a 89 y.o. female with PMH of T2DM, AAA, A fib, CAD, HLD, HTN, mitral/aortic/tricuspid valve regurg, hypothyroidism, and GERD who was BIBA due to a fall.ED course revealed tachypnea and tachycardia. Remaining vitals HDS. YRN. CT head revealed intracranail hemorrhage of occipital horn, atrium, and body of right lateral ventricle. XR R shoulder revealed slightly displaced distal R clavicular fracture. CT of lumbar/thoracic/cervical spine and C/A/P all revealed no further acute findings.     List of Injuries:  - ICH of occipital horn, atrium, and body of right lateral ventricle  - Slightly displaced distal R clavicular fracture    Assessment & Plan    #ICH of R lateral ventricle  - Repeat CT at 6 hours  - HOB >30  - Q2 neuro checks until repeat CT  - Q4 neuro checks after CT x24 hrs  - Maintain SBP between 100-140  - Minimize metabolic demands    > Avoid fevers/treat infection    > Avoid hyponatremia     > Maintain euglycemia    > Avoid hypoxia  - NSGY consult  - DVT ppx w/ SCDs only     #R clavicle fracture  - Maintain sling  - Consult ortho  - NPO in the event surgery is needed  - Multimodal pain management  - Trend CBC  - PT/OT    Chronic Conditions  #T2DM  #AAA  #A fib  #CAD  #HLD  #HTN  #hypothyroidism  #GERD  - ICU team on consult, appreciate recs    Dispo: Admit to the ICU under trauma team.     Wendy Lopez PA-C           [1]   Past Medical History:  Diagnosis Date    Old myocardial infarction     History of myocardial infarction    Onycholysis 12/01/2023    Personal history of other diseases of the circulatory system     History of coronary artery disease     Personal history of other diseases of the circulatory system 06/30/2020    History of hypertension    Personal history of other diseases of the circulatory system     History of hypertension    Personal history of other diseases of the musculoskeletal system and connective tissue     History of arthritis    Personal history of other diseases of the respiratory system     History of lung disease    Personal history of other endocrine, nutritional and metabolic disease     History of thyroid disorder    Personal history of other endocrine, nutritional and metabolic disease 09/01/2016    History of type 2 diabetes mellitus    Personal history of other endocrine, nutritional and metabolic disease     History of hyperlipidemia    Personal history of other endocrine, nutritional and metabolic disease     History of hypothyroidism    Unspecified cataract     Cataracts, bilateral    Unspecified hearing loss, right ear 01/12/2019    Hearing loss on right   [2]   Past Surgical History:  Procedure Laterality Date    APPENDECTOMY  09/01/2016    Appendectomy    CORONARY ARTERY BYPASS GRAFT  09/01/2016    CABG    OTHER SURGICAL HISTORY  09/01/2016    Endarterectomy Carotid Artery    TONSILLECTOMY  09/01/2016    Tonsillectomy With Adenoidectomy   [3] (Not in a hospital admission)  [4]   Social History  Tobacco Use    Smoking status: Former     Types: Cigarettes    Smokeless tobacco: Never   Substance Use Topics    Alcohol use: Never    Drug use: Never   [5]   Family History  Problem Relation Name Age of Onset    Stroke Father      Hypertension Father      Diabetes Sister      Diabetes Brother      Hypertension Brother

## 2025-06-28 NOTE — CONSULTS
"Subjective   Patient is a 89 y.o. female admitted on 6/28/2025 10:16 AM who presented to the ED after a fall.    HPI:  Soledad Melendrez is a 89 y.o. female with PMH of T2DM, AAA, Afib, CAD, HLD, HTN, mitral/aortic/tricuspid valve regurg, hypothyroidism, and GERD who was BIBA due to a fall. Patients granddaughter, Natividad, is at bedside and helps supplement and translate for the patient who primarily speaks Salvadorean. Patient reportedly was on the bedside commode early this morning when she fell to her R side onto an oxygen compressor. Patient reported no pain at that time and went back to sleep. When she woke up then she started complaining of pain. Patient states she felt weak and \"things went black\" when she fell off commode. At this time she is complaining of pain to her head and R shoulder. Denies vision changes.    Patient was admitted to trauma team with neurosurgery and ICU team on consult.     Past Medical History:  Medical History[1]    Past Surgical History:  Surgical History[2]     Family History:  Family History[3]     Social History:   reports that she has quit smoking. Her smoking use included cigarettes. She has never used smokeless tobacco. She reports that she does not drink alcohol and does not use drugs.    Scheduled Medications:   Scheduled Medications[4]     Continuous Medications:   Continuous Medications[5]     PRN Medications:   PRN Medications[6]    Review of Systems:   Review of Systems   A 10+ point ROS was completed and otherwise negative except as noted above and per HPI.    Objective   Vitals:  Most Recent:  Vitals:    06/28/25 1645   BP:    Pulse:    Resp:    Temp:    SpO2: 100%       24hr Min/Max:  Temp  Min: 36.4 °C (97.5 °F)  Max: 36.6 °C (97.9 °F)  Pulse  Min: 81  Max: 110  BP  Min: 118/72  Max: 167/80  Resp  Min: 11  Max: 61  SpO2  Min: 95 %  Max: 100 %    LDA: Peripheral IV         Vent settings: N/A      Intake/Output Summary (Last 24 hours) at 6/28/2025 1805  Last data filed " at 6/28/2025 1228  Gross per 24 hour   Intake 50 ml   Output --   Net 50 ml       Physical Exam:    Physical Exam   Constitutional: Awake and alert. Frail-appearing.  ENMT: mucous membranes moist, EOMI, conjunctivae clear  Head/Neck: normocephalic, atraumatic; supple, trachea midline  Respiratory/Thorax: patent airways, CTAB; no wheezes, rales, or rhonchi  Cardiovascular: Irregularly irregular rhythm, no murmur  Gastrointestinal: soft, nondistended, non-tender, bowel sounds appreciated  Extremities: palpable peripheral pulses, no edema or cyanosis. R shoulder tenderness on active motion.  Neurological: AO x3, no focal deficits  Psychological: appropriate mood and behavior  Skin: warm and dry    Lab/Radiology/Diagnostic Review:  Results for orders placed or performed during the hospital encounter of 06/28/25 (from the past 24 hours)   CBC and Auto Differential   Result Value Ref Range    WBC 9.0 4.4 - 11.3 x10*3/uL    nRBC 0.0 0.0 - 0.0 /100 WBCs    RBC 4.33 4.00 - 5.20 x10*6/uL    Hemoglobin 14.4 12.0 - 16.0 g/dL    Hematocrit 44.3 36.0 - 46.0 %     (H) 80 - 100 fL    MCH 33.3 26.0 - 34.0 pg    MCHC 32.5 32.0 - 36.0 g/dL    RDW 14.3 11.5 - 14.5 %    Platelets 148 (L) 150 - 450 x10*3/uL    Neutrophils % 81.8 40.0 - 80.0 %    Immature Granulocytes %, Automated 0.4 0.0 - 0.9 %    Lymphocytes % 8.1 13.0 - 44.0 %    Monocytes % 8.6 2.0 - 10.0 %    Eosinophils % 0.9 0.0 - 6.0 %    Basophils % 0.2 0.0 - 2.0 %    Neutrophils Absolute 7.36 (H) 1.60 - 5.50 x10*3/uL    Immature Granulocytes Absolute, Automated 0.04 0.00 - 0.50 x10*3/uL    Lymphocytes Absolute 0.73 (L) 0.80 - 3.00 x10*3/uL    Monocytes Absolute 0.77 0.05 - 0.80 x10*3/uL    Eosinophils Absolute 0.08 0.00 - 0.40 x10*3/uL    Basophils Absolute 0.02 0.00 - 0.10 x10*3/uL   Comprehensive Metabolic Panel   Result Value Ref Range    Glucose 182 (H) 74 - 99 mg/dL    Sodium 140 136 - 145 mmol/L    Potassium 4.4 3.5 - 5.3 mmol/L    Chloride 96 (L) 98 - 107 mmol/L     Bicarbonate 31 21 - 32 mmol/L    Anion Gap 17 10 - 20 mmol/L    Urea Nitrogen 48 (H) 6 - 23 mg/dL    Creatinine 1.38 (H) 0.50 - 1.05 mg/dL    eGFR 36 (L) >60 mL/min/1.73m*2    Calcium 9.9 8.6 - 10.3 mg/dL    Albumin 4.4 3.4 - 5.0 g/dL    Alkaline Phosphatase 102 33 - 136 U/L    Total Protein 6.8 6.4 - 8.2 g/dL    AST 26 9 - 39 U/L    Bilirubin, Total 1.3 (H) 0.0 - 1.2 mg/dL    ALT 13 7 - 45 U/L   Lactate   Result Value Ref Range    Lactate 1.4 0.4 - 2.0 mmol/L   Protime-INR   Result Value Ref Range    Protime 30.4 (H) 9.8 - 12.4 seconds    INR 2.7 (H) 0.9 - 1.1   Troponin I, High Sensitivity   Result Value Ref Range    Troponin I, High Sensitivity 18 (H) 0 - 13 ng/L   Creatine Kinase   Result Value Ref Range    Creatine Kinase 43 0 - 215 U/L   Troponin I, High Sensitivity   Result Value Ref Range    Troponin I, High Sensitivity 16 (H) 0 - 13 ng/L       All other labs and Imaging have been personally reviewed.       Assessment/Plan       Soledad Melendrez is a 89-year-old female who presented to [facility] on 6/28/2025 after a fall. Found to have R ventricular ICH. Admitted to ICU under trauma team. Neurosurgery, orthopedics, and ICU on consult.    Neuro:  #Intracranial hemorrhage of right lateral ventricle  -Repeat CT at 6 and 24 hours  -q2h and q4h neurochecks respectively for 6-hour and 24-hour repeat Cts  -Neurosurgery on consult  -Baseline mentation AO x3, will reassess   -Avoid excessive caths/ lines, monitor for ICU delirium    Cardiovascular  #Afib  #AAA  #CAD  #HLD  #HTN  #Mitral/aortic/tricuspid regurgitation  -Last echo: 9/27/2024, LVEF 50-55%, mild concentric LVH, severe LA dilation, severe RV enlargement, moderately reduced right ventricular systolic function.  - Continue statin, Lopressor, and Aldactone.  -Maintain MAPs>65    Pulmonary:  #Respiratory insufficiency  -Oxygen as needed, currently on 4L NC    GI:  #GERD  -NPO for now, advance per surgery team  -IV protonix 40mg daily ordered    Renal:    #YRN on CKD  -Cr 1.38, last measurement was 0.82 January 2024.  -Continue NS 100mL/hr.  -Trend BMP, monitor UOP, optimize electrolytes       Endocrine:  #T2DM  #Hyperglycemia  #Hypothyroidism  -Will start on SSI 1 lispro NPO with hypoglycemia protocol, escalate as needed for sufficient glucose control  -Accu-checks q4h  - Continue levothyroxine 50 mcg daily.    Heme/Onc:  #Elevated INR 2/2 coumadin use  #Macrocytosis  -Given ICH, will order 1 unit of FFP to be administered. Patient also received 10mg vitamin K  -Patient is not anemic at this time, but has MCV of 102 noted on CBC.  -monitor CBC    ID:  -Monitor for signs of infection       Skin/MSK:  #Right clavicle fracture  -Maintain sling, orthopedic surgery on consult. Patient is receiving multimodal pain management per surgery team.  - Lidocaine patch ordered for R shoulder.    ICU CHECK LIST:   Antimicrobials: -  Oxygen: 4L O2  Feeding: NPO  Fluids: NS 100mL/hr  Analgesia: Tylenol RABIA, dilaudid PRN  Sedation: -  Thromboprophylaxis: SCD  Ulcer prophylaxis: -  Glycemic control: SSI 2 lispro NPO with hypoglycemia protocol  Bowel care: PRN  Indwelling catheters: -  Lines: Peripheral IV    Code Status: DNR/DNI    This is a preliminary note written by the resident. Please wait for attending addendum for finalization of note and recommendations.    Isaias Carney MD  PGY-1         [1]   Past Medical History:  Diagnosis Date    Old myocardial infarction     History of myocardial infarction    Onycholysis 12/01/2023    Personal history of other diseases of the circulatory system     History of coronary artery disease    Personal history of other diseases of the circulatory system 06/30/2020    History of hypertension    Personal history of other diseases of the circulatory system     History of hypertension    Personal history of other diseases of the musculoskeletal system and connective tissue     History of arthritis    Personal history of other diseases of the  respiratory system     History of lung disease    Personal history of other endocrine, nutritional and metabolic disease     History of thyroid disorder    Personal history of other endocrine, nutritional and metabolic disease 09/01/2016    History of type 2 diabetes mellitus    Personal history of other endocrine, nutritional and metabolic disease     History of hyperlipidemia    Personal history of other endocrine, nutritional and metabolic disease     History of hypothyroidism    Unspecified cataract     Cataracts, bilateral    Unspecified hearing loss, right ear 01/12/2019    Hearing loss on right   [2]   Past Surgical History:  Procedure Laterality Date    APPENDECTOMY  09/01/2016    Appendectomy    CORONARY ARTERY BYPASS GRAFT  09/01/2016    CABG    OTHER SURGICAL HISTORY  09/01/2016    Endarterectomy Carotid Artery    TONSILLECTOMY  09/01/2016    Tonsillectomy With Adenoidectomy   [3]   Family History  Problem Relation Name Age of Onset    Stroke Father      Hypertension Father      Diabetes Sister      Diabetes Brother      Hypertension Brother     [4] [Held by provider] atorvastatin, 20 mg, oral, Daily  [Held by provider] escitalopram, 5 mg, oral, Daily  [Held by provider] levothyroxine, 50 mcg, oral, Daily  lidocaine, 1 patch, transdermal, Daily  [Held by provider] metoprolol tartrate, 25 mg, oral, BID  oxygen, , inhalation, Continuous - Inhalation  [Held by provider] spironolactone, 25 mg, oral, Daily  [5] niCARdipine, 0-15 mg/hr  sodium chloride 0.9%, 100 mL/hr  [6] PRN medications: albuterol, hydrALAZINE, HYDROmorphone, HYDROmorphone, labetaloL, naloxone, niCARdipine

## 2025-06-28 NOTE — TREATMENT PLAN
Patient seen and examined. Chart reviewed where relevant. Discussed findings and clinical plan with note author. Modifications or addendum made only as necessary, and agree with finalized documentation.    Speaks Japanese.  Intracranial hemorrhage within the right lateral ventricular system involving the occipital horn, atrium, and body of the right lateral ventricle. NSGY on consult. 6 hour CT for now.  Other nonacute findings: Discspace narrowing with anterior and posterior marginal spurring at C5-6. Facet spurring on the left at C2-3 and C4-5. Facet spurring on the right at C3-4. Cervical scoliosis with convexity to the right. Diffuse lumbar spondylosis is present. Spinal canal stenosis is present at L3-4 and L4-5. Aneurysmal dilatation of the abdominal aorta with a maximum diameter of 3.5 cm.   Admitted to Trauma Service in ICU, with NSGY on consult. Anticipate FFP. Discussed with intensivist.    Timbo Ibarra MD, PhD  Available via [employer]

## 2025-06-28 NOTE — ED PROVIDER NOTES
Emergency Department Provider Note        History of Present Illness     History provided by: Patient, Family Member, and EMS  Limitations to History: None    HPI:  Soledad Melendrez is a 89 y.o. female With past medical history of aortic valve insufficiency, heart failure, CVA, carotid endarterectomy, diabetes, mitral valve regurgitation, atrial fibrillation, taking Coumadin, presents emergency department today due to a fall.  Patient's family ember states she fell out of bed onto her right side.  Patient states that she did strike her head.  Given that she is on anticoagulation we did initiate a limited trauma activation.  Patient is also complaining of pain in her right shoulder and her back.  She denies any loss of consciousness.  She is alert and oriented x 3.    Physical Exam   Triage vitals:  T 36.6 °C (97.9 °F)  HR 88  /80  RR 17  O2 99 % Supplemental oxygen    Physical Exam  Vitals and nursing note reviewed.   Constitutional:       General: She is not in acute distress.     Appearance: Normal appearance. She is not toxic-appearing or diaphoretic.      Interventions: Cervical collar in place.   HENT:      Head: Normocephalic and atraumatic.      Comments: No palpable hematoma, calvarium nontender to palpation, no tenderness palpation of the face, no midface instability, no malocclusion, no fractured teeth, no intraoral bleeding or trauma     Right Ear: Tympanic membrane normal. No hemotympanum.      Left Ear: Tympanic membrane normal. No hemotympanum.      Nose: Nose normal. No nasal tenderness.      Right Nostril: No epistaxis or septal hematoma.      Left Nostril: No epistaxis or septal hematoma.      Comments: No bleeding from bilateral naris, no tenderness palpation     Mouth/Throat:      Mouth: Mucous membranes are moist. No injury.      Pharynx: Oropharynx is clear. No oropharyngeal exudate or posterior oropharyngeal erythema.   Eyes:      General: Lids are normal. Vision grossly  intact.      Extraocular Movements: Extraocular movements intact.      Right eye: Normal extraocular motion.      Left eye: Normal extraocular motion.      Conjunctiva/sclera: Conjunctivae normal.      Right eye: No hemorrhage.     Left eye: No hemorrhage.     Pupils: Pupils are equal, round, and reactive to light.   Cardiovascular:      Rate and Rhythm: Normal rate and regular rhythm.      Pulses:           Radial pulses are 2+ on the right side and 2+ on the left side.        Femoral pulses are 2+ on the right side and 2+ on the left side.       Dorsalis pedis pulses are 2+ on the right side and 2+ on the left side.        Posterior tibial pulses are 2+ on the right side and 2+ on the left side.   Pulmonary:      Effort: No respiratory distress.      Breath sounds: Normal breath sounds.   Abdominal:      General: Abdomen is flat.      Tenderness: There is no abdominal tenderness. There is no guarding or rebound.   Musculoskeletal:         General: No swelling, tenderness, deformity or signs of injury.      Right shoulder: Normal. No deformity or tenderness.      Left shoulder: Normal. No deformity or tenderness.      Right upper arm: Normal. No deformity or tenderness.      Left upper arm: Normal. No deformity or tenderness.      Right elbow: Normal. No deformity. No tenderness.      Left elbow: Normal. No deformity. No tenderness.      Right forearm: Normal. No deformity or tenderness.      Left forearm: Normal. No deformity or tenderness.      Right wrist: Normal. No deformity or tenderness.      Left wrist: Normal. No deformity or tenderness.      Right hand: Normal. No deformity or tenderness.      Left hand: Normal. No deformity or tenderness.      Cervical back: No deformity, tenderness or bony tenderness. No spinous process tenderness.      Thoracic back: No deformity or bony tenderness.      Lumbar back: No deformity or bony tenderness.      Right hip: No deformity or tenderness.      Left hip: No  deformity or tenderness.      Right upper leg: No deformity or tenderness.      Left upper leg: No deformity or tenderness.      Right knee: No deformity. No tenderness.      Left knee: No deformity. No tenderness.      Right lower leg: No deformity or tenderness.      Left lower leg: No deformity or tenderness.      Right ankle: No deformity. No tenderness.      Left ankle: No deformity. No tenderness.      Right foot: No deformity or tenderness.      Left foot: No deformity or tenderness.      Comments: Diffuse C,T and L-spine tenderness, no step-offs or deformities.  No tenderness to palpation abrasion or evidence of injury left upper or bilateral lower extremities   Skin:     General: Skin is warm.      Capillary Refill: Capillary refill takes less than 2 seconds.      Findings: No abrasion, bruising, laceration or lesion.   Neurological:      General: No focal deficit present.      Mental Status: She is alert and oriented to person, place, and time.      GCS: GCS eye subscore is 4. GCS verbal subscore is 5. GCS motor subscore is 6.      Sensory: Sensation is intact.      Comments: GCS 15   Psychiatric:         Mood and Affect: Mood normal.         Behavior: Behavior normal.          Medical Decision Making & ED Course   Medical Decision Makin y.o. female With past medical history of aortic valve insufficiency, heart failure, CVA, carotid endarterectomy, diabetes, mitral valve regurgitation, atrial fibrillation, taking Coumadin, presents emergency department today due to a fall.  Patient's family ember states she fell out of bed onto her right side.  Patient states that she did strike her head.  Given that she is on anticoagulation we did initiate a limited trauma activation.  Patient is also complaining of pain in her right shoulder and her back.  She denies any loss of consciousness.  She is alert and oriented x 3.  Given patient's mechanism and locations of pain I did obtain CT imaging of her head,  spine, and x-ray imaging of her right shoulder.  Also obtained INR as she is on Coumadin and basic trauma labs as well as metabolic workup given her history of atrial fibrillation and fall.  I was called by radiology due to a concern for a intraventricular bleed.  I did reach out to neurosurgery Dr. Momin who recommended that we give her IV vitamin K, he did not recommend other reversal agents or Keppra for possible seizure prophylaxis.  Patient's lab work showed no leukocytosis, anemia, and slightly low platelets.  CK was not elevated, unlikely prolonged downtime.  Initial troponin was slightly elevated, repeat was downtrending.  Patient's PT/INR was elevated consistent with her Coumadin use.  Patient's lactate was not elevated.  CMP showed no significant electrolyte abnormality or liver pathology.  Patient's BUN and creatinine are slightly elevated over baseline.  Patient was given 500 cc bolus to treat this.  Patient was also initiated on the intracranial hemorrhage protocol including 2-hour neurochecks, close blood pressure monitoring with a goal SBP of 140 or less.  I did speak with trauma surgery, Dr. Ibarra, he did want me to obtain a CT of the chest abdomen pelvis.  I did obtain these images, there is no evidence of traumatic finding on these, there are multiple incidental findings.  Patient's x-ray imaging did reveal a clavicle fracture on the right.  A sling was placed, see procedure note.  These will be followed by the inpatient team.  I did reach out to the ICU attending Dr. Villalobos who agreed to accept the patient to their department under the trauma service.  Patient was admitted to the ICU in stable condition.  She did require some push dose of IV hydralazine for her blood pressure.  ----   Differential diagnoses considered include but are not limited to: Intracranial hemorrhage, intracranial injury, calvarial fracture, spinal injury, blunt trauma injury to the chest, blunt trauma injury to the  pelvis, trauma blunt trauma injuries to the abdomen.  Clavicle fracture, shoulder fracture.     Social Determinants of Health which Significantly Impact Care: None identified     EKG Independent Interpretation: EKG interpreted by myself. Please see ED Course for full interpretation.    Independent Result Review and Interpretation: Relevant laboratory and radiographic results were reviewed and independently interpreted by myself.  As necessary, they are commented on in the ED Course.    Chronic conditions affecting the patient's care: As documented above in [facility]    The patient was discussed with the following consultants/services: Dr. Ibarra, Surgery, Dr. Momin, neurosurgery and  Dr. Villalobos ICU attending who accepted the patient for admission admission for intraventricular hemorrhage    Care Considerations: As documented above in [facility]    ED Course:  ED Course as of 06/28/25 1527   Sat Jun 28, 2025   1128 CT head W O contrast trauma protocol  1. There is intracranial hemorrhage identified with blood products  identified within the right lateral ventricular system involving the  occipital horn, atrium, and body of the right lateral ventricle.  2. There is also a small amount of fluid within the right mastoid air  cells, new from 2023 without evidence for an associated fracture.  3. Remainder of the findings are stable compared to 2023.   [WS]   1129 Protime-INR(!)  Elevated, given patient's intracranial bleed, will initiate vitamin K until we speak with neurosurgery.  Will see if they want full reversal with Kcentra. [WS]   1129 CBC and Auto Differential(!)  No leukocytosis, no anemia, platelets mildly low. [WS]   1140 Lactate  Normal [WS]   1140 CT cervical spine wo IV contrast  No evidence for a fracture on this exam. Cervical spondylosis as described.   [WS]   1140 CT lumbar spine wo IV contrast  1. No fractures are identified. Bones are osteoporotic.  2. Diffuse lumbar spondylosis is present. Please see the  individual disc level reports above. Spinal canal stenosis is present at L3-4  and L4-5.  3. Mild left renal atrophy. Left renal cyst.  4. Aneurysmal dilatation of the abdominal aorta with a maximum diameter of 3.5 cm.   [WS]   1141 CT thoracic spine wo IV contrast  No evidence for a fracture on this exam.  Osteoporosis.  Mild diffuse thoracic spondylosis.  Cardiomegaly.   [WS]   1143 XR shoulder right 2+ views  Slightly displaced distal right clavicular fracture. [WS]   1153 Patient C-spine imaging did not show evidence of traumatic injury.  Patient C-spine was clinically cleared.  Patient was able to flex, extend, laterally bend to both sides, and rotate without midline C-spine tenderness. [WS]   1155 Creatine Kinase  Not elevated [WS]   1155 Comprehensive Metabolic Panel(!)  No significant electrolyte abnormalities or liver pathology.  BUN and creatinine are slightly elevated patiently given IV fluids as we are obtaining CT imaging with IV contrast. [WS]   1155 Troponin I, High Sensitivity(!)  Mildly elevated, similar to baseline, will be repeated [WS]   1430 CT chest abdomen pelvis w IV contrast  CHEST  1. Cardiomegaly with coronary artery calcification along with mild  bibasilar interstitial thickening which may represent mild bibasilar  edema.  2. Prominence of the main pulmonary artery. Correlate with concern  for pulmonary artery hypertension.      ABDOMEN - PELVIS  1. No acute abnormalities within the abdomen or pelvis.  2. Fatty infiltrated liver.  3. Atherosclerotic changes of the abdominal aorta with tortuosity and  infrarenal aneurysm.   [WS]   1503 Troponin I, High Sensitivity(!)  Downtrending [WS]      ED Course User Index  [WS] Jhonatan Mckinley, APRN-CNP         Diagnoses as of 06/28/25 1527   Intracranial bleed (Multi)   Fall, initial encounter   Anticoagulated on Coumadin   Closed displaced fracture of acromial end of right clavicle, initial encounter     Disposition   As a result of their workup,  the patient will require admission to the hospital.  The patient was informed of her diagnosis.  The patient was given the opportunity to ask questions and I answered them. The patient agreed to be admitted to the hospital.    Procedures   Splint Application    Performed by: HERNANDEZ De La Cruz  Authorized by: Tung Cueva DO    Consent:     Consent obtained:  Verbal    Consent given by:  Patient    Risks, benefits, and alternatives were discussed: yes      Risks discussed:  Discoloration, numbness, pain and swelling    Alternatives discussed:  No treatment, delayed treatment, alternative treatment, observation and referral  Universal protocol:     Procedure explained and questions answered to patient or proxy's satisfaction: yes      Relevant documents present and verified: yes      Test results available: yes      Imaging studies available: yes      Required blood products, implants, devices, and special equipment available: yes      Site/side marked: yes      Immediately prior to procedure a time out was called: yes      Patient identity confirmed:  Verbally with patient and arm band  Pre-procedure details:     Distal neurologic exam:  Normal    Distal perfusion: distal pulses strong and brisk capillary refill    Procedure details:     Location:  Shoulder    Shoulder location:  L shoulder    Upper extremity splint type: Sling.    Supplies:  Sling    Splint applied by::  Nursing staff Marisa, RN    Supervision: I personally supervised and inspected the splint/strap which was applied. The extremity is appropriately immobilized. Patient neurovascularly intact before and after the splint application.    Post-procedure details:     Distal neurologic exam:  Normal    Distal perfusion: distal pulses strong and brisk capillary refill      Procedure completion:  Tolerated well, no immediate complications    Post-procedure imaging: not applicable    Critical Care    Performed by: Jhonatan Mckinley  APRN-CNP  Authorized by: Tung Cueva DO    Critical care provider statement:     Critical care time (minutes):  45    Critical care time was exclusive of:  Separately billable procedures and treating other patients    Critical care was necessary to treat or prevent imminent or life-threatening deterioration of the following conditions:  Trauma (Mechanical trauma with intracranial bleed, requiring IV push medications for blood pressure, intracranial hemorrhage)    Critical care was time spent personally by me on the following activities:  Blood draw for specimens, development of treatment plan with patient or surrogate, evaluation of patient's response to treatment, examination of patient, obtaining history from patient or surrogate, ordering and performing treatments and interventions, ordering and review of laboratory studies, ordering and review of radiographic studies, pulse oximetry, re-evaluation of patient's condition, review of old charts and discussions with consultants    I assumed direction of critical care for this patient from another provider in my specialty: yes      Care discussed with: admitting provider        This was a shared visit with an ED attending.  The patient was seen and discussed with the ED attending    HERNANDEZ De La Cruz  Emergency Medicine     HERNANDEZ De La Cruz  06/28/25 [id number]

## 2025-06-29 ENCOUNTER — APPOINTMENT (OUTPATIENT)
Dept: RADIOLOGY | Facility: HOSPITAL | Age: OVER 89
End: 2025-06-29
Payer: COMMERCIAL

## 2025-06-29 LAB
ANION GAP SERPL CALC-SCNC: 17 MMOL/L (ref 10–20)
BLOOD EXPIRATION DATE: NORMAL
BUN SERPL-MCNC: 44 MG/DL (ref 6–23)
CALCIUM SERPL-MCNC: 9.3 MG/DL (ref 8.6–10.3)
CHLORIDE SERPL-SCNC: 100 MMOL/L (ref 98–107)
CO2 SERPL-SCNC: 29 MMOL/L (ref 21–32)
CREAT SERPL-MCNC: 1.27 MG/DL (ref 0.5–1.05)
DISPENSE STATUS: NORMAL
EGFRCR SERPLBLD CKD-EPI 2021: 40 ML/MIN/1.73M*2
ERYTHROCYTE [DISTWIDTH] IN BLOOD BY AUTOMATED COUNT: 14.7 % (ref 11.5–14.5)
GLUCOSE BLD MANUAL STRIP-MCNC: 106 MG/DL (ref 74–99)
GLUCOSE BLD MANUAL STRIP-MCNC: 125 MG/DL (ref 74–99)
GLUCOSE BLD MANUAL STRIP-MCNC: 158 MG/DL (ref 74–99)
GLUCOSE BLD MANUAL STRIP-MCNC: 190 MG/DL (ref 74–99)
GLUCOSE BLD MANUAL STRIP-MCNC: 316 MG/DL (ref 74–99)
GLUCOSE SERPL-MCNC: 103 MG/DL (ref 74–99)
HCT VFR BLD AUTO: 45.6 % (ref 36–46)
HGB BLD-MCNC: 14.3 G/DL (ref 12–16)
INR PPP: 1.6 (ref 0.9–1.1)
MCH RBC QN AUTO: 33.8 PG (ref 26–34)
MCHC RBC AUTO-ENTMCNC: 31.4 G/DL (ref 32–36)
MCV RBC AUTO: 108 FL (ref 80–100)
NRBC BLD-RTO: 0 /100 WBCS (ref 0–0)
PLATELET # BLD AUTO: 130 X10*3/UL (ref 150–450)
POTASSIUM SERPL-SCNC: 3.6 MMOL/L (ref 3.5–5.3)
PRODUCT BLOOD TYPE: 5100
PRODUCT CODE: NORMAL
PROTHROMBIN TIME: 17.2 SECONDS (ref 9.8–12.4)
RBC # BLD AUTO: 4.23 X10*6/UL (ref 4–5.2)
SODIUM SERPL-SCNC: 142 MMOL/L (ref 136–145)
UNIT ABO: NORMAL
UNIT NUMBER: NORMAL
UNIT RH: NORMAL
UNIT VOLUME: 212
WBC # BLD AUTO: 9 X10*3/UL (ref 4.4–11.3)

## 2025-06-29 PROCEDURE — 99233 SBSQ HOSP IP/OBS HIGH 50: CPT

## 2025-06-29 PROCEDURE — 36415 COLL VENOUS BLD VENIPUNCTURE: CPT

## 2025-06-29 PROCEDURE — 82947 ASSAY GLUCOSE BLOOD QUANT: CPT

## 2025-06-29 PROCEDURE — 2500000002 HC RX 250 W HCPCS SELF ADMINISTERED DRUGS (ALT 637 FOR MEDICARE OP, ALT 636 FOR OP/ED)

## 2025-06-29 PROCEDURE — 2500000005 HC RX 250 GENERAL PHARMACY W/O HCPCS

## 2025-06-29 PROCEDURE — 2500000004 HC RX 250 GENERAL PHARMACY W/ HCPCS (ALT 636 FOR OP/ED): Performed by: INTERNAL MEDICINE

## 2025-06-29 PROCEDURE — 2500000004 HC RX 250 GENERAL PHARMACY W/ HCPCS (ALT 636 FOR OP/ED)

## 2025-06-29 PROCEDURE — 85610 PROTHROMBIN TIME: CPT

## 2025-06-29 PROCEDURE — 2500000005 HC RX 250 GENERAL PHARMACY W/O HCPCS: Performed by: EMERGENCY MEDICINE

## 2025-06-29 PROCEDURE — 85027 COMPLETE CBC AUTOMATED: CPT

## 2025-06-29 PROCEDURE — 2500000001 HC RX 250 WO HCPCS SELF ADMINISTERED DRUGS (ALT 637 FOR MEDICARE OP)

## 2025-06-29 PROCEDURE — 99221 1ST HOSP IP/OBS SF/LOW 40: CPT | Performed by: NEUROLOGICAL SURGERY

## 2025-06-29 PROCEDURE — 99232 SBSQ HOSP IP/OBS MODERATE 35: CPT

## 2025-06-29 PROCEDURE — 1100000001 HC PRIVATE ROOM DAILY

## 2025-06-29 PROCEDURE — 80048 BASIC METABOLIC PNL TOTAL CA: CPT

## 2025-06-29 RX ORDER — INSULIN LISPRO 100 [IU]/ML
0-5 INJECTION, SOLUTION INTRAVENOUS; SUBCUTANEOUS
Status: DISCONTINUED | OUTPATIENT
Start: 2025-06-29 | End: 2025-07-01 | Stop reason: HOSPADM

## 2025-06-29 RX ADMIN — LIDOCAINE 1 PATCH: 4 PATCH TOPICAL at 08:23

## 2025-06-29 RX ADMIN — Medication 3 L/MIN: at 20:05

## 2025-06-29 RX ADMIN — SPIRONOLACTONE 25 MG: 25 TABLET, FILM COATED ORAL at 08:23

## 2025-06-29 RX ADMIN — INSULIN LISPRO 4 UNITS: 100 INJECTION, SOLUTION INTRAVENOUS; SUBCUTANEOUS at 00:08

## 2025-06-29 RX ADMIN — ACETAMINOPHEN 975 MG: 325 TABLET ORAL at 20:05

## 2025-06-29 RX ADMIN — PANTOPRAZOLE SODIUM 40 MG: 40 INJECTION, POWDER, FOR SOLUTION INTRAVENOUS at 08:24

## 2025-06-29 RX ADMIN — GABAPENTIN 100 MG: 100 CAPSULE ORAL at 20:05

## 2025-06-29 RX ADMIN — METOPROLOL TARTRATE 25 MG: 25 TABLET, FILM COATED ORAL at 20:05

## 2025-06-29 RX ADMIN — METOPROLOL TARTRATE 25 MG: 25 TABLET, FILM COATED ORAL at 08:23

## 2025-06-29 RX ADMIN — ATORVASTATIN CALCIUM 20 MG: 20 TABLET, FILM COATED ORAL at 08:24

## 2025-06-29 RX ADMIN — SODIUM CHLORIDE, SODIUM LACTATE, POTASSIUM CHLORIDE, AND CALCIUM CHLORIDE 500 ML: .6; .31; .03; .02 INJECTION, SOLUTION INTRAVENOUS at 00:55

## 2025-06-29 RX ADMIN — INSULIN LISPRO 1 UNITS: 100 INJECTION, SOLUTION INTRAVENOUS; SUBCUTANEOUS at 12:06

## 2025-06-29 RX ADMIN — Medication 4 L/MIN: at 07:25

## 2025-06-29 RX ADMIN — PHYTONADIONE 2.5 MG: 10 INJECTION, EMULSION INTRAMUSCULAR; INTRAVENOUS; SUBCUTANEOUS at 12:06

## 2025-06-29 RX ADMIN — ACETAMINOPHEN 975 MG: 325 TABLET ORAL at 08:23

## 2025-06-29 RX ADMIN — ESCITALOPRAM OXALATE 5 MG: 10 TABLET ORAL at 08:23

## 2025-06-29 RX ADMIN — ACETAMINOPHEN 975 MG: 325 TABLET ORAL at 17:01

## 2025-06-29 RX ADMIN — GABAPENTIN 100 MG: 100 CAPSULE ORAL at 08:23

## 2025-06-29 RX ADMIN — LEVOTHYROXINE SODIUM 50 MCG: 0.05 TABLET ORAL at 06:20

## 2025-06-29 ASSESSMENT — PAIN SCALES - GENERAL
PAINLEVEL_OUTOF10: 0 - NO PAIN

## 2025-06-29 ASSESSMENT — PAIN - FUNCTIONAL ASSESSMENT
PAIN_FUNCTIONAL_ASSESSMENT: 0-10

## 2025-06-29 NOTE — PROGRESS NOTES
Subjective:  Patient is awake, alert, and oriented.  She is pleasant and conversant during examination this morning.  She denies any acute complaints, no neurological changes noted since admission.    Physical Exam:  GENERAL: Awake/ alert, cooperative.   HEAD:  Normocephalic, atraumatic.  EYES:  Round and reactive.  ENT:  No nasal discharge, mucous membranes moist and pink.  NECK:  Atraumatic, no meningismus.  CARDIOVASCULAR:  Regular rate and rhythm, no murmur.  RESPIRATORY:  CTAB, no active work of breathing.   ABDOMEN:  Soft, no tenderness, nondistended.  EXTREMITIES:  No peripheral edema, no calf tenderness. R shoulder sling noted.  SKIN:  Warm and dry.  NEUROLOGICAL:  Nonfocal grossly.    Remainder of objective data including imaging and laboratory findings were all reviewed.    Admission history:     Assessment and plan:      Neuro:  #Intracranial hemorrhage of right lateral ventricle  -Repeat CT 6/28 evening shows stable intraventricular hemorrhage.  Per neurosurgery, no further imaging required unless neurological changes are seen.  Patient is at her baseline at this time.  - Patient to follow-up with neurosurgery in 2 weeks with repeat head CT, continue holding Coumadin.  -q4h neurochecks  -Baseline mentation AO x3, will reassess   -Avoid excessive caths/ lines, monitor for ICU delirium     Cardiovascular  #Afib  #AAA  #CAD  #HLD  #HTN  #Mitral/aortic/tricuspid regurgitation  -Last echo: 9/27/2024, LVEF 50-55%, mild concentric LVH, severe LA dilation, severe RV enlargement, moderately reduced right ventricular systolic function.  - Continue statin, Lopressor, and Aldactone.  -Maintain MAPs>65     Pulmonary:  #Respiratory insufficiency  -Oxygen as needed, currently on 4L NC     GI:  #GERD  -NPO for now, advance per surgery team  -IV protonix 40mg daily      Renal:   #YRN on CKD  -Cr 1.27 down from 1.38  - Continue NS at 60 mL/h  -Trend BMP, monitor UOP, optimize electrolytes          Endocrine:  #T2DM  #Hyperglycemia  #Hypothyroidism  -SSI switched to mealtime, escalate as needed for glucose control.  -Accu-checks q4h  - Continue levothyroxine 50 mcg daily.     Heme/Onc:  #Elevated INR 2/2 coumadin use  #Macrocytosis  - Repeat INR is 1.6, additional 2.5 mg dose of vitamin K given.  Repeat INR with goal rate <1.3  -monitor CBC     ID:  -Monitor for signs of infection         Skin/MSK:  #Right clavicle fracture  -Maintain sling, per orthopedic surgery note surgical intervention is indicated.  Continue multimodal pain regimen.  - Lidocaine patch for R shoulder.    Psych:  -No acute issues    ICU CHECK LIST:   Antimicrobials: -  Oxygen: 4 L O2  Drips:  Fluids: NS 60 mL/h  Feeding: Carb controlled diet  Sedation/Analgesia: Tylenol, Dilaudid  Prophylaxis: -  Glycemic control: SSI 1 mealtime lispro  Bowel care: PRN   Lines/Tubes: Peripheral IV  Restraints:   Dispo: ICU     Code Status: DNR/DNI    This is a preliminary note written by the resident. Please wait for attending addendum for finalization of note and recommendations.    Isaias Carney MD  PGY-1

## 2025-06-29 NOTE — CONSULTS
Reason For Consult  Right clavicle fracture    History Of Present Illness  Soledad Melendrez is a 89 y.o. female with past medical history of aortic valve insufficiency, heart failure, CVA, carotid endarterectomy, diabetes, mitral valve regurgitation, atrial fibrillation, taking Coumadin presenting with right clavicle fracture after fall.  History is largely provided by family at bedside.  Reportedly sustained a fall 2 days ago injuring her right shoulder.  Pain appears to be well-controlled at this point at rest.  She denies any numbness tingling about the right upper extremity.     Past Medical History  She has a past medical history of Old myocardial infarction, Onycholysis (12/01/2023), Personal history of other diseases of the circulatory system, Personal history of other diseases of the circulatory system (06/30/2020), Personal history of other diseases of the circulatory system, Personal history of other diseases of the musculoskeletal system and connective tissue, Personal history of other diseases of the respiratory system, Personal history of other endocrine, nutritional and metabolic disease, Personal history of other endocrine, nutritional and metabolic disease (09/01/2016), Personal history of other endocrine, nutritional and metabolic disease, Personal history of other endocrine, nutritional and metabolic disease, Unspecified cataract, and Unspecified hearing loss, right ear (01/12/2019).    Surgical History  She has a past surgical history that includes Other surgical history (09/01/2016); Tonsillectomy (09/01/2016); Coronary artery bypass graft (09/01/2016); and Appendectomy (09/01/2016).     Social History  She reports that she has quit smoking. Her smoking use included cigarettes. She has never used smokeless tobacco. She reports that she does not drink alcohol and does not use drugs.    Family History  Family History[1]     Allergies  Influenza virus vaccines, Lisinopril, Morphine, Nsaids  "(non-steroidal anti-inflammatory drug), Pneumococcal vaccine, and Lovastatin    Review of Systems  As above     Physical Exam  Right upper extremity:  There is a moderate amount of bruising and ecchymosis over the superior shoulder and clavicular region without open wounds or lacerations.  There is no skin tenting.  There is very mild tenderness palpation over the distal clavicle.  She does tolerate gentle range of motion about the right shoulder with minimal discomfort.  She is able to actively range her elbow and wrist without much pain.  She is grossly motor neurovascular intact distally in the right upper extremity.     Last Recorded Vitals  Blood pressure 130/70, pulse 63, temperature 36 °C (96.8 °F), temperature source Temporal, resp. rate 12, height 1.6 m (5' 3\"), weight 52.7 kg (116 lb 2.9 oz), SpO2 100%.    Relevant Results  Results for orders placed or performed during the hospital encounter of 06/28/25 (from the past 24 hours)   CBC and Auto Differential   Result Value Ref Range    WBC 9.0 4.4 - 11.3 x10*3/uL    nRBC 0.0 0.0 - 0.0 /100 WBCs    RBC 4.33 4.00 - 5.20 x10*6/uL    Hemoglobin 14.4 12.0 - 16.0 g/dL    Hematocrit 44.3 36.0 - 46.0 %     (H) 80 - 100 fL    MCH 33.3 26.0 - 34.0 pg    MCHC 32.5 32.0 - 36.0 g/dL    RDW 14.3 11.5 - 14.5 %    Platelets 148 (L) 150 - 450 x10*3/uL    Neutrophils % 81.8 40.0 - 80.0 %    Immature Granulocytes %, Automated 0.4 0.0 - 0.9 %    Lymphocytes % 8.1 13.0 - 44.0 %    Monocytes % 8.6 2.0 - 10.0 %    Eosinophils % 0.9 0.0 - 6.0 %    Basophils % 0.2 0.0 - 2.0 %    Neutrophils Absolute 7.36 (H) 1.60 - 5.50 x10*3/uL    Immature Granulocytes Absolute, Automated 0.04 0.00 - 0.50 x10*3/uL    Lymphocytes Absolute 0.73 (L) 0.80 - 3.00 x10*3/uL    Monocytes Absolute 0.77 0.05 - 0.80 x10*3/uL    Eosinophils Absolute 0.08 0.00 - 0.40 x10*3/uL    Basophils Absolute 0.02 0.00 - 0.10 x10*3/uL   Comprehensive Metabolic Panel   Result Value Ref Range    Glucose 182 (H) 74 - 99 " mg/dL    Sodium 140 136 - 145 mmol/L    Potassium 4.4 3.5 - 5.3 mmol/L    Chloride 96 (L) 98 - 107 mmol/L    Bicarbonate 31 21 - 32 mmol/L    Anion Gap 17 10 - 20 mmol/L    Urea Nitrogen 48 (H) 6 - 23 mg/dL    Creatinine 1.38 (H) 0.50 - 1.05 mg/dL    eGFR 36 (L) >60 mL/min/1.73m*2    Calcium 9.9 8.6 - 10.3 mg/dL    Albumin 4.4 3.4 - 5.0 g/dL    Alkaline Phosphatase 102 33 - 136 U/L    Total Protein 6.8 6.4 - 8.2 g/dL    AST 26 9 - 39 U/L    Bilirubin, Total 1.3 (H) 0.0 - 1.2 mg/dL    ALT 13 7 - 45 U/L   Lactate   Result Value Ref Range    Lactate 1.4 0.4 - 2.0 mmol/L   Protime-INR   Result Value Ref Range    Protime 30.4 (H) 9.8 - 12.4 seconds    INR 2.7 (H) 0.9 - 1.1   Troponin I, High Sensitivity   Result Value Ref Range    Troponin I, High Sensitivity 18 (H) 0 - 13 ng/L   Creatine Kinase   Result Value Ref Range    Creatine Kinase 43 0 - 215 U/L   Troponin I, High Sensitivity   Result Value Ref Range    Troponin I, High Sensitivity 16 (H) 0 - 13 ng/L   Prepare Plasma: 1 Units   Result Value Ref Range    PRODUCT CODE Z2915Y00     Unit Number [id number]     Unit ABO O     Unit RH POS     Dispense Status TR     Blood Expiration Date 7/3/2025  8:26:00 PM EDT     PRODUCT BLOOD TYPE 5100     UNIT VOLUME 212    Type and screen   Result Value Ref Range    ABO TYPE O     Rh TYPE POS     ANTIBODY SCREEN NEG    POCT GLUCOSE   Result Value Ref Range    POCT Glucose 149 (H) 74 - 99 mg/dL   Protime-INR   Result Value Ref Range    Protime 19.5 (H) 9.8 - 12.4 seconds    INR 1.8 (H) 0.9 - 1.1   CBC   Result Value Ref Range    WBC 9.2 4.4 - 11.3 x10*3/uL    nRBC 0.0 0.0 - 0.0 /100 WBCs    RBC 4.22 4.00 - 5.20 x10*6/uL    Hemoglobin 14.5 12.0 - 16.0 g/dL    Hematocrit 44.3 36.0 - 46.0 %     (H) 80 - 100 fL    MCH 34.4 (H) 26.0 - 34.0 pg    MCHC 32.7 32.0 - 36.0 g/dL    RDW 14.8 (H) 11.5 - 14.5 %    Platelets 147 (L) 150 - 450 x10*3/uL   POCT GLUCOSE   Result Value Ref Range    POCT Glucose 316 (H) 74 - 99 mg/dL   POCT  GLUCOSE   Result Value Ref Range    POCT Glucose 125 (H) 74 - 99 mg/dL   CBC   Result Value Ref Range    WBC 9.0 4.4 - 11.3 x10*3/uL    nRBC 0.0 0.0 - 0.0 /100 WBCs    RBC 4.23 4.00 - 5.20 x10*6/uL    Hemoglobin 14.3 12.0 - 16.0 g/dL    Hematocrit 45.6 36.0 - 46.0 %     (H) 80 - 100 fL    MCH 33.8 26.0 - 34.0 pg    MCHC 31.4 (L) 32.0 - 36.0 g/dL    RDW 14.7 (H) 11.5 - 14.5 %    Platelets 130 (L) 150 - 450 x10*3/uL   Basic metabolic panel   Result Value Ref Range    Glucose 103 (H) 74 - 99 mg/dL    Sodium 142 136 - 145 mmol/L    Potassium 3.6 3.5 - 5.3 mmol/L    Chloride 100 98 - 107 mmol/L    Bicarbonate 29 21 - 32 mmol/L    Anion Gap 17 10 - 20 mmol/L    Urea Nitrogen 44 (H) 6 - 23 mg/dL    Creatinine 1.27 (H) 0.50 - 1.05 mg/dL    eGFR 40 (L) >60 mL/min/1.73m*2    Calcium 9.3 8.6 - 10.3 mg/dL   Protime-INR   Result Value Ref Range    Protime 17.2 (H) 9.8 - 12.4 seconds    INR 1.6 (H) 0.9 - 1.1     CT head wo IV contrast  Result Date: 6/28/2025  Interpreted By:  Kwame Scanlon, STUDY: CT HEAD WO IV CONTRAST;  6/28/2025 5:27 pm   INDICATION: Signs/Symptoms:6-hour repeat CT for intraventricular bleed.   COMPARISON: Multiple CTs of the brain most recently 06/28/2025   ACCESSION NUMBER(S): [id number]   ORDERING CLINICIAN: SARA SMITH   TECHNIQUE: Noncontrast axial CT scan of head was performed.   FINDINGS: Compared with the examination from approximately 7 hours ago, there has been slight interval increase in the intraventricular hemorrhage within the right lateral ventricle. Additionally, there is new uterus layering hemorrhage within the left lateral ventricle. No other new hemorrhage is noted.   Confluent supratentorial hypodensity, nonspecific, but likely secondary to moderate chronic microvascular ischemia. Mild generalized volume loss, with concordant ventricular enlargement.   The calvarium is unremarkable. Minimal paranasal sinus mucosal thickening. Moderate right mastoid effusions left mastoid air  cells are clear. Bilateral lens replacements.       Slight interval increase right lateral ventricle hemorrhage with new trace left lateral ventricle dependent hemorrhage. Other findings as described above.   Signed by: Kwame Scanlon 6/28/2025 6:50 PM Dictation workstation:   [id number]    CT chest abdomen pelvis w IV contrast  Result Date: 6/28/2025  Interpreted By:  Belkys Núñez, STUDY: CT CHEST ABDOMEN PELVIS W IV CONTRAST;  6/28/2025 1:44 pm   INDICATION: Signs/Symptoms:Fall, HIA, distracting injury.     COMPARISON: 10/18/2023   ACCESSION NUMBER(S): [id number]   ORDERING CLINICIAN: SARA SMITH   TECHNIQUE: CT of the chest, abdomen, and pelvis was performed. Contiguous axial images were obtained at  5 mm slice thickness through the chest, and at  3 mm through the abdomen and pelvis. Coronal and sagittal reconstructions at  3 mm slice thickness were performed.  75 ML of Omnipaque 350 was administered intravenously without immediate complication.   FINDINGS: CHEST:   LUNG/PLEURA/LARGE AIRWAYS: No air space opacity, focal consolidation, pleural effusion/hemothorax, or pneumothorax are appreciated.  There are no discrete pulmonary nodules. Subpleural areas of interstitial thickening likely chronic in nature or related to mild pulmonary edema.. Bibasilar atelectasis and/or scarring.   VESSELS: No traumatic aortic injury is appreciated within the limitations of this non-EKG gated study.  The thoracic aorta is of normal course and caliber. Aortic calcification. Main pulmonary artery is prominent. Consider pulmonary artery hypertension.. Coronary artery calcification.   HEART: Cardiomegaly. There is no pericardial effusion.   MEDIASTINUM AND RADHA: No pneumomediastinum, abnormal mediastinal fluid collection or mediastinal hematoma are appreciated.  No mediastinal, hilar or biaxillary adenopathy is present.  The esophagus is normal in course and caliber.   CHEST WALL AND LOWER NECK: No acute fracture or  dislocation of the included osseous structures are appreciated.  No suspicious osseous lesions are identified.  The thoracic wall soft tissues are within normal limits.   ABDOMEN:   LIVER: No focal perfusion abnormality of the liver is appreciated to suggest contusion or laceration. There is no subcapsular hematoma, no perihepatic fluid collection. The liver is diminished in attenuation suggesting fatty infiltration.   GALLBLADDER: The gallbladder is nondistended. Tiny gallstones. No gallbladder wall thickening.   BILE DUCTS: The intahepatic and extrahepatic bile ducts are not dilated.   PANCREAS: The pancreas appears unremarkable.   SPLEEN: No parenchymal perfusion deficit of the spleen is appreciated to suggest contusion or laceration. There is no subcapsular hematoma, no perisplenic fluid collection.   ADRENAL GLANDS: The bilateral adrenal glands are unremarkable in appearance.   KIDNEYS AND URETERS: No parenchymal perfusion deficit is appreciated in bilateral kidneys to suggest contusion or laceration. There is no subcapsular hematoma, no perinephric fluid collection.  No hydroureteronephrosis or nephroureterolithiasis is present. Asymmetrically smaller left kidney with cortical thinning. Upper pole medial left renal hypodensity likely a simple cyst.   PELVIS:   BLADDER: The urinary bladder appears within normal limits.   REPRODUCTIVE ORGANS: No definite pelvic masses.   BOWEL: The stomach is unremarkable.  The small bowel is normal in caliber without evidence of focal wall thickening or obstruction.  There is no evidence of focal wall thickening or dilatation of the large bowel.   VESSELS: Atherosclerotic changes of the abdominal aorta with aneurysmal dilatation of the infrarenal abdominal aorta measuring approximately d 33 mm in greatest diameter.   PERITONEUM/RETROPERITONEUM/LYMPH NODES: There is no evidence of intra- or retroperitoneal hematoma.  There is no free or loculated fluid collection, no free  intraperitoneal air. No abdominopelvic lymphadenopathy is present.   BONES AND ABDOMINAL WALL: No evidence of acute fracture or dislocation of the included osseous structures.  No suspicious osseous lesions are identified.  Large partially calcified superficial soft tissue mass in the subcutaneous fat of the right buttock measuring approximately 52 by 33 mm. This is similar the prior examination of 09/13/2018.       CHEST 1. Cardiomegaly with coronary artery calcification along with mild bibasilar interstitial thickening which may represent mild bibasilar edema. 2. Prominence of the main pulmonary artery. Correlate with concern for pulmonary artery hypertension.   ABDOMEN - PELVIS 1. No acute abnormalities within the abdomen or pelvis. 2. Fatty infiltrated liver. 3. Atherosclerotic changes of the abdominal aorta with tortuosity and infrarenal aneurysm.   MACRO: None   Signed by: Belkys Núñez 6/28/2025 2:21 PM Dictation workstation:   [id number]    XR shoulder right 2+ views  Result Date: 6/28/2025  Interpreted By:  Belkys Núñez, STUDY: XR SHOULDER RIGHT 2+ VIEWS; 6/28/2025 10:41 am   INDICATION: Signs/Symptoms:Right shoulder pain after fall..   COMPARISON: None.   ACCESSION NUMBER(S): [id number]   ORDERING CLINICIAN: SARA SMITH   FINDINGS: Slightly displaced distal right clavicular fracture. No dislocation.. No lytic or blastic lesions or periosteal reaction. No significant arthritic changes.       Slightly displaced distal right clavicular fracture.     Signed by: Belkys Núñez 6/28/2025 11:39 AM Dictation workstation:   [id number]    CT thoracic spine wo IV contrast  Result Date: 6/28/2025  Interpreted By:  Mayank Almaguer, STUDY: CT THORACIC SPINE WO IV CONTRAST; 6/28/2025 9:55 am   INDICATION: CLINICAL DATA: Signs/Symptoms:Fall with back pain.   COMPARISON: None.   ACCESSION NUMBER(S): [id number]   ORDERING CLINICIAN: SARA SMITH   TECHNIQUE: A helical acquisition of data was obtained with multiplanar  reconstructions performed.   One or more of the following dose reduction techniques were used: Automated exposure control Adjustment of the mA and/or kV according to patient size, and/or use of iterative reconstruction technique.   FINDINGS: No fractures or destructive lesions are identified. Bones are osteoporotic. There is marginal spurring anteriorly throughout the thoracic spine. Thoracic spinal canal is capacious. No paraspinous masses are identified.   Four-chamber cardiomegaly is present. Pulmonary parenchymal assessment is limited due to motion artifact.       No evidence for a fracture on this exam. Osteoporosis. Mild diffuse thoracic spondylosis. Cardiomegaly.   MACRO: none   Signed by: Mayank Almaguer 6/28/2025 11:38 AM Dictation workstation:   [id number]    CT lumbar spine wo IV contrast  Result Date: 6/28/2025  Interpreted By:  Mayank Almaguer, STUDY: CT LUMBAR SPINE WO IV CONTRAST; 6/28/2025 9:55 am   INDICATION: Signs/Symptoms:Fall with back pain.   COMPARISON: None   ACCESSION NUMBER(S): [id number]   ORDERING CLINICIAN: SARA SMITH   TECHNIQUE: A helical axial data set was obtained and multiplanar reconstructions performed.   One or more of the following dose reduction techniques were used: Automated exposure control Adjustment of the mA and/or kV according to patient size, and/or use of iterative reconstruction technique.   FINDINGS: Bones are diffusely osteoporotic. No acute fractures or destructive lesions are identified. There is a mild lumbar dextroscoliosis. There is disc space narrowing with vacuum phenomenon from T12 through L5.   There is aneurysmal dilatation of the abdominal aorta with a maximum diameter of 3.5 cm.   There is a 1.9 cm upper pole left renal cyst. Left kidney is slightly atrophic.     L1-L2: Anterior marginal spurring is present at L1-2. There is a minimal, 1-2 mm anterior slippage of L1 relative to L2. There is mild foraminal encroachment bilaterally from osteophyte disc  formation and facet arthropathy. Spinal canal appears capacious.   L2-L3: Spinal canal and intervertebral foramina are capacious at this level. Disc space calcification is present at L2-3. Schmorl's node is present at the superior endplate of L3.   L3-L4: There is grade 1, 2-3 mm retrolisthesis of L3 relative to L4. There is mild spinal canal stenosis secondary to the retrolisthesis of L3 along with a small osteophyte disc complex. There is also posterior encroachment upon the canal from ligamentum flavum thickening facet arthropathy. There is mild foraminal encroachment from facet arthropathy and osteophyte disc formation, slightly more marked on the left.   L4-L5: There is mild spinal canal stenosis at L4-5 secondary to anterior impingement upon the spinal canal from an osteophyte disc complex measuring on the order of 3-4 mm in AP diameter along with a minimal, 2-3 mm retrolisthesis of L4 relative to L5. Posterior encroachment upon the canal is present from facet arthropathy and ligamentum flavum thickening. There is mild foraminal encroachment bilaterally from osteophyte disc formation and facet arthropathy.   L5-S1: There is minimal encroachment upon the spinal canal at L5-S1 from a mild broad-based disc protrusion. Facet arthropathy is present bilaterally. Intervertebral foramina appear to be capacious.       1. No fractures are identified. Bones are osteoporotic. 2. Diffuse lumbar spondylosis is present. Please see the individual disc level reports above. Spinal canal stenosis is present at L3-4 and L4-5. 3. Mild left renal atrophy. Left renal cyst. 4. Aneurysmal dilatation of the abdominal aorta with a maximum diameter of 3.5 cm. Appropriate follow-up is suggested.   MACRO: none   Signed by: Mayank Almaguer 6/28/2025 11:35 AM Dictation workstation:   [id number]    CT cervical spine wo IV contrast  Result Date: 6/28/2025  Interpreted By:  Mayank Almaguer, STUDY: CT CERVICAL SPINE WO IV CONTRAST; 6/28/2025 9:55  am   INDICATION: CLINICAL DATA: Signs/Symptoms:HIA. Head injury.   COMPARISON: 10/18/2023   ACCESSION NUMBER(S): [id number]   ORDERING CLINICIAN: SARA SMITH   TECHNIQUE: A helical acquisition of data was obtained with multiplanar reconstructions performed.   One or more of the following dose reduction techniques were used: Automated exposure control Adjustment of the mA and/or kV according to patient size, and/or use of iterative reconstruction technique.   FINDINGS: No fractures or destructive lesions are identified. There is disc space narrowing with anterior and posterior marginal spurring at C5-6. There is facet spurring on the left at C2-3 and C4-5. There is facet spurring on the right at C3-4. There is a cervical scoliosis with convexity to the right.   Incidental note is made of extensive vascular calcification.       No evidence for a fracture on this exam. Cervical spondylosis as described.   MACRO: none   Signed by: Mayank Almaguer 6/28/2025 11:29 AM Dictation workstation:   [id number]    CT head W O contrast trauma protocol  Result Date: 6/28/2025  Interpreted By:  Mayank Almaguer, STUDY: CT HEAD W/O CONTRAST TRAUMA PROTOCOL; 6/28/2025 9:55 am   INDICATION: Signs/Symptoms:HIA.   COMPARISON: 10/18/2023   ACCESSION NUMBER(S): [id number]   ORDERING CLINICIAN: SARA SMITH   TECHNIQUE: A helical acquisition data was obtained.   One or more of the following dose reduction techniques were used: Automated exposure control Adjustment of the mA and/or kV according to patient size, and/or use of iterative reconstruction technique.   FINDINGS: Intracranial findings: There is intraventricular hemorrhage identified within the occipital horn of the right lateral ventricle extending into the body of the right lateral ventricle, adjacent to the choroid plexus. There is no evidence for intraparenchymal hemorrhage. No subarachnoid hemorrhage is appreciated. No mass effect is identified. There is no evidence for  hydrocephalus. No calvarial fractures are identified.   Paranasal sinuses and temporal bone findings: There is a small amount of fluid identified within the right mastoid air cells, new compared to 2023. The remainder of the visualized portions of the paranasal sinuses, mastoid air cells, and middle ear cavities are clear.   Orbital findings: The visualized portions of the orbits are unremarkable.       1. There is intracranial hemorrhage identified with blood products identified within the right lateral ventricular system involving the occipital horn, atrium, and body of the right lateral ventricle. 2. There is also a small amount of fluid within the right mastoid air cells, new from 2023 without evidence for an associated fracture. 3. Remainder of the findings are stable compared to 2023.       MACRO: Mayank Almaguer discussed the significance and urgency of this critical finding by EPIC secure chat with  SARA SMITH on 6/28/2025 at 11:24 am.  (**-RCF-**) Findings:  See findings.     Signed by: Mayank Almaguer 6/28/2025 11:25 AM Dictation workstation:   [id number]         Assessment/Plan     Right minimally displaced distal one third clavicle fracture    Radiographs were independently reviewed, she does have evidence of a very mildly displaced distal clavicle fracture.  We will plan on treating this nonoperatively.  She should maintain nonweightbearing to the right upper extremity and sling for 6 weeks.  This was discussed with family at bedside.  Will have her work with PT/OT for ambulatory modifications given that she does ambulate with a walker at baseline.  Follow-up for repeat x-rays in 3 weeks.  Thank you for this consultation.    I spent 45 minutes in the professional and overall care of this patient.      Sridhar Samuels DO         [1]   Family History  Problem Relation Name Age of Onset    Stroke Father      Hypertension Father      Diabetes Sister      Diabetes Brother      Hypertension Brother

## 2025-06-29 NOTE — CONSULTS
Neurological Surgery  Consult Note      Referral Diagnosis:   1. Intracranial bleed (Multi)        2. Fall, initial encounter        3. Anticoagulated on Coumadin        4. Closed displaced fracture of acromial end of right clavicle, initial encounter         Intracranial bleed (Multi) [I62.9]  Anticoagulated on Coumadin [Z79.01]  Fall, initial encounter [W19.XXXA]  Closed displaced fracture of acromial end of right clavicle, initial encounter [S42.031A]    Reason For Consult: Intraventricular hemorrhage    History Of Present Illness  Soledad Melendrez is a 89 y.o. female with a history of dementia, aortic aneurysm, atrial fibrillation on Coumadin, coronary artery disease, valvular disease and hypertension, who presented to the hospital yesterday following a fall early yesterday morning.  I personally reviewed and interpreted a head CT performed in emergency department, which showed hemorrhage within the right lateral ventricle.  The patient was subsequently admitted to the ICU.  She was given vitamin K and FFP.  A repeat scan yesterday evening showed interval enlargement of the hemorrhage within the right lateral ventricle.  A subsequent repeat scan late last night showed stable hemorrhage within the right lateral ventricle.  At this time, the patient denies any headache or other new neurologic symptoms.  She is at her baseline.    Past Medical History  Medical History[1]    Surgical History  Surgical History[2]    Social History  Social History[3]    Allergies  Influenza virus vaccines, Lisinopril, Morphine, Nsaids (non-steroidal anti-inflammatory drug), Pneumococcal vaccine, and Lovastatin  Prescriptions Prior to Admission[4]    Review of Systems  14/14 systems reviewed and negative other than what is listed in the history of present illness    Physical Exam  General: well developed, awake/alert/oriented x3, no distress, alert and cooperative  Head/Neck: neck Supple, no apparent injury  ENMT: mucous  "membranes moist, no apparent injury, no lesions seen  Cardiovascular: no pitting edema, no JVD  Respiratory/Thorax: Normal breath sounds with good chest expansion, thorax symmetric  Skin: warm and dry, no lesions, no rashes  Neurological/Musculoskeletal: Oriented by name and location, extraocular movements intact, moves all 4 extremities with grossly full strength, sensation grossly intact to light touch      Last Recorded Vitals  Blood pressure 116/68, pulse 59, temperature 36 °C (96.8 °F), temperature source Temporal, resp. rate 19, height 1.6 m (5' 3\"), weight 52.7 kg (116 lb 2.9 oz), SpO2 100%.    Relevant Results  Scheduled medications  Scheduled Medications[5]  Continuous medications  Continuous Medications[6]  PRN medications  PRN Medications[7]    Results for orders placed or performed during the hospital encounter of 06/28/25 (from the past 96 hours)   CBC and Auto Differential   Result Value Ref Range    WBC 9.0 4.4 - 11.3 x10*3/uL    nRBC 0.0 0.0 - 0.0 /100 WBCs    RBC 4.33 4.00 - 5.20 x10*6/uL    Hemoglobin 14.4 12.0 - 16.0 g/dL    Hematocrit 44.3 36.0 - 46.0 %     (H) 80 - 100 fL    MCH 33.3 26.0 - 34.0 pg    MCHC 32.5 32.0 - 36.0 g/dL    RDW 14.3 11.5 - 14.5 %    Platelets 148 (L) 150 - 450 x10*3/uL    Neutrophils % 81.8 40.0 - 80.0 %    Immature Granulocytes %, Automated 0.4 0.0 - 0.9 %    Lymphocytes % 8.1 13.0 - 44.0 %    Monocytes % 8.6 2.0 - 10.0 %    Eosinophils % 0.9 0.0 - 6.0 %    Basophils % 0.2 0.0 - 2.0 %    Neutrophils Absolute 7.36 (H) 1.60 - 5.50 x10*3/uL    Immature Granulocytes Absolute, Automated 0.04 0.00 - 0.50 x10*3/uL    Lymphocytes Absolute 0.73 (L) 0.80 - 3.00 x10*3/uL    Monocytes Absolute 0.77 0.05 - 0.80 x10*3/uL    Eosinophils Absolute 0.08 0.00 - 0.40 x10*3/uL    Basophils Absolute 0.02 0.00 - 0.10 x10*3/uL   Comprehensive Metabolic Panel   Result Value Ref Range    Glucose 182 (H) 74 - 99 mg/dL    Sodium 140 136 - 145 mmol/L    Potassium 4.4 3.5 - 5.3 mmol/L    " Chloride 96 (L) 98 - 107 mmol/L    Bicarbonate 31 21 - 32 mmol/L    Anion Gap 17 10 - 20 mmol/L    Urea Nitrogen 48 (H) 6 - 23 mg/dL    Creatinine 1.38 (H) 0.50 - 1.05 mg/dL    eGFR 36 (L) >60 mL/min/1.73m*2    Calcium 9.9 8.6 - 10.3 mg/dL    Albumin 4.4 3.4 - 5.0 g/dL    Alkaline Phosphatase 102 33 - 136 U/L    Total Protein 6.8 6.4 - 8.2 g/dL    AST 26 9 - 39 U/L    Bilirubin, Total 1.3 (H) 0.0 - 1.2 mg/dL    ALT 13 7 - 45 U/L   Lactate   Result Value Ref Range    Lactate 1.4 0.4 - 2.0 mmol/L   Protime-INR   Result Value Ref Range    Protime 30.4 (H) 9.8 - 12.4 seconds    INR 2.7 (H) 0.9 - 1.1   Troponin I, High Sensitivity   Result Value Ref Range    Troponin I, High Sensitivity 18 (H) 0 - 13 ng/L   Creatine Kinase   Result Value Ref Range    Creatine Kinase 43 0 - 215 U/L   Troponin I, High Sensitivity   Result Value Ref Range    Troponin I, High Sensitivity 16 (H) 0 - 13 ng/L   Prepare Plasma: 1 Units   Result Value Ref Range    PRODUCT CODE O3814T80     Unit Number [id number]     Unit ABO O     Unit RH POS     Dispense Status TR     Blood Expiration Date 7/3/2025  8:26:00 PM EDT     PRODUCT BLOOD TYPE 5100     UNIT VOLUME 212    Type and screen   Result Value Ref Range    ABO TYPE O     Rh TYPE POS     ANTIBODY SCREEN NEG    POCT GLUCOSE   Result Value Ref Range    POCT Glucose 149 (H) 74 - 99 mg/dL   Protime-INR   Result Value Ref Range    Protime 19.5 (H) 9.8 - 12.4 seconds    INR 1.8 (H) 0.9 - 1.1   CBC   Result Value Ref Range    WBC 9.2 4.4 - 11.3 x10*3/uL    nRBC 0.0 0.0 - 0.0 /100 WBCs    RBC 4.22 4.00 - 5.20 x10*6/uL    Hemoglobin 14.5 12.0 - 16.0 g/dL    Hematocrit 44.3 36.0 - 46.0 %     (H) 80 - 100 fL    MCH 34.4 (H) 26.0 - 34.0 pg    MCHC 32.7 32.0 - 36.0 g/dL    RDW 14.8 (H) 11.5 - 14.5 %    Platelets 147 (L) 150 - 450 x10*3/uL   POCT GLUCOSE   Result Value Ref Range    POCT Glucose 316 (H) 74 - 99 mg/dL   POCT GLUCOSE   Result Value Ref Range    POCT Glucose 125 (H) 74 - 99 mg/dL    CBC   Result Value Ref Range    WBC 9.0 4.4 - 11.3 x10*3/uL    nRBC 0.0 0.0 - 0.0 /100 WBCs    RBC 4.23 4.00 - 5.20 x10*6/uL    Hemoglobin 14.3 12.0 - 16.0 g/dL    Hematocrit 45.6 36.0 - 46.0 %     (H) 80 - 100 fL    MCH 33.8 26.0 - 34.0 pg    MCHC 31.4 (L) 32.0 - 36.0 g/dL    RDW 14.7 (H) 11.5 - 14.5 %    Platelets 130 (L) 150 - 450 x10*3/uL   Basic metabolic panel   Result Value Ref Range    Glucose 103 (H) 74 - 99 mg/dL    Sodium 142 136 - 145 mmol/L    Potassium 3.6 3.5 - 5.3 mmol/L    Chloride 100 98 - 107 mmol/L    Bicarbonate 29 21 - 32 mmol/L    Anion Gap 17 10 - 20 mmol/L    Urea Nitrogen 44 (H) 6 - 23 mg/dL    Creatinine 1.27 (H) 0.50 - 1.05 mg/dL    eGFR 40 (L) >60 mL/min/1.73m*2    Calcium 9.3 8.6 - 10.3 mg/dL   Protime-INR   Result Value Ref Range    Protime 17.2 (H) 9.8 - 12.4 seconds    INR 1.6 (H) 0.9 - 1.1         Intake/Output Summary (Last 24 hours) at 6/29/2025 1108  Last data filed at 6/29/2025 0900  Gross per 24 hour   Intake 1891 ml   Output 350 ml   Net 1541 ml       Imaging  See HPI    Assessment and Plan  Assessment/Plan     Assessment & Plan  Intracranial bleed (Multi)      Soledad Melendrez is a 89 y.o. female with a history of dementia, aortic aneurysm, atrial fibrillation on Coumadin, coronary artery disease, valvular disease and hypertension, who presented to the hospital yesterday following a fall early yesterday morning.  I personally reviewed and interpreted a head CT performed in emergency department, which showed hemorrhage within the right lateral ventricle.  The patient was subsequently admitted to the ICU.  She was given vitamin K and FFP.  A repeat scan yesterday evening showed interval enlargement of the hemorrhage within the right lateral ventricle.  A subsequent repeat scan late last night showed stable hemorrhage within the right lateral ventricle.  At this time, the patient denies any headache or other new neurologic symptoms.  She is at her  baseline.    Plan:  - Hold Coumadin  - No need for repeat cranial imaging during admission unless there is a change in the patient's neurologic exam  - Please have patient follow-up in neurosurgery clinic in 2 weeks with a repeat head CT at that time; if negative/improved, Coumadin can be restarted at that time         Attestation  I have reviewed all prior documentation and reviewed the electronic medical record since admission. I have personally have reviewed all advanced imaging not just the reports and used my interpretation as documented as the relevant findings. I have reviewed the risks and benefits of all treatment recommendations listed in this note with the patient and family.     Signature    Steve Momin MD PhD  Attending Neurosurgeon  [facility]   of Neurological Surgery  [facility] [employer]  Office: [phone]  Fax: [phone]               [1]   Past Medical History:  Diagnosis Date    Old myocardial infarction     History of myocardial infarction    Onycholysis 12/01/2023    Personal history of other diseases of the circulatory system     History of coronary artery disease    Personal history of other diseases of the circulatory system 06/30/2020    History of hypertension    Personal history of other diseases of the circulatory system     History of hypertension    Personal history of other diseases of the musculoskeletal system and connective tissue     History of arthritis    Personal history of other diseases of the respiratory system     History of lung disease    Personal history of other endocrine, nutritional and metabolic disease     History of thyroid disorder    Personal history of other endocrine, nutritional and metabolic disease 09/01/2016    History of type 2 diabetes mellitus    Personal history of other endocrine, nutritional and metabolic disease     History of hyperlipidemia    Personal history  of other endocrine, nutritional and metabolic disease     History of hypothyroidism    Unspecified cataract     Cataracts, bilateral    Unspecified hearing loss, right ear 01/12/2019    Hearing loss on right   [2]   Past Surgical History:  Procedure Laterality Date    APPENDECTOMY  09/01/2016    Appendectomy    CORONARY ARTERY BYPASS GRAFT  09/01/2016    CABG    OTHER SURGICAL HISTORY  09/01/2016    Endarterectomy Carotid Artery    TONSILLECTOMY  09/01/2016    Tonsillectomy With Adenoidectomy   [3]   Social History  Tobacco Use    Smoking status: Former     Types: Cigarettes    Smokeless tobacco: Never   Substance Use Topics    Alcohol use: Never    Drug use: Never   [4]   Medications Prior to Admission   Medication Sig Dispense Refill Last Dose/Taking    acetaminophen (Tylenol Arthritis Pain) 650 mg ER tablet Take 1 tablet (650 mg) by mouth once daily as needed for mild pain (1 - 3).   Unknown    albuterol 2.5 mg/0.5 mL nebulizer solution Take 0.5 mL (2.5 mg) by nebulization every 6 hours if needed for wheezing or shortness of breath.   Unknown    atorvastatin (Lipitor) 20 mg tablet Take 1 tablet (20 mg) by mouth once daily.   6/27/2025    empagliflozin (Jardiance) 10 mg tablet Take 1 tablet (10 mg) by mouth once daily.   6/27/2025    escitalopram (Lexapro) 5 mg tablet Take 1 tablet (5 mg) by mouth once daily.   6/27/2025    fluticasone (Flonase) 50 mcg/actuation nasal spray Administer 1 spray into each nostril once daily at bedtime. Shake gently. Before first use, prime pump. After use, clean tip and replace cap   6/27/2025 Bedtime    furosemide (Lasix) 20 mg tablet Take 2 tablets (40 mg) by mouth once daily in the morning AND 2 tablets (40 mg) once daily in the evening. 360 tablet 3 6/27/2025 Evening    levothyroxine (Synthroid, Levoxyl) 50 mcg tablet Take 1 tablet (50 mcg) by mouth early in the morning..   6/28/2025 Morning    metoprolol tartrate (Lopressor) 25 mg tablet Take 1 tablet (25 mg) by mouth twice a  day.   6/27/2025 Evening    omeprazole (PriLOSEC) 20 mg DR capsule Take 1 capsule (20 mg) by mouth once daily.   6/27/2025    spironolactone (Aldactone) 25 mg tablet Take 1 tablet (25 mg) by mouth once daily.   6/27/2025    warfarin (Coumadin) 2.5 mg tablet Take 1 tablet (2.5 mg) by mouth see administration instructions. Friday (6/27): 1 tablet, Saturday (6/28): 2 tablets, Sunday (6/29): 1 tablet, Monday (6/30): 2 tablets, Tuesday (7/1): 2 tablets, Wednesday (7/2): 2.5 tablets, Thursday (7/3): 1.5 tablet   6/27/2025    albuterol 90 mcg/actuation inhaler Inhale 2 puffs every 6 hours if needed for wheezing.   Unknown    nitroglycerin (Nitrostat) 0.4 mg SL tablet Place 1 tablet (0.4 mg) under the tongue every 5 minutes if needed for chest pain.   Unknown    polyethylene glycol (Glycolax, Miralax) 17 gram/dose powder Mix 17 g of powder and drink once daily as needed.   Unknown   [5] acetaminophen, 975 mg, oral, TID  atorvastatin, 20 mg, oral, Daily  escitalopram, 5 mg, oral, Daily  gabapentin, 100 mg, oral, BID  insulin lispro, 0-5 Units, subcutaneous, q4h  levothyroxine, 50 mcg, oral, Daily  lidocaine, 1 patch, transdermal, Daily  metoprolol tartrate, 25 mg, oral, BID  oxygen, , inhalation, Continuous - Inhalation  pantoprazole, 40 mg, intravenous, q AM  phytonadione, 2.5 mg, intravenous, Once  spironolactone, 25 mg, oral, Daily  [6] niCARdipine, 0-15 mg/hr  sodium chloride 0.9%, 60 mL/hr, Last Rate: 60 mL/hr (06/28/25 1929)  [7] PRN medications: albuterol, dextrose, dextrose, glucagon, glucagon, hydrALAZINE, HYDROmorphone, labetaloL, naloxone, niCARdipine

## 2025-06-29 NOTE — PROGRESS NOTES
"Soledad Melendrez is a 89 y.o. female on day 1 of admission presenting with Intracranial bleed (Multi).    Subjective   Patient sitting in bed. Only endorses pain to R shoulder and head. 6 hour repeat head CT revealed a slight increase in R ventricle hemorrhage with new trace left lateral ventricle hemorrhage. Another following CT has ordered for 6 hours after and final read is still pending, but personally reviewed and appears stable.       Objective     Physical Exam  Vitals reviewed.   Constitutional:       General: She is not in acute distress.     Appearance: Normal appearance.   HENT:      Head: Normocephalic.      Nose:      Comments: NC in nares  Eyes:      Extraocular Movements: Extraocular movements intact.   Cardiovascular:      Rate and Rhythm: Normal rate and regular rhythm.   Pulmonary:      Effort: Pulmonary effort is normal. No respiratory distress.      Comments: 3L via NC  Abdominal:      General: Bowel sounds are normal.      Palpations: Abdomen is soft.      Tenderness: There is no abdominal tenderness.   Musculoskeletal:      Comments: R shoulder tender- in sling. Nontender to R elbow or hand. Sensation intact throughout all extremities   Skin:     General: Skin is warm and dry.   Neurological:      Mental Status: She is alert. Mental status is at baseline.   Psychiatric:         Mood and Affect: Mood normal.         Behavior: Behavior normal.         Last Recorded Vitals  Blood pressure 114/60, pulse 66, temperature 36 °C (96.8 °F), temperature source Temporal, resp. rate 17, height 1.6 m (5' 3\"), weight 52.7 kg (116 lb 2.9 oz), SpO2 100%.  Intake/Output last 3 Shifts:  I/O last 3 completed shifts:  In: 1891 (35.9 mL/kg) [I.V.:631 (12 mL/kg); Blood:210; IV Piggyback:1050]  Out: - (0 mL/kg)   Weight: 52.7 kg     Relevant Results  Results for orders placed or performed during the hospital encounter of 06/28/25 (from the past 24 hours)   CBC and Auto Differential   Result Value Ref Range    " WBC 9.0 4.4 - 11.3 x10*3/uL    nRBC 0.0 0.0 - 0.0 /100 WBCs    RBC 4.33 4.00 - 5.20 x10*6/uL    Hemoglobin 14.4 12.0 - 16.0 g/dL    Hematocrit 44.3 36.0 - 46.0 %     (H) 80 - 100 fL    MCH 33.3 26.0 - 34.0 pg    MCHC 32.5 32.0 - 36.0 g/dL    RDW 14.3 11.5 - 14.5 %    Platelets 148 (L) 150 - 450 x10*3/uL    Neutrophils % 81.8 40.0 - 80.0 %    Immature Granulocytes %, Automated 0.4 0.0 - 0.9 %    Lymphocytes % 8.1 13.0 - 44.0 %    Monocytes % 8.6 2.0 - 10.0 %    Eosinophils % 0.9 0.0 - 6.0 %    Basophils % 0.2 0.0 - 2.0 %    Neutrophils Absolute 7.36 (H) 1.60 - 5.50 x10*3/uL    Immature Granulocytes Absolute, Automated 0.04 0.00 - 0.50 x10*3/uL    Lymphocytes Absolute 0.73 (L) 0.80 - 3.00 x10*3/uL    Monocytes Absolute 0.77 0.05 - 0.80 x10*3/uL    Eosinophils Absolute 0.08 0.00 - 0.40 x10*3/uL    Basophils Absolute 0.02 0.00 - 0.10 x10*3/uL   Comprehensive Metabolic Panel   Result Value Ref Range    Glucose 182 (H) 74 - 99 mg/dL    Sodium 140 136 - 145 mmol/L    Potassium 4.4 3.5 - 5.3 mmol/L    Chloride 96 (L) 98 - 107 mmol/L    Bicarbonate 31 21 - 32 mmol/L    Anion Gap 17 10 - 20 mmol/L    Urea Nitrogen 48 (H) 6 - 23 mg/dL    Creatinine 1.38 (H) 0.50 - 1.05 mg/dL    eGFR 36 (L) >60 mL/min/1.73m*2    Calcium 9.9 8.6 - 10.3 mg/dL    Albumin 4.4 3.4 - 5.0 g/dL    Alkaline Phosphatase 102 33 - 136 U/L    Total Protein 6.8 6.4 - 8.2 g/dL    AST 26 9 - 39 U/L    Bilirubin, Total 1.3 (H) 0.0 - 1.2 mg/dL    ALT 13 7 - 45 U/L   Lactate   Result Value Ref Range    Lactate 1.4 0.4 - 2.0 mmol/L   Protime-INR   Result Value Ref Range    Protime 30.4 (H) 9.8 - 12.4 seconds    INR 2.7 (H) 0.9 - 1.1   Troponin I, High Sensitivity   Result Value Ref Range    Troponin I, High Sensitivity 18 (H) 0 - 13 ng/L   Creatine Kinase   Result Value Ref Range    Creatine Kinase 43 0 - 215 U/L   Troponin I, High Sensitivity   Result Value Ref Range    Troponin I, High Sensitivity 16 (H) 0 - 13 ng/L   Prepare Plasma: 1 Units   Result  Value Ref Range    PRODUCT CODE Z9023P13     Unit Number [id number]     Unit ABO O     Unit RH POS     Dispense Status TR     Blood Expiration Date 7/3/2025  8:26:00 PM EDT     PRODUCT BLOOD TYPE 5100     UNIT VOLUME 212    Type and screen   Result Value Ref Range    ABO TYPE O     Rh TYPE POS     ANTIBODY SCREEN NEG    POCT GLUCOSE   Result Value Ref Range    POCT Glucose 149 (H) 74 - 99 mg/dL   Protime-INR   Result Value Ref Range    Protime 19.5 (H) 9.8 - 12.4 seconds    INR 1.8 (H) 0.9 - 1.1   CBC   Result Value Ref Range    WBC 9.2 4.4 - 11.3 x10*3/uL    nRBC 0.0 0.0 - 0.0 /100 WBCs    RBC 4.22 4.00 - 5.20 x10*6/uL    Hemoglobin 14.5 12.0 - 16.0 g/dL    Hematocrit 44.3 36.0 - 46.0 %     (H) 80 - 100 fL    MCH 34.4 (H) 26.0 - 34.0 pg    MCHC 32.7 32.0 - 36.0 g/dL    RDW 14.8 (H) 11.5 - 14.5 %    Platelets 147 (L) 150 - 450 x10*3/uL   POCT GLUCOSE   Result Value Ref Range    POCT Glucose 316 (H) 74 - 99 mg/dL   POCT GLUCOSE   Result Value Ref Range    POCT Glucose 125 (H) 74 - 99 mg/dL   CBC   Result Value Ref Range    WBC 9.0 4.4 - 11.3 x10*3/uL    nRBC 0.0 0.0 - 0.0 /100 WBCs    RBC 4.23 4.00 - 5.20 x10*6/uL    Hemoglobin 14.3 12.0 - 16.0 g/dL    Hematocrit 45.6 36.0 - 46.0 %     (H) 80 - 100 fL    MCH 33.8 26.0 - 34.0 pg    MCHC 31.4 (L) 32.0 - 36.0 g/dL    RDW 14.7 (H) 11.5 - 14.5 %    Platelets 130 (L) 150 - 450 x10*3/uL   Basic metabolic panel   Result Value Ref Range    Glucose 103 (H) 74 - 99 mg/dL    Sodium 142 136 - 145 mmol/L    Potassium 3.6 3.5 - 5.3 mmol/L    Chloride 100 98 - 107 mmol/L    Bicarbonate 29 21 - 32 mmol/L    Anion Gap 17 10 - 20 mmol/L    Urea Nitrogen 44 (H) 6 - 23 mg/dL    Creatinine 1.27 (H) 0.50 - 1.05 mg/dL    eGFR 40 (L) >60 mL/min/1.73m*2    Calcium 9.3 8.6 - 10.3 mg/dL     CT head wo IV contrast  Result Date: 6/28/2025  Interpreted By:  Kwame Scanlon, STUDY: CT HEAD WO IV CONTRAST;  6/28/2025 5:27 pm   INDICATION: Signs/Symptoms:6-hour repeat CT for  intraventricular bleed.   COMPARISON: Multiple CTs of the brain most recently 06/28/2025   ACCESSION NUMBER(S): [id number]   ORDERING CLINICIAN: SARA SMITH   TECHNIQUE: Noncontrast axial CT scan of head was performed.   FINDINGS: Compared with the examination from approximately 7 hours ago, there has been slight interval increase in the intraventricular hemorrhage within the right lateral ventricle. Additionally, there is new uterus layering hemorrhage within the left lateral ventricle. No other new hemorrhage is noted.   Confluent supratentorial hypodensity, nonspecific, but likely secondary to moderate chronic microvascular ischemia. Mild generalized volume loss, with concordant ventricular enlargement.   The calvarium is unremarkable. Minimal paranasal sinus mucosal thickening. Moderate right mastoid effusions left mastoid air cells are clear. Bilateral lens replacements.       Slight interval increase right lateral ventricle hemorrhage with new trace left lateral ventricle dependent hemorrhage. Other findings as described above.   Signed by: Kwame Scanlon 6/28/2025 6:50 PM Dictation workstation:   [id number]    CT chest abdomen pelvis w IV contrast  Result Date: 6/28/2025  Interpreted By:  Belkys Núñez, STUDY: CT CHEST ABDOMEN PELVIS W IV CONTRAST;  6/28/2025 1:44 pm   INDICATION: Signs/Symptoms:Fall, HIA, distracting injury.     COMPARISON: 10/18/2023   ACCESSION NUMBER(S): [id number]   ORDERING CLINICIAN: SARA SMITH   TECHNIQUE: CT of the chest, abdomen, and pelvis was performed. Contiguous axial images were obtained at  5 mm slice thickness through the chest, and at  3 mm through the abdomen and pelvis. Coronal and sagittal reconstructions at  3 mm slice thickness were performed.  75 ML of Omnipaque 350 was administered intravenously without immediate complication.   FINDINGS: CHEST:   LUNG/PLEURA/LARGE AIRWAYS: No air space opacity, focal consolidation, pleural effusion/hemothorax, or  pneumothorax are appreciated.  There are no discrete pulmonary nodules. Subpleural areas of interstitial thickening likely chronic in nature or related to mild pulmonary edema.. Bibasilar atelectasis and/or scarring.   VESSELS: No traumatic aortic injury is appreciated within the limitations of this non-EKG gated study.  The thoracic aorta is of normal course and caliber. Aortic calcification. Main pulmonary artery is prominent. Consider pulmonary artery hypertension.. Coronary artery calcification.   HEART: Cardiomegaly. There is no pericardial effusion.   MEDIASTINUM AND RADHA: No pneumomediastinum, abnormal mediastinal fluid collection or mediastinal hematoma are appreciated.  No mediastinal, hilar or biaxillary adenopathy is present.  The esophagus is normal in course and caliber.   CHEST WALL AND LOWER NECK: No acute fracture or dislocation of the included osseous structures are appreciated.  No suspicious osseous lesions are identified.  The thoracic wall soft tissues are within normal limits.   ABDOMEN:   LIVER: No focal perfusion abnormality of the liver is appreciated to suggest contusion or laceration. There is no subcapsular hematoma, no perihepatic fluid collection. The liver is diminished in attenuation suggesting fatty infiltration.   GALLBLADDER: The gallbladder is nondistended. Tiny gallstones. No gallbladder wall thickening.   BILE DUCTS: The intahepatic and extrahepatic bile ducts are not dilated.   PANCREAS: The pancreas appears unremarkable.   SPLEEN: No parenchymal perfusion deficit of the spleen is appreciated to suggest contusion or laceration. There is no subcapsular hematoma, no perisplenic fluid collection.   ADRENAL GLANDS: The bilateral adrenal glands are unremarkable in appearance.   KIDNEYS AND URETERS: No parenchymal perfusion deficit is appreciated in bilateral kidneys to suggest contusion or laceration. There is no subcapsular hematoma, no perinephric fluid collection.  No  hydroureteronephrosis or nephroureterolithiasis is present. Asymmetrically smaller left kidney with cortical thinning. Upper pole medial left renal hypodensity likely a simple cyst.   PELVIS:   BLADDER: The urinary bladder appears within normal limits.   REPRODUCTIVE ORGANS: No definite pelvic masses.   BOWEL: The stomach is unremarkable.  The small bowel is normal in caliber without evidence of focal wall thickening or obstruction.  There is no evidence of focal wall thickening or dilatation of the large bowel.   VESSELS: Atherosclerotic changes of the abdominal aorta with aneurysmal dilatation of the infrarenal abdominal aorta measuring approximately d 33 mm in greatest diameter.   PERITONEUM/RETROPERITONEUM/LYMPH NODES: There is no evidence of intra- or retroperitoneal hematoma.  There is no free or loculated fluid collection, no free intraperitoneal air. No abdominopelvic lymphadenopathy is present.   BONES AND ABDOMINAL WALL: No evidence of acute fracture or dislocation of the included osseous structures.  No suspicious osseous lesions are identified.  Large partially calcified superficial soft tissue mass in the subcutaneous fat of the right buttock measuring approximately 52 by 33 mm. This is similar the prior examination of 09/13/2018.       CHEST 1. Cardiomegaly with coronary artery calcification along with mild bibasilar interstitial thickening which may represent mild bibasilar edema. 2. Prominence of the main pulmonary artery. Correlate with concern for pulmonary artery hypertension.   ABDOMEN - PELVIS 1. No acute abnormalities within the abdomen or pelvis. 2. Fatty infiltrated liver. 3. Atherosclerotic changes of the abdominal aorta with tortuosity and infrarenal aneurysm.   MACRO: None   Signed by: Belkys Núñez 6/28/2025 2:21 PM Dictation workstation:   [id number]    XR shoulder right 2+ views  Result Date: 6/28/2025  Interpreted By:  Belkys Núñez, STUDY: XR SHOULDER RIGHT 2+ VIEWS; 6/28/2025 10:41 am    INDICATION: Signs/Symptoms:Right shoulder pain after fall..   COMPARISON: None.   ACCESSION NUMBER(S): [id number]   ORDERING CLINICIAN: SARA SMITH   FINDINGS: Slightly displaced distal right clavicular fracture. No dislocation.. No lytic or blastic lesions or periosteal reaction. No significant arthritic changes.       Slightly displaced distal right clavicular fracture.     Signed by: Belkys Núñez 6/28/2025 11:39 AM Dictation workstation:   [id number]    CT thoracic spine wo IV contrast  Result Date: 6/28/2025  Interpreted By:  Mayank Almaguer, STUDY: CT THORACIC SPINE WO IV CONTRAST; 6/28/2025 9:55 am   INDICATION: CLINICAL DATA: Signs/Symptoms:Fall with back pain.   COMPARISON: None.   ACCESSION NUMBER(S): [id number]   ORDERING CLINICIAN: SARA SMITH   TECHNIQUE: A helical acquisition of data was obtained with multiplanar reconstructions performed.   One or more of the following dose reduction techniques were used: Automated exposure control Adjustment of the mA and/or kV according to patient size, and/or use of iterative reconstruction technique.   FINDINGS: No fractures or destructive lesions are identified. Bones are osteoporotic. There is marginal spurring anteriorly throughout the thoracic spine. Thoracic spinal canal is capacious. No paraspinous masses are identified.   Four-chamber cardiomegaly is present. Pulmonary parenchymal assessment is limited due to motion artifact.       No evidence for a fracture on this exam. Osteoporosis. Mild diffuse thoracic spondylosis. Cardiomegaly.   MACRO: none   Signed by: Mayank Almaguer 6/28/2025 11:38 AM Dictation workstation:   [id number]    CT lumbar spine wo IV contrast  Result Date: 6/28/2025  Interpreted By:  Mayank Almaguer, STUDY: CT LUMBAR SPINE WO IV CONTRAST; 6/28/2025 9:55 am   INDICATION: Signs/Symptoms:Fall with back pain.   COMPARISON: None   ACCESSION NUMBER(S): [id number]   ORDERING CLINICIAN: SARA SMITH   TECHNIQUE: A helical axial data  set was obtained and multiplanar reconstructions performed.   One or more of the following dose reduction techniques were used: Automated exposure control Adjustment of the mA and/or kV according to patient size, and/or use of iterative reconstruction technique.   FINDINGS: Bones are diffusely osteoporotic. No acute fractures or destructive lesions are identified. There is a mild lumbar dextroscoliosis. There is disc space narrowing with vacuum phenomenon from T12 through L5.   There is aneurysmal dilatation of the abdominal aorta with a maximum diameter of 3.5 cm.   There is a 1.9 cm upper pole left renal cyst. Left kidney is slightly atrophic.     L1-L2: Anterior marginal spurring is present at L1-2. There is a minimal, 1-2 mm anterior slippage of L1 relative to L2. There is mild foraminal encroachment bilaterally from osteophyte disc formation and facet arthropathy. Spinal canal appears capacious.   L2-L3: Spinal canal and intervertebral foramina are capacious at this level. Disc space calcification is present at L2-3. Schmorl's node is present at the superior endplate of L3.   L3-L4: There is grade 1, 2-3 mm retrolisthesis of L3 relative to L4. There is mild spinal canal stenosis secondary to the retrolisthesis of L3 along with a small osteophyte disc complex. There is also posterior encroachment upon the canal from ligamentum flavum thickening facet arthropathy. There is mild foraminal encroachment from facet arthropathy and osteophyte disc formation, slightly more marked on the left.   L4-L5: There is mild spinal canal stenosis at L4-5 secondary to anterior impingement upon the spinal canal from an osteophyte disc complex measuring on the order of 3-4 mm in AP diameter along with a minimal, 2-3 mm retrolisthesis of L4 relative to L5. Posterior encroachment upon the canal is present from facet arthropathy and ligamentum flavum thickening. There is mild foraminal encroachment bilaterally from osteophyte disc  formation and facet arthropathy.   L5-S1: There is minimal encroachment upon the spinal canal at L5-S1 from a mild broad-based disc protrusion. Facet arthropathy is present bilaterally. Intervertebral foramina appear to be capacious.       1. No fractures are identified. Bones are osteoporotic. 2. Diffuse lumbar spondylosis is present. Please see the individual disc level reports above. Spinal canal stenosis is present at L3-4 and L4-5. 3. Mild left renal atrophy. Left renal cyst. 4. Aneurysmal dilatation of the abdominal aorta with a maximum diameter of 3.5 cm. Appropriate follow-up is suggested.   MACRO: none   Signed by: Mayank Almaguer 6/28/2025 11:35 AM Dictation workstation:   [id number]    CT cervical spine wo IV contrast  Result Date: 6/28/2025  Interpreted By:  Mayank Almaguer, STUDY: CT CERVICAL SPINE WO IV CONTRAST; 6/28/2025 9:55 am   INDICATION: CLINICAL DATA: Signs/Symptoms:HIA. Head injury.   COMPARISON: 10/18/2023   ACCESSION NUMBER(S): [id number]   ORDERING CLINICIAN: SARA SMITH   TECHNIQUE: A helical acquisition of data was obtained with multiplanar reconstructions performed.   One or more of the following dose reduction techniques were used: Automated exposure control Adjustment of the mA and/or kV according to patient size, and/or use of iterative reconstruction technique.   FINDINGS: No fractures or destructive lesions are identified. There is disc space narrowing with anterior and posterior marginal spurring at C5-6. There is facet spurring on the left at C2-3 and C4-5. There is facet spurring on the right at C3-4. There is a cervical scoliosis with convexity to the right.   Incidental note is made of extensive vascular calcification.       No evidence for a fracture on this exam. Cervical spondylosis as described.   MACRO: none   Signed by: Mayank Almaguer 6/28/2025 11:29 AM Dictation workstation:   [id number]    CT head W O contrast trauma protocol  Result Date: 6/28/2025  Interpreted By:   Mayank Almaguer, STUDY: CT HEAD W/O CONTRAST TRAUMA PROTOCOL; 6/28/2025 9:55 am   INDICATION: Signs/Symptoms:HIA.   COMPARISON: 10/18/2023   ACCESSION NUMBER(S): [id number]   ORDERING CLINICIAN: SARA SMITH   TECHNIQUE: A helical acquisition data was obtained.   One or more of the following dose reduction techniques were used: Automated exposure control Adjustment of the mA and/or kV according to patient size, and/or use of iterative reconstruction technique.   FINDINGS: Intracranial findings: There is intraventricular hemorrhage identified within the occipital horn of the right lateral ventricle extending into the body of the right lateral ventricle, adjacent to the choroid plexus. There is no evidence for intraparenchymal hemorrhage. No subarachnoid hemorrhage is appreciated. No mass effect is identified. There is no evidence for hydrocephalus. No calvarial fractures are identified.   Paranasal sinuses and temporal bone findings: There is a small amount of fluid identified within the right mastoid air cells, new compared to 2023. The remainder of the visualized portions of the paranasal sinuses, mastoid air cells, and middle ear cavities are clear.   Orbital findings: The visualized portions of the orbits are unremarkable.       1. There is intracranial hemorrhage identified with blood products identified within the right lateral ventricular system involving the occipital horn, atrium, and body of the right lateral ventricle. 2. There is also a small amount of fluid within the right mastoid air cells, new from 2023 without evidence for an associated fracture. 3. Remainder of the findings are stable compared to 2023.       MACRO: Mayank Almaguer discussed the significance and urgency of this critical finding by EPIC secure chat with  SARA SMITH on 6/28/2025 at 11:24 am.  (**-RCF-**) Findings:  See findings.     Signed by: Mayank Almaguer 6/28/2025 11:25 AM Dictation workstation:   [id number]        Assessment  & Plan  Intracranial bleed (Multi)    Soledad Melendrez is a 89 y.o. female with PMH of T2DM, AAA, A fib, CAD, HLD, HTN, mitral/aortic/tricuspid valve regurg, hypothyroidism, and GERD who was BIBA due to a fall.ED course revealed tachypnea and tachycardia. Remaining vitals HDS. YRN. CT head revealed intracranail hemorrhage of occipital horn, atrium, and body of right lateral ventricle. XR R shoulder revealed slightly displaced distal R clavicular fracture. CT of lumbar/thoracic/cervical spine and C/A/P all revealed no further acute findings.      List of Injuries:  - ICH of occipital horn, atrium, and body of right lateral ventricle  - Slightly displaced distal R clavicular fracture    #ICH of R lateral ventricle  - HOB >30  - Q2 neuro checks until repeat CT  - Q4 neuro checks after CT x24 hrs  - Maintain SBP between 100-140  - Minimize metabolic demands    > Avoid fevers/treat infection    > Avoid hyponatremia     > Maintain euglycemia    > Avoid hypoxia  - NSGY consult  - DVT ppx w/ SCDs only      #R clavicle fracture  - Maintain sling  - Consult ortho  - Regular diet okay, ortho spoke with attending and injury is nonop  - Multimodal pain management  - Trend CBC  - PT/OT     Chronic Conditions  #T2DM  #AAA  #A fib  #CAD  #HLD  #HTN  #hypothyroidism  #GERD  - ICU team on consult, appreciate recs    Dispo: Not medically ready. Anticipate SNF needs at discharge following PT/OT assessment.    Patient will be seen and discussed with attending surgeon, Dr. Ibarra.    Wendy Lopez PA-C

## 2025-06-29 NOTE — CARE PLAN
Problem: General Stroke  Goal: Establish a mutual long term goal with patient by discharge  Outcome: Progressing  Goal: Demonstrate improvement in neurological exam throughout the shift  Outcome: Progressing  Goal: Maintain BP within ordered limits throughout shift  Outcome: Progressing  Goal: Participate in treatment (ie., meds, therapy) throughout shift  Outcome: Progressing  Goal: No symptoms of aspiration throughout shift  Outcome: Progressing  Goal: No symptoms of hemorrhage throughout shift  Outcome: Progressing  Goal: Tolerate enteral feeding throughout shift  Outcome: Progressing  Goal: Decreased nausea/vomiting throughout shift  Outcome: Progressing  Goal: Controlled blood glucose throughout shift  Outcome: Progressing  Goal: Out of bed three times today  Outcome: Progressing     Problem: ICU Stroke  Goal: Maintain ICP within ordered limits throughout shift  Outcome: Progressing  Goal: Tolerate EVD clamping trial throughout shift  Outcome: Progressing  Goal: Tolerate ventilator weaning trial during shift  Outcome: Progressing  Goal: Maintain patent airway throughout shift  Outcome: Progressing  Goal: Achieve/maintain targeted sodium level throughout shift  Outcome: Progressing     Problem: Pain - Adult  Goal: Verbalizes/displays adequate comfort level or baseline comfort level  Outcome: Progressing     Problem: Safety - Adult  Goal: Free from fall injury  Outcome: Progressing     Problem: Discharge Planning  Goal: Discharge to home or other facility with appropriate resources  Outcome: Progressing     Problem: Chronic Conditions and Co-morbidities  Goal: Patient's chronic conditions and co-morbidity symptoms are monitored and maintained or improved  Outcome: Progressing       Problem: Nutrition  Goal: Nutrient intake appropriate for maintaining nutritional needs  Outcome: Progressing     Problem: Skin  Goal: Decreased wound size/increased tissue granulation at next dressing change  Outcome:  Progressing  Goal: Participates in plan/prevention/treatment measures  Outcome: Progressing  Goal: Prevent/manage excess moisture  Outcome: Progressing  Goal: Prevent/minimize sheer/friction injuries  Outcome: Progressing  Goal: Promote/optimize nutrition  Outcome: Progressing  Goal: Promote skin healing  Outcome: Progressing        The patient's goals for the shift include      The clinical goals for the shift include Pt will remain hemodynamically stable

## 2025-06-30 VITALS
HEART RATE: 61 BPM | HEIGHT: 63 IN | WEIGHT: 116.18 LBS | OXYGEN SATURATION: 99 % | SYSTOLIC BLOOD PRESSURE: 124 MMHG | DIASTOLIC BLOOD PRESSURE: 70 MMHG | TEMPERATURE: 97.5 F | BODY MASS INDEX: 20.59 KG/M2 | RESPIRATION RATE: 14 BRPM

## 2025-06-30 LAB
ANION GAP SERPL CALC-SCNC: 15 MMOL/L (ref 10–20)
BUN SERPL-MCNC: 41 MG/DL (ref 6–23)
CALCIUM SERPL-MCNC: 8.9 MG/DL (ref 8.6–10.3)
CHLORIDE SERPL-SCNC: 98 MMOL/L (ref 98–107)
CO2 SERPL-SCNC: 29 MMOL/L (ref 21–32)
CREAT SERPL-MCNC: 1.18 MG/DL (ref 0.5–1.05)
EGFRCR SERPLBLD CKD-EPI 2021: 44 ML/MIN/1.73M*2
ERYTHROCYTE [DISTWIDTH] IN BLOOD BY AUTOMATED COUNT: 14.6 % (ref 11.5–14.5)
GLUCOSE BLD MANUAL STRIP-MCNC: 110 MG/DL (ref 74–99)
GLUCOSE BLD MANUAL STRIP-MCNC: 114 MG/DL (ref 74–99)
GLUCOSE BLD MANUAL STRIP-MCNC: 117 MG/DL (ref 74–99)
GLUCOSE BLD MANUAL STRIP-MCNC: 202 MG/DL (ref 74–99)
GLUCOSE SERPL-MCNC: 122 MG/DL (ref 74–99)
HCT VFR BLD AUTO: 43.1 % (ref 36–46)
HGB BLD-MCNC: 13.7 G/DL (ref 12–16)
INR PPP: 1.6 (ref 0.9–1.1)
MCH RBC QN AUTO: 33.7 PG (ref 26–34)
MCHC RBC AUTO-ENTMCNC: 31.8 G/DL (ref 32–36)
MCV RBC AUTO: 106 FL (ref 80–100)
NRBC BLD-RTO: 0 /100 WBCS (ref 0–0)
PLATELET # BLD AUTO: 111 X10*3/UL (ref 150–450)
POTASSIUM SERPL-SCNC: 4.3 MMOL/L (ref 3.5–5.3)
PROTHROMBIN TIME: 17.8 SECONDS (ref 9.8–12.4)
RBC # BLD AUTO: 4.07 X10*6/UL (ref 4–5.2)
SODIUM SERPL-SCNC: 138 MMOL/L (ref 136–145)
WBC # BLD AUTO: 8.1 X10*3/UL (ref 4.4–11.3)

## 2025-06-30 PROCEDURE — 85610 PROTHROMBIN TIME: CPT | Performed by: PHYSICIAN ASSISTANT

## 2025-06-30 PROCEDURE — 36415 COLL VENOUS BLD VENIPUNCTURE: CPT

## 2025-06-30 PROCEDURE — 2500000005 HC RX 250 GENERAL PHARMACY W/O HCPCS

## 2025-06-30 PROCEDURE — 97162 PT EVAL MOD COMPLEX 30 MIN: CPT | Mod: GP

## 2025-06-30 PROCEDURE — 97165 OT EVAL LOW COMPLEX 30 MIN: CPT | Mod: GO

## 2025-06-30 PROCEDURE — 36415 COLL VENOUS BLD VENIPUNCTURE: CPT | Performed by: PHYSICIAN ASSISTANT

## 2025-06-30 PROCEDURE — 2500000001 HC RX 250 WO HCPCS SELF ADMINISTERED DRUGS (ALT 637 FOR MEDICARE OP)

## 2025-06-30 PROCEDURE — 80048 BASIC METABOLIC PNL TOTAL CA: CPT

## 2025-06-30 PROCEDURE — 85027 COMPLETE CBC AUTOMATED: CPT

## 2025-06-30 PROCEDURE — 2500000002 HC RX 250 W HCPCS SELF ADMINISTERED DRUGS (ALT 637 FOR MEDICARE OP, ALT 636 FOR OP/ED)

## 2025-06-30 PROCEDURE — 2500000004 HC RX 250 GENERAL PHARMACY W/ HCPCS (ALT 636 FOR OP/ED)

## 2025-06-30 PROCEDURE — 2500000005 HC RX 250 GENERAL PHARMACY W/O HCPCS: Performed by: EMERGENCY MEDICINE

## 2025-06-30 PROCEDURE — 99233 SBSQ HOSP IP/OBS HIGH 50: CPT | Performed by: NURSE PRACTITIONER

## 2025-06-30 PROCEDURE — 82947 ASSAY GLUCOSE BLOOD QUANT: CPT

## 2025-06-30 PROCEDURE — 1100000001 HC PRIVATE ROOM DAILY

## 2025-06-30 PROCEDURE — 99221 1ST HOSP IP/OBS SF/LOW 40: CPT | Performed by: INTERNAL MEDICINE

## 2025-06-30 RX ORDER — FUROSEMIDE 40 MG/1
40 TABLET ORAL EVERY MORNING
Status: DISCONTINUED | OUTPATIENT
Start: 2025-06-30 | End: 2025-07-01 | Stop reason: HOSPADM

## 2025-06-30 RX ORDER — PANTOPRAZOLE SODIUM 20 MG/1
20 TABLET, DELAYED RELEASE ORAL
Status: DISCONTINUED | OUTPATIENT
Start: 2025-07-01 | End: 2025-07-01 | Stop reason: HOSPADM

## 2025-06-30 RX ORDER — POLYETHYLENE GLYCOL 3350 17 G/17G
17 POWDER, FOR SOLUTION ORAL DAILY
Status: DISCONTINUED | OUTPATIENT
Start: 2025-06-30 | End: 2025-07-01 | Stop reason: HOSPADM

## 2025-06-30 RX ORDER — FUROSEMIDE 40 MG/1
40 TABLET ORAL EVERY EVENING
Status: DISCONTINUED | OUTPATIENT
Start: 2025-06-30 | End: 2025-07-01 | Stop reason: HOSPADM

## 2025-06-30 RX ORDER — LABETALOL HYDROCHLORIDE 5 MG/ML
10 INJECTION, SOLUTION INTRAVENOUS EVERY 4 HOURS PRN
Status: DISCONTINUED | OUTPATIENT
Start: 2025-06-30 | End: 2025-07-01 | Stop reason: HOSPADM

## 2025-06-30 RX ORDER — SENNOSIDES 8.6 MG/1
1 TABLET ORAL NIGHTLY
Status: DISCONTINUED | OUTPATIENT
Start: 2025-06-30 | End: 2025-07-01 | Stop reason: HOSPADM

## 2025-06-30 RX ORDER — HYDRALAZINE HYDROCHLORIDE 20 MG/ML
10 INJECTION INTRAMUSCULAR; INTRAVENOUS EVERY 4 HOURS PRN
Status: DISCONTINUED | OUTPATIENT
Start: 2025-06-30 | End: 2025-07-01 | Stop reason: HOSPADM

## 2025-06-30 RX ADMIN — ACETAMINOPHEN 975 MG: 325 TABLET ORAL at 08:32

## 2025-06-30 RX ADMIN — SENNOSIDES 8.6 MG: 8.6 TABLET, FILM COATED ORAL at 20:37

## 2025-06-30 RX ADMIN — ATORVASTATIN CALCIUM 20 MG: 20 TABLET, FILM COATED ORAL at 08:33

## 2025-06-30 RX ADMIN — LEVOTHYROXINE SODIUM 50 MCG: 0.05 TABLET ORAL at 06:04

## 2025-06-30 RX ADMIN — METOPROLOL TARTRATE 25 MG: 25 TABLET, FILM COATED ORAL at 08:32

## 2025-06-30 RX ADMIN — FUROSEMIDE 40 MG: 40 TABLET ORAL at 11:27

## 2025-06-30 RX ADMIN — PANTOPRAZOLE SODIUM 40 MG: 40 INJECTION, POWDER, FOR SOLUTION INTRAVENOUS at 08:33

## 2025-06-30 RX ADMIN — FUROSEMIDE 40 MG: 40 TABLET ORAL at 20:37

## 2025-06-30 RX ADMIN — POLYETHYLENE GLYCOL 3350 17 G: 17 POWDER, FOR SOLUTION ORAL at 11:27

## 2025-06-30 RX ADMIN — Medication 3 L/MIN: at 08:30

## 2025-06-30 RX ADMIN — ACETAMINOPHEN 975 MG: 325 TABLET ORAL at 15:32

## 2025-06-30 RX ADMIN — LIDOCAINE 1 PATCH: 4 PATCH TOPICAL at 08:32

## 2025-06-30 RX ADMIN — ESCITALOPRAM OXALATE 5 MG: 10 TABLET ORAL at 08:32

## 2025-06-30 RX ADMIN — GABAPENTIN 100 MG: 100 CAPSULE ORAL at 20:37

## 2025-06-30 RX ADMIN — ACETAMINOPHEN 975 MG: 325 TABLET ORAL at 20:38

## 2025-06-30 RX ADMIN — Medication 3 L/MIN: at 20:38

## 2025-06-30 RX ADMIN — GABAPENTIN 100 MG: 100 CAPSULE ORAL at 08:32

## 2025-06-30 RX ADMIN — METOPROLOL TARTRATE 25 MG: 25 TABLET, FILM COATED ORAL at 20:38

## 2025-06-30 RX ADMIN — INSULIN LISPRO 2 UNITS: 100 INJECTION, SOLUTION INTRAVENOUS; SUBCUTANEOUS at 12:19

## 2025-06-30 RX ADMIN — HYDRALAZINE HYDROCHLORIDE 10 MG: 20 INJECTION INTRAMUSCULAR; INTRAVENOUS at 07:06

## 2025-06-30 ASSESSMENT — PAIN SCALES - GENERAL
PAINLEVEL_OUTOF10: 0 - NO PAIN

## 2025-06-30 ASSESSMENT — COGNITIVE AND FUNCTIONAL STATUS - GENERAL
TURNING FROM BACK TO SIDE WHILE IN FLAT BAD: A LOT
HELP NEEDED FOR BATHING: TOTAL
MOBILITY SCORE: 11
DAILY ACTIVITIY SCORE: 11
MOVING FROM LYING ON BACK TO SITTING ON SIDE OF FLAT BED WITH BEDRAILS: A LOT
TOILETING: TOTAL
DRESSING REGULAR LOWER BODY CLOTHING: TOTAL
STANDING UP FROM CHAIR USING ARMS: A LOT
PERSONAL GROOMING: A LITTLE
EATING MEALS: A LITTLE
WALKING IN HOSPITAL ROOM: A LOT
CLIMB 3 TO 5 STEPS WITH RAILING: TOTAL
DRESSING REGULAR UPPER BODY CLOTHING: A LOT
MOVING TO AND FROM BED TO CHAIR: A LOT

## 2025-06-30 ASSESSMENT — PAIN - FUNCTIONAL ASSESSMENT
PAIN_FUNCTIONAL_ASSESSMENT: 0-10

## 2025-06-30 ASSESSMENT — ACTIVITIES OF DAILY LIVING (ADL): ADL_ASSISTANCE: INDEPENDENT

## 2025-06-30 NOTE — SIGNIFICANT EVENT
Soledad Melendrez is a 89 y.o. female with HTN, HLD, hypothyroidism, GERD, afib on Coumadin, CAD s/p CABG, HFpEF, AAA, T2DM, who presented with a fall.  No acute overnight events.  Her warfarin is being held.  Switch her from IV Protonix to oral and resumed her Jardiance along with her Lasix.  Can resume spironolactone tomorrow.  Continue weaning her O2 requirements.    Dr. Jacek Vega  Internal Medicine     18

## 2025-06-30 NOTE — PROGRESS NOTES
06/30/25 1553   Discharge Planning   Living Arrangements Children   Support Systems Children;Family members   Assistance Needed needs help with adls, ambulates with Rollator or cane, does not drive   Type of Residence Private residence   Number of Stairs to Enter Residence 2   Number of Stairs Within Residence 12   Do you have animals or pets at home? Yes   Type of Animals or Pets 1 dog   Home or Post Acute Services In home services   Type of Home Care Services Home health aide;Home nursing visits;Home OT;Home PT   Expected Discharge Disposition Home H   Does the patient need discharge transport arranged? No   Patient Choice   Provider Choice list and CMS website (https://medicare.gov/care-compare#search) for post-acute Quality and Resource Measure Data were provided and reviewed with: Family   Patient / Family choosing to utilize agency / facility established prior to hospitalization No     Record reviewed.  BIBA from home after fall.  Admitted for intracranial bleed.  Ortho and Neuro on consult.  PMH includes HTN, Afib (Coumadin), CAD (s/p CABG), DM II, AAA.  This TCC phoned and spoke with patient's granddaughter (Family Med physician) and granddaughter's spouse (Hospitalist), introduced self and explained role.  Demographic information and insurance verified.  Patient is from home with daughter and KANDIS (granddaughter's parents).  Daughter is caregiver for patient and KANDIS, who has MS.  Wears home O2 2-4L/NC at baseline.  Carissa Berrios, with DME notified of patient's admission.  Denies SW needs at this time.  PT/OT recommending moderate intensity therapy.  Family declining SNF, and plans to take patient home, with home health aide and home care.   [place] of choice explained and C list left at bedside as requested by family.  Family is planned to provide transportation home.  Care Transitions will continue to follow.

## 2025-06-30 NOTE — PROGRESS NOTES
"Soledad Melendrez is a 89 y.o. female on day 2 of admission presenting with Intracranial bleed (Multi).    Subjective   Patient sitting in bed. Continues to endorse pain to her back, R shoulder, and upper right head. 18hr CT head was stable. Non op per NSGY. Awaiting PT/OT eval. YRN slightly improved. H/H stable. Plt Ct 111.    Objective     Physical Exam  Vitals reviewed.   Constitutional:       General: She is not in acute distress.     Appearance: Normal appearance.   HENT:      Head: Normocephalic.        Comments: Pain to right upper part of head     Nose:      Comments: NC in nares  Eyes:      Extraocular Movements: Extraocular movements intact.   Cardiovascular:      Rate and Rhythm: Normal rate and regular rhythm.   Pulmonary:      Effort: Pulmonary effort is normal. No respiratory distress.      Comments: 3L via NC  Abdominal:      General: Bowel sounds are normal.      Palpations: Abdomen is soft.      Tenderness: There is no abdominal tenderness.   Musculoskeletal:      Comments: R shoulder tender- in sling. Nontender to R elbow or hand. Sensation intact throughout all extremities   Skin:     General: Skin is warm and dry.   Neurological:      Mental Status: She is alert. Mental status is at baseline.   Psychiatric:         Mood and Affect: Mood normal.         Behavior: Behavior normal.         Last Recorded Vitals  Blood pressure 104/59, pulse 78, temperature 35.3 °C (95.5 °F), temperature source Temporal, resp. rate 17, height 1.6 m (5' 3\"), weight 52.7 kg (116 lb 2.9 oz), SpO2 97%.  Intake/Output last 3 Shifts:  I/O last 3 completed shifts:  In: 1341 (25.4 mL/kg) [I.V.:631 (12 mL/kg); Blood:210; IV Piggyback:500]  Out: 1100 (20.9 mL/kg) [Urine:1100 (0.6 mL/kg/hr)]  Weight: 52.7 kg     Relevant Results  Results for orders placed or performed during the hospital encounter of 06/28/25 (from the past 24 hours)   POCT GLUCOSE   Result Value Ref Range    POCT Glucose 190 (H) 74 - 99 mg/dL   POCT " GLUCOSE   Result Value Ref Range    POCT Glucose 106 (H) 74 - 99 mg/dL   POCT GLUCOSE   Result Value Ref Range    POCT Glucose 158 (H) 74 - 99 mg/dL   CBC   Result Value Ref Range    WBC 8.1 4.4 - 11.3 x10*3/uL    nRBC 0.0 0.0 - 0.0 /100 WBCs    RBC 4.07 4.00 - 5.20 x10*6/uL    Hemoglobin 13.7 12.0 - 16.0 g/dL    Hematocrit 43.1 36.0 - 46.0 %     (H) 80 - 100 fL    MCH 33.7 26.0 - 34.0 pg    MCHC 31.8 (L) 32.0 - 36.0 g/dL    RDW 14.6 (H) 11.5 - 14.5 %    Platelets 111 (L) 150 - 450 x10*3/uL   Basic Metabolic Panel   Result Value Ref Range    Glucose 122 (H) 74 - 99 mg/dL    Sodium 138 136 - 145 mmol/L    Potassium 4.3 3.5 - 5.3 mmol/L    Chloride 98 98 - 107 mmol/L    Bicarbonate 29 21 - 32 mmol/L    Anion Gap 15 10 - 20 mmol/L    Urea Nitrogen 41 (H) 6 - 23 mg/dL    Creatinine 1.18 (H) 0.50 - 1.05 mg/dL    eGFR 44 (L) >60 mL/min/1.73m*2    Calcium 8.9 8.6 - 10.3 mg/dL   POCT GLUCOSE   Result Value Ref Range    POCT Glucose 117 (H) 74 - 99 mg/dL     *Note: Due to a large number of results and/or encounters for the requested time period, some results have not been displayed. A complete set of results can be found in Results Review.     CT head wo IV contrast  Result Date: 6/29/2025  Interpreted By:  Jeramy Ramos, STUDY: CT HEAD WO IV CONTRAST; ;  6/28/2025 11:49 pm   INDICATION: Signs/Symptoms:F/u intraventricular bleed progression.     COMPARISON: 06/28/2025   ACCESSION NUMBER(S): [id number]   ORDERING CLINICIAN: ALPHONSE MARTE   TECHNIQUE: Serial axial unenhanced CT images obtained of the head. Images reformatted in the coronal and sagittal projection.   FINDINGS: Intraventricular hemorrhage is demonstrated within the right lateral ventricle within the right occipital horn and trigone of the right lateral ventricle involving portions of the body of the right lateral ventricle with similar distribution appearance from the prior examination. No significant interval change. Also, subtle dependent  component of intraventricular hemorrhage within the trigone of the left lateral ventricle as seen on prior imaging.   Moderate parenchymal volume loss with prominence of the ventricles, sulci, and cisterns. Mild periventricular and subcortical white matter change seen likely related to chronic small-vessel ischemic change. Gray-white matter differentiation is maintained.   Visualized osseous structures demonstrate unremarkable paranasal sinuses. Partial calcification of the right mastoid air cells demonstrated. There is hyperostosis frontalis.               1. Stable intraventricular hemorrhage in particular within the right lateral ventricle as described above. Of note, trace intraventricular hemorrhage independent trigone of the left lateral ventricle stable.     MACRO: None   Signed by: Jeramy Ramos 6/29/2025 11:27 AM Dictation workstation:   [id number]    CT head wo IV contrast  Result Date: 6/28/2025  Interpreted By:  Kwame Scanlon, STUDY: CT HEAD WO IV CONTRAST;  6/28/2025 5:27 pm   INDICATION: Signs/Symptoms:6-hour repeat CT for intraventricular bleed.   COMPARISON: Multiple CTs of the brain most recently 06/28/2025   ACCESSION NUMBER(S): [id number]   ORDERING CLINICIAN: SARA SMITH   TECHNIQUE: Noncontrast axial CT scan of head was performed.   FINDINGS: Compared with the examination from approximately 7 hours ago, there has been slight interval increase in the intraventricular hemorrhage within the right lateral ventricle. Additionally, there is new uterus layering hemorrhage within the left lateral ventricle. No other new hemorrhage is noted.   Confluent supratentorial hypodensity, nonspecific, but likely secondary to moderate chronic microvascular ischemia. Mild generalized volume loss, with concordant ventricular enlargement.   The calvarium is unremarkable. Minimal paranasal sinus mucosal thickening. Moderate right mastoid effusions left mastoid air cells are clear. Bilateral lens  replacements.       Slight interval increase right lateral ventricle hemorrhage with new trace left lateral ventricle dependent hemorrhage. Other findings as described above.   Signed by: Kwame Scanlon 6/28/2025 6:50 PM Dictation workstation:   [id number]    CT chest abdomen pelvis w IV contrast  Result Date: 6/28/2025  Interpreted By:  Belkys Núñez, STUDY: CT CHEST ABDOMEN PELVIS W IV CONTRAST;  6/28/2025 1:44 pm   INDICATION: Signs/Symptoms:Fall, HIA, distracting injury.     COMPARISON: 10/18/2023   ACCESSION NUMBER(S): [id number]   ORDERING CLINICIAN: SARA SMITH   TECHNIQUE: CT of the chest, abdomen, and pelvis was performed. Contiguous axial images were obtained at  5 mm slice thickness through the chest, and at  3 mm through the abdomen and pelvis. Coronal and sagittal reconstructions at  3 mm slice thickness were performed.  75 ML of Omnipaque 350 was administered intravenously without immediate complication.   FINDINGS: CHEST:   LUNG/PLEURA/LARGE AIRWAYS: No air space opacity, focal consolidation, pleural effusion/hemothorax, or pneumothorax are appreciated.  There are no discrete pulmonary nodules. Subpleural areas of interstitial thickening likely chronic in nature or related to mild pulmonary edema.. Bibasilar atelectasis and/or scarring.   VESSELS: No traumatic aortic injury is appreciated within the limitations of this non-EKG gated study.  The thoracic aorta is of normal course and caliber. Aortic calcification. Main pulmonary artery is prominent. Consider pulmonary artery hypertension.. Coronary artery calcification.   HEART: Cardiomegaly. There is no pericardial effusion.   MEDIASTINUM AND RADHA: No pneumomediastinum, abnormal mediastinal fluid collection or mediastinal hematoma are appreciated.  No mediastinal, hilar or biaxillary adenopathy is present.  The esophagus is normal in course and caliber.   CHEST WALL AND LOWER NECK: No acute fracture or dislocation of the included osseous  structures are appreciated.  No suspicious osseous lesions are identified.  The thoracic wall soft tissues are within normal limits.   ABDOMEN:   LIVER: No focal perfusion abnormality of the liver is appreciated to suggest contusion or laceration. There is no subcapsular hematoma, no perihepatic fluid collection. The liver is diminished in attenuation suggesting fatty infiltration.   GALLBLADDER: The gallbladder is nondistended. Tiny gallstones. No gallbladder wall thickening.   BILE DUCTS: The intahepatic and extrahepatic bile ducts are not dilated.   PANCREAS: The pancreas appears unremarkable.   SPLEEN: No parenchymal perfusion deficit of the spleen is appreciated to suggest contusion or laceration. There is no subcapsular hematoma, no perisplenic fluid collection.   ADRENAL GLANDS: The bilateral adrenal glands are unremarkable in appearance.   KIDNEYS AND URETERS: No parenchymal perfusion deficit is appreciated in bilateral kidneys to suggest contusion or laceration. There is no subcapsular hematoma, no perinephric fluid collection.  No hydroureteronephrosis or nephroureterolithiasis is present. Asymmetrically smaller left kidney with cortical thinning. Upper pole medial left renal hypodensity likely a simple cyst.   PELVIS:   BLADDER: The urinary bladder appears within normal limits.   REPRODUCTIVE ORGANS: No definite pelvic masses.   BOWEL: The stomach is unremarkable.  The small bowel is normal in caliber without evidence of focal wall thickening or obstruction.  There is no evidence of focal wall thickening or dilatation of the large bowel.   VESSELS: Atherosclerotic changes of the abdominal aorta with aneurysmal dilatation of the infrarenal abdominal aorta measuring approximately d 33 mm in greatest diameter.   PERITONEUM/RETROPERITONEUM/LYMPH NODES: There is no evidence of intra- or retroperitoneal hematoma.  There is no free or loculated fluid collection, no free intraperitoneal air. No abdominopelvic  lymphadenopathy is present.   BONES AND ABDOMINAL WALL: No evidence of acute fracture or dislocation of the included osseous structures.  No suspicious osseous lesions are identified.  Large partially calcified superficial soft tissue mass in the subcutaneous fat of the right buttock measuring approximately 52 by 33 mm. This is similar the prior examination of 09/13/2018.       CHEST 1. Cardiomegaly with coronary artery calcification along with mild bibasilar interstitial thickening which may represent mild bibasilar edema. 2. Prominence of the main pulmonary artery. Correlate with concern for pulmonary artery hypertension.   ABDOMEN - PELVIS 1. No acute abnormalities within the abdomen or pelvis. 2. Fatty infiltrated liver. 3. Atherosclerotic changes of the abdominal aorta with tortuosity and infrarenal aneurysm.   MACRO: None   Signed by: Belkys Núñez 6/28/2025 2:21 PM Dictation workstation:   [id number]    XR shoulder right 2+ views  Result Date: 6/28/2025  Interpreted By:  Belkys Núñez, STUDY: XR SHOULDER RIGHT 2+ VIEWS; 6/28/2025 10:41 am   INDICATION: Signs/Symptoms:Right shoulder pain after fall..   COMPARISON: None.   ACCESSION NUMBER(S): [id number]   ORDERING CLINICIAN: SARA SMITH   FINDINGS: Slightly displaced distal right clavicular fracture. No dislocation.. No lytic or blastic lesions or periosteal reaction. No significant arthritic changes.       Slightly displaced distal right clavicular fracture.     Signed by: Belkys Núñez 6/28/2025 11:39 AM Dictation workstation:   [id number]        Assessment & Plan  Intracranial bleed (Multi)    Soledad Melendrez is a 89 y.o. female with PMH of T2DM, AAA, A fib, CAD, HLD, HTN, mitral/aortic/tricuspid valve regurg, hypothyroidism, and GERD who was BIBA due to a fall.ED course revealed tachypnea and tachycardia. Remaining vitals HDS. YRN. CT head revealed intracranail hemorrhage of occipital horn, atrium, and body of right lateral ventricle. XR R  shoulder revealed slightly displaced distal R clavicular fracture. CT of lumbar/thoracic/cervical spine and C/A/P all revealed no further acute findings.      List of Injuries:  - ICH of occipital horn, atrium, and body of right lateral ventricle  - Trace left lateral ventricle dependent hemorrhage   - Slightly displaced distal R clavicular fracture    #ICH of R lateral ventricle  #Trace left lateral ventricle dependent hemorrhage   - HOB >30  - Q4 neuro checks after CT x24 hrs  - Maintain SBP between 100-140  - Minimize metabolic demands    > Avoid fevers/treat infection    > Avoid hyponatremia     > Maintain euglycemia    > Avoid hypoxia  - NSGY consult-appreciate recs    >non-op    >Hold Coumadin    >No need for repeat cranial imaging during admission unless there is a change in the patient's neurologic exam    >follow-up in neurosurgery clinic in 2 weeks with a repeat head CT at that time; if negative/improved, Coumadin can be restarted at that time  - DVT ppx w/ SCDs only      #R clavicle fracture  - Maintain sling  - Consult ortho    >non-op    >NWB to the RUE and sling for 6 weeks     >repeat x-rays in 3 weeks   - Regular diet   - Multimodal pain management  - Trend CBC  - PT/OT     Chronic Conditions  #T2DM  #AAA  #A fib  #CAD  #HLD  #HTN  #hypothyroidism  #GERD  - ICU team on consult, appreciate recs    Dispo: Not medically ready. Anticipate SNF needs at discharge following PT/OT assessment. Can be transferred out of the ICU to [facility]. Follow up with ortho and NSGY.    Patient seen and discussed with attending surgeon, Dr. Ibarra.    Tamara Sandra, APRN-CNP

## 2025-06-30 NOTE — CONSULTS
"Hospital Medicine Consult Note    Subjective:  Soledad Melendrez is a 89 y.o. female with HTN, HLD, hypothyroidism, GERD, afib on Coumadin, CAD s/p CABG, HFpEF, AAA, T2DM, who presented with a fall. Workup revealed intracranial hemorrhage involving right lateral ventricular system and slightly displaced right clavicular fracture. Pt was admitted to ICU/ trauma surgery. She received vitamin K & FFP. Repeat CT showed interval enlargement of right lateral ventricle hemorrhage, subsequent CT with stable findings. Neurosurgery consulted, advised holding coumadin and outpatient follow up. Ortho consulted, advised nonoperative tx, NWB to the RUE & sling for 6 weeks with OP follow up. Pt now stable for transfer out of ICU therefore medicine consulted. Pt feels ok today, endorses some pain in her lower chest/ epigastrium, worse with deep breaths.       A 10 point ROS was completed and is negative expect as stated in HPI.     PMHx: As above  PSHx: CABG, appendectomy, bilateral CEA, tonsillectomy with adenoidectomy   Social Hx: former smoker, denies alcohol use, denies illicit drug use  Family Hx: CVA, HTN, DM    Objective:    /74   Pulse 65   Temp 36.1 °C (97 °F) (Temporal)   Resp 12   Ht 1.6 m (5' 3\")   Wt 52.7 kg (116 lb 2.9 oz)   SpO2 100%   BMI 20.58 kg/m²     Physical Exam:  General: Awake, alert, no distress, cooperative  HEENT: NC/AT, clear sclera, MMM  NECK: Supple  Cardiovascular: Irregular, no murmurs. S1/S2  Respiratory: CTAB, no RRW or crackles. No distress  Abdomen: Soft, ND, non-tender  Extremities: No peripheral edema. RUE in sling  Neurological: No focal deficits  Skin: Warm and dry, no rashes  Psych: appropriate mood and affect    I personally reviewed all imaging, labs and notes/ documentation.     Assessment & Plan:    Traumatic ICH  R minimally displaced distal clavicular fx  YRN, improving     HTN, HLD  Hypothyroidism  GERD, afib on Coumadin  CAD s/p CABG  HFpEF  AAA  T2DM    Stable on " last CT, neurosurgery following, holding coumadin, OP follow up, neuro checks per protocol, monitor hemodynamics closely, pain control  Ortho consulted, advised nonoperative tx, NWB to the E & sling for 6 weeks with OP follow up   Monitor renal function avoid nephrotoxins  PT, OT  Continue home meds, aldactone & lasix on hold. ISS, monitor BG    DVT Prophylaxis: SCDs only  Code Status: DNR and No Intubation   Disposition: Transfer out of ICU      Norma Vega DO  Hospitalist

## 2025-06-30 NOTE — PROGRESS NOTES
Physical Therapy    Physical Therapy Evaluation    Patient Name: Soledad Melendrez  MRN: [id number]  178/178-A  Today's Date: 6/30/2025   Time Calculation  Start Time: 0954  Stop Time: 1016  Time Calculation (min): 22 min    Assessment/Plan   PT Assessment  PT Assessment Results: Decreased strength, Decreased endurance, Impaired balance, Decreased mobility  Rehab Prognosis: Good  Barriers to Discharge Home: Physical needs  Physical Needs: Ambulating household distances limited by function/safety, High falls risk due to function or environment  End of Session Communication: Bedside nurse  Assessment Comment: Pt is presenting with a decline in functional mobility from baseline. Pt would benefit from further PT services to address these deficits to return to prior level of function.  End of Session Patient Position: Bed, 3 rail up, Alarm off, not on at start of session (Pt put back in bed due to no chair available for pt use on unit)  IP OR SWING BED PT PLAN  Inpatient or Swing Bed: Inpatient  PT Plan  Treatment/Interventions: Bed mobility, Transfer training, Gait training, Balance training, Strengthening, Endurance training, Therapeutic exercise, Therapeutic activity  PT Plan: Ongoing PT  PT Frequency: 3 times per week (during hospitalization)  PT Discharge Recommendations: Moderate intensity level of continued care (Based on current functional status and rehab potential, patient is anticipated to tolerate and benefit from 5 or more days per week of skilled rehabilitative therapy after discharge from this acute inpatient hospitilization.)  PT Recommended Transfer Status: Assist x2  PT - OK to Discharge: Yes    Subjective     General Visit Information:  General  Reason for Referral: PT eval and treat; s/p fall, tachypnea, tachycardia, xray: slightly displaced distable right clavicular fx--nonoperative management per ortho, NWB RUE with sling x 6 weeks, CT head: intracranial hemorrahe of occipital horn, atrium, body  of right lateral ventricle  Family/Caregiver Present: Yes (grand daughter and son in law)  Co-Treatment: OT  Co-Treatment Reason: For patient safety and to maximize mobility  Prior to Session Communication: Bedside nurse  General Comment: Pt resting in bed upon entering, agreeable to PT    Home Living:  Home Living  Type of Home: House  Lives With:  (daughter and son in law)  Home Adaptive Equipment:  (rollator, cane, commode)  Home Layout: One level  Home Access: Stairs to enter without rails  Entrance Stairs-Number of Steps: 3  Bathroom Shower/Tub: Tub/shower unit  Bathroom Toilet: Adaptive toilet seating  Bathroom Equipment: Grab bars in shower, Shower chair with back (3 in 1 commmode over toilet)    Prior Level of Function:  Prior Function Per Pt/Caregiver Report  ADL Assistance: Independent (independent with sponge bathing and dressing, assist with showering)  Homemaking Assistance: Needs assistance (family completes IADLs)  Ambulatory Assistance: Independent (with rollator, uses cane to go down to basement)    Precautions:  Precautions  Precautions Comment: telemetry, PIV, [place]ian speaking, family translates, NWB RUE with sling for 6 weeks    Vital Signs:  Vital Signs  Vital Signs Comment: VSS throughout session  Objective     Pain:  Pain Assessment  Pain Assessment: 0-10  0-10 (Numeric) Pain Score:  (pt reports mild to moderate RUE shoulder pain, unable to rate with numbers, pt repositioned during session)    Cognition:  Cognition  Overall Cognitive Status:  (Oriented to self and place, grand daughter translates)    General Assessments:      Activity Tolerance  Endurance: Decreased tolerance for upright activites  Early Mobility/Exercise Safety Screen: Proceed with mobilization - No exclusion criteria met  Sensation  Light Touch: No apparent deficits  Strength  Strength Comments: BLE WFL for strength and ROM  Perception  Inattention/Neglect: Appears intact  Coordination  Movements are Fluid and Coordinated:  Yes  Postural Control  Postural Control:  (SBA sitting EOB)  Static Sitting Balance  Static Sitting-Level of Assistance: Close supervision  Dynamic Sitting Balance  Dynamic Sitting-Level of Assistance: Minimum assistance  Static Standing Balance  Static Standing-Level of Assistance: Moderate assistance  Dynamic Standing Balance  Dynamic Standing-Level of Assistance: Moderate assistance    Functional Assessments:     Bed Mobility  Bed Mobility: Yes  Bed Mobility 1  Bed Mobility Comments 1: supine to sit with mod assist x 1, sit to supine with mod assist x2  Transfers  Transfer: Yes  Transfer 1  Trials/Comments 1: sit to stand with mod assist x 2 with HHA. Cues for safety and technique  Ambulation/Gait Training  Ambulation/Gait Training Performed:  (Pt completes 2-3 steps forward and backward, then lateral sidesteps with mod assist x 2 with HHA on LUE. Pt unsteady throughout with posterior lean. Pt will fall without assist. Cues provided for upright posture and sequencing.)          Extremity/Trunk Assessments:        RLE   RLE : Within Functional Limits  LLE   LLE : Within Functional Limits    Outcome Measures:  [facility] Basic Mobility  Turning from your back to your side while in a flat bed without using bedrails: A lot  Moving from lying on your back to sitting on the side of a flat bed without using bedrails: A lot  Moving to and from bed to chair (including a wheelchair): A lot  Standing up from a chair using your arms (e.g. wheelchair or bedside chair): A lot  To walk in hospital room: A lot  Climbing 3-5 steps with railing: Total  Basic Mobility - Total Score: 11           FSS-ICU  Ambulation: Walks <50 feet with any assistance x1 or walks any distance with assistance x2 people  Rolling: Moderate assistance (performs 50 - 74% of task)  Sitting: Minimal assistance (performs 75% or more of task)  Transfer Sit-to-Stand: Moderate assistance (performs 50 - 74% of task)  Transfer Supine-to-Sit: Moderate assistance  (performs 50 - 74% of task)  Total Score: 14  ICU Mobility Screen  Early Mobility/Exercise Safety Screen: Proceed with mobilization - No exclusion criteria met  ICU Mobility Scale: Marching on spot (at bedside)  E = Exercise and Early Mobility  Early Mobility/Exercise Safety Screen: Proceed with mobilization - No exclusion criteria met  ICU Mobility Scale: Marching on spot (at bedside)          Goals:  Encounter Problems       Encounter Problems (Active)       PT Problem       PT Goal 1 STG - Pt will amb 35' using LRD with CGA  (Progressing)       Start:  06/30/25    Expected End:  07/14/25            PT Goal 2 STG - Pt will transition supine <> sitting with CGA (Progressing)       Start:  06/30/25    Expected End:  07/14/25            PT Goal 3 STG - Pt will SPT bed <> chair with CGA (Progressing)       Start:  06/30/25    Expected End:  07/14/25            PT Goal 4 STG - Pt will transfer STS with CGA (Progressing)       Start:  06/30/25    Expected End:  07/14/25                 Education Documentation  Mobility Training, taught by Kaitlyn Rubio, PT at 6/30/2025  1:36 PM.  Learner: Patient  Readiness: Acceptance  Method: Explanation  Response: Verbalizes Understanding, Needs Reinforcement  Comment: PT POC    Education Comments  No comments found.

## 2025-06-30 NOTE — PROGRESS NOTES
Occupational Therapy    Evaluation    Patient Name: Soledad Melendrez  MRN: [id number]  Department: [facility]  [facility]  Today's Date: 6/30/2025  Time Calculation  Start Time: 0955  Stop Time: 1019  Time Calculation (min): 24 min    Assessment  IP OT Assessment  OT Assessment: Pt. presents with a decline in self-care, mobility and safety and would benefit from skilled OT services to maximize independence and promote return to prior level of function.  Prognosis: Good  Barriers to Discharge Home: Physical needs  End of Session Communication: Bedside nurse  End of Session Patient Position: Bed, 3 rail up, Alarm off, caregiver present  Plan:  Treatment Interventions: ADL retraining, Functional transfer training, Endurance training, Compensatory technique education  OT Frequency: 3 times per week (during this acute inpatient hospitalization)  OT Discharge Recommendations: Moderate intensity level of continued care (Based on current functional status and rehab potential, patient is anticipated to tolerate and benefit from 5 or more days per week of skilled rehabilitative therapy after discharge from this acute inpatient hospitalization.)  OT Recommended Transfer Status: Assist of 2  OT - OK to Discharge: Yes (to next level of care when cleared by medical team)    Subjective   Current Problem:  1. Intracranial bleed (Multi)        2. Fall, initial encounter        3. Anticoagulated on Coumadin        4. Closed displaced fracture of acromial end of right clavicle, initial encounter          OT Visit Info:  OT Received On: 06/30/25  General Visit Info:  General  Reason for Referral: impaired adl; pt. admitted with fall, tachypnea/tachycardia, xray:  slightly displaced distal R calvicular fx-non op, nwb RUE with sling x 6 weeks, ct head;  ich of occipital horn, atrium, body of R lateral ventricle  Referred By: John  Past Medical History Relevant to Rehab: a fib, dementia, dm, cad, cva, gerd, htn, hld,  "hypothyroidism, aaa, carotid endarterectomy, mitral valve regurg, home o2  Family/Caregiver Present: Yes  Caregiver Feedback: granddaughter and son in law  Co-Treatment: PT  Co-Treatment Reason: to maximize patient safety/abilities  Prior to Session Communication: Bedside nurse  Patient Position Received: Bed, 3 rail up, Alarm off, caregiver present  General Comment: pt. agreeable to therapy intervention  Precautions:  Precautions Comment: VSS, tele, o2 3 L/min, speaks Luxembourgish and some English, granddaughter translating, nwcaitlyn JUÁREZ with sling x 6 weeks     Date/Time Vitals Session Patient Position Pulse Resp SpO2 BP MAP (mmHg)    06/30/25 1200 --  --  79  19  99 %  129/69  96     06/30/25 1300 --  --  74  14  99 %  145/79  105                Pain:  Pain Assessment  Pain Assessment: 0-10  0-10 (Numeric) Pain Score:  (pt. reports a mild to moderate amount of R shoulder pain, repositioned for comfort at conclusion of session)    Objective   Cognition:  Overall Cognitive Status:  (pt. oriented to self/place, follows commands with translation)           Home Living:  Home Living Comments: pt. lives with duarteuther and son in law, 1 floor home, 1+1+3 entry step without rail (will be obtaining soon per granddaughter), tub/shower with chair, gb x 1, RTS with 3 in 1 commode seat over, ownd st. cane, and rollator   Prior Function:  Prior Function Comments: pt. can spongebathe independently, assist from daughter for showers, assist with dressing, use of rollator most of the time, use of st. cane on steps or when \"feeling better\", family completes all iadl/driving  IADL History:     ADL:  ADL Comments: would estimate dependent assist for LB bathe/dress/toileting, max assist for UB, min assist for grooming, set up for feeding with L hand, pt. is R handed  Activity Tolerance:  Endurance: Decreased tolerance for upright activites  Early Mobility/Exercise Safety Screen: Proceed with mobilization - No exclusion criteria met  Bed " Mobility/Transfers: Bed Mobility  Bed Mobility:  (mod assist x 1 supine to sit, mod assist x 2 sit to supine)    Transfers  Transfer:  (sit<> stand from eob:  mod assist x 2)      Functional Mobility:  Functional Mobility  Functional Mobility Performed:  (mobility to sidestep/forward/backward, hand held assist mod assist x 2, retropulsive)  Sensation:  Sensation Comment: sensation intact  Strength:  Strength Comments: ALYSSAE wfl for age, RUE not tested     Coordination:  Movements are Fluid and Coordinated: Yes   Outcome Measures: [facility] Daily Activity  Putting on and taking off regular lower body clothing: Total  Bathing (including washing, rinsing, drying): Total  Putting on and taking off regular upper body clothing: A lot  Toileting, which includes using toilet, bedpan or urinal: Total  Taking care of personal grooming such as brushing teeth: A little  Eating Meals: A little  Daily Activity - Total Score: 11         and Early Mobility/Exercise Safety Screen: Proceed with mobilization - No exclusion criteria met  ICU Mobility Scale: Marching on spot (at bedside) [6]  Education Documentation  Precautions, taught by Isabell Stewart OT at 6/30/2025  1:13 PM.  Learner: Family, Patient  Readiness: Acceptance  Method: Explanation  Response: Verbalizes Understanding, Needs Reinforcement  Comment: OT POC with translation    ADL Training, taught by Isabell Stewart OT at 6/30/2025  1:13 PM.  Learner: Family, Patient  Readiness: Acceptance  Method: Explanation  Response: Verbalizes Understanding, Needs Reinforcement  Comment: OT POC with translation        Goals:   Encounter Problems       Encounter Problems (Active)       OT Goals       Increase functional mobility and  functional transfers to min assist x 1 for bed/chair/toilet/shower/car with dme prn   (Progressing)       Start:  06/30/25    Expected End:  07/14/25            increase Lue ther ex/activity (including R hand/wrist) x 7-10 minutes and increase standing tolerance x  3-5 minutes to promote greater activity tolerance for assist with adl.   (Progressing)       Start:  06/30/25    Expected End:  07/14/25            Increase ub/lb dressing to min assist x 1 with dme prn  (Progressing)       Start:  06/30/25    Expected End:  07/14/25            Increase toileting to min assist x 1 with dme prn  (Progressing)       Start:  06/30/25    Expected End:  07/14/25            pt. will apply safety techniques and adhere to nwb RUE for safety with all mobility/transfers/adl  (Progressing)       Start:  06/30/25    Expected End:  07/14/25

## 2025-06-30 NOTE — CARE PLAN
Problem: General Stroke  Goal: Establish a mutual long term goal with patient by discharge  Outcome: Progressing  Goal: Demonstrate improvement in neurological exam throughout the shift  Outcome: Progressing  Goal: Maintain BP within ordered limits throughout shift  Outcome: Progressing  Goal: Participate in treatment (ie., meds, therapy) throughout shift  Outcome: Progressing  Goal: No symptoms of aspiration throughout shift  Outcome: Progressing  Goal: No symptoms of hemorrhage throughout shift  Outcome: Progressing  Goal: Tolerate enteral feeding throughout shift  Outcome: Progressing  Goal: Decreased nausea/vomiting throughout shift  Outcome: Progressing  Goal: Controlled blood glucose throughout shift  Outcome: Progressing  Goal: Out of bed three times today  Outcome: Progressing     Problem: ICU Stroke  Goal: Maintain ICP within ordered limits throughout shift  Outcome: Progressing  Goal: Tolerate EVD clamping trial throughout shift  Outcome: Progressing  Goal: Tolerate ventilator weaning trial during shift  Outcome: Progressing  Goal: Maintain patent airway throughout shift  Outcome: Progressing  Goal: Achieve/maintain targeted sodium level throughout shift  Outcome: Progressing     Problem: Pain - Adult  Goal: Verbalizes/displays adequate comfort level or baseline comfort level  Outcome: Progressing     Problem: Safety - Adult  Goal: Free from fall injury  Outcome: Progressing     Problem: Discharge Planning  Goal: Discharge to home or other facility with appropriate resources  Outcome: Progressing     Problem: Chronic Conditions and Co-morbidities  Goal: Patient's chronic conditions and co-morbidity symptoms are monitored and maintained or improved  Outcome: Progressing     Problem: Nutrition  Goal: Nutrient intake appropriate for maintaining nutritional needs  Outcome: Progressing     Problem: Skin  Goal: Decreased wound size/increased tissue granulation at next dressing change  Outcome:  Progressing  Goal: Participates in plan/prevention/treatment measures  Outcome: Progressing  Goal: Prevent/manage excess moisture  Outcome: Progressing  Goal: Prevent/minimize sheer/friction injuries  Outcome: Progressing  Goal: Promote/optimize nutrition  Outcome: Progressing  Goal: Promote skin healing  Outcome: Progressing     Problem: Diabetes  Goal: Achieve decreasing blood glucose levels by end of shift  Outcome: Progressing  Goal: Increase stability of blood glucose readings by end of shift  Outcome: Progressing  Goal: Maintain electrolyte levels within acceptable range throughout shift  Outcome: Progressing  Goal: Maintain glucose levels >70mg/dl to <250mg/dl throughout shift  Outcome: Progressing  Goal: No changes in neurological exam by end of shift  Outcome: Progressing  Goal: Learn about and adhere to nutrition recommendations by end of shift  Outcome: Progressing  Goal: Vital signs within normal range for age by end of shift  Outcome: Progressing  Goal: Increase self care and/or family involovement by end of shift  Outcome: Progressing     The patient's goals for the shift include  comfort and rest    The clinical goals for the shift include Pt will remain hemodynamically stable

## 2025-07-01 ENCOUNTER — DOCUMENTATION (OUTPATIENT)
Dept: HOME HEALTH SERVICES | Facility: HOME HEALTH | Age: OVER 89
End: 2025-07-01
Payer: COMMERCIAL

## 2025-07-01 VITALS
DIASTOLIC BLOOD PRESSURE: 63 MMHG | SYSTOLIC BLOOD PRESSURE: 123 MMHG | OXYGEN SATURATION: 96 % | RESPIRATION RATE: 17 BRPM | TEMPERATURE: 96.6 F | HEART RATE: 71 BPM | BODY MASS INDEX: 20.59 KG/M2 | HEIGHT: 63 IN | WEIGHT: 116.18 LBS

## 2025-07-01 PROBLEM — I62.9 INTRACRANIAL BLEED (MULTI): Status: RESOLVED | Noted: 2025-06-28 | Resolved: 2025-07-01

## 2025-07-01 LAB
APTT PPP: 20 SECONDS (ref 26–36)
ERYTHROCYTE [DISTWIDTH] IN BLOOD BY AUTOMATED COUNT: 14.7 % (ref 11.5–14.5)
GLUCOSE BLD MANUAL STRIP-MCNC: 119 MG/DL (ref 74–99)
GLUCOSE BLD MANUAL STRIP-MCNC: 159 MG/DL (ref 74–99)
HCT VFR BLD AUTO: 41.8 % (ref 36–46)
HGB BLD-MCNC: 13.4 G/DL (ref 12–16)
HOLD SPECIMEN: NORMAL
INR PPP: 1.4 (ref 0.9–1.1)
MCH RBC QN AUTO: 33.7 PG (ref 26–34)
MCHC RBC AUTO-ENTMCNC: 32.1 G/DL (ref 32–36)
MCV RBC AUTO: 105 FL (ref 80–100)
NRBC BLD-RTO: 0 /100 WBCS (ref 0–0)
PLATELET # BLD AUTO: 118 X10*3/UL (ref 150–450)
PROTHROMBIN TIME: 15.2 SECONDS (ref 9.8–12.4)
RBC # BLD AUTO: 3.98 X10*6/UL (ref 4–5.2)
WBC # BLD AUTO: 6.8 X10*3/UL (ref 4.4–11.3)

## 2025-07-01 PROCEDURE — 82947 ASSAY GLUCOSE BLOOD QUANT: CPT

## 2025-07-01 PROCEDURE — 2500000004 HC RX 250 GENERAL PHARMACY W/ HCPCS (ALT 636 FOR OP/ED)

## 2025-07-01 PROCEDURE — 2500000001 HC RX 250 WO HCPCS SELF ADMINISTERED DRUGS (ALT 637 FOR MEDICARE OP)

## 2025-07-01 PROCEDURE — 99238 HOSP IP/OBS DSCHRG MGMT 30/<: CPT | Performed by: REGISTERED NURSE

## 2025-07-01 PROCEDURE — 36415 COLL VENOUS BLD VENIPUNCTURE: CPT | Performed by: REGISTERED NURSE

## 2025-07-01 PROCEDURE — 85027 COMPLETE CBC AUTOMATED: CPT | Performed by: NURSE PRACTITIONER

## 2025-07-01 PROCEDURE — 36415 COLL VENOUS BLD VENIPUNCTURE: CPT | Performed by: NURSE PRACTITIONER

## 2025-07-01 PROCEDURE — 2500000005 HC RX 250 GENERAL PHARMACY W/O HCPCS

## 2025-07-01 PROCEDURE — 85610 PROTHROMBIN TIME: CPT | Performed by: REGISTERED NURSE

## 2025-07-01 PROCEDURE — 99232 SBSQ HOSP IP/OBS MODERATE 35: CPT | Performed by: STUDENT IN AN ORGANIZED HEALTH CARE EDUCATION/TRAINING PROGRAM

## 2025-07-01 PROCEDURE — 2500000002 HC RX 250 W HCPCS SELF ADMINISTERED DRUGS (ALT 637 FOR MEDICARE OP, ALT 636 FOR OP/ED)

## 2025-07-01 RX ADMIN — GABAPENTIN 100 MG: 100 CAPSULE ORAL at 09:06

## 2025-07-01 RX ADMIN — FUROSEMIDE 40 MG: 40 TABLET ORAL at 09:06

## 2025-07-01 RX ADMIN — PANTOPRAZOLE SODIUM 20 MG: 20 TABLET, DELAYED RELEASE ORAL at 05:36

## 2025-07-01 RX ADMIN — METOPROLOL TARTRATE 25 MG: 25 TABLET, FILM COATED ORAL at 09:06

## 2025-07-01 RX ADMIN — ATORVASTATIN CALCIUM 20 MG: 20 TABLET, FILM COATED ORAL at 09:06

## 2025-07-01 RX ADMIN — ACETAMINOPHEN 975 MG: 325 TABLET ORAL at 15:33

## 2025-07-01 RX ADMIN — LEVOTHYROXINE SODIUM 50 MCG: 0.05 TABLET ORAL at 05:36

## 2025-07-01 RX ADMIN — ACETAMINOPHEN 975 MG: 325 TABLET ORAL at 09:06

## 2025-07-01 RX ADMIN — POLYETHYLENE GLYCOL 3350 17 G: 17 POWDER, FOR SOLUTION ORAL at 09:06

## 2025-07-01 RX ADMIN — INSULIN LISPRO 1 UNITS: 100 INJECTION, SOLUTION INTRAVENOUS; SUBCUTANEOUS at 12:35

## 2025-07-01 RX ADMIN — ESCITALOPRAM OXALATE 5 MG: 10 TABLET ORAL at 09:06

## 2025-07-01 RX ADMIN — LIDOCAINE 1 PATCH: 4 PATCH TOPICAL at 09:07

## 2025-07-01 ASSESSMENT — PAIN SCALES - GENERAL
PAINLEVEL_OUTOF10: 0 - NO PAIN
PAINLEVEL_OUTOF10: 0 - NO PAIN

## 2025-07-01 ASSESSMENT — PAIN - FUNCTIONAL ASSESSMENT
PAIN_FUNCTIONAL_ASSESSMENT: 0-10
PAIN_FUNCTIONAL_ASSESSMENT: 0-10

## 2025-07-01 NOTE — CARE PLAN
The patient's goals for the shift include      The clinical goals for the shift include Pt will remain hemodynamically stable    Problem: General Stroke  Goal: Establish a mutual long term goal with patient by discharge  Outcome: Progressing  Goal: Demonstrate improvement in neurological exam throughout the shift  Outcome: Progressing  Goal: Maintain BP within ordered limits throughout shift  Outcome: Progressing  Goal: Participate in treatment (ie., meds, therapy) throughout shift  Outcome: Progressing  Goal: No symptoms of aspiration throughout shift  Outcome: Progressing  Goal: No symptoms of hemorrhage throughout shift  Outcome: Progressing  Goal: Tolerate enteral feeding throughout shift  Outcome: Progressing  Goal: Decreased nausea/vomiting throughout shift  Outcome: Progressing  Goal: Controlled blood glucose throughout shift  Outcome: Progressing  Goal: Out of bed three times today  Outcome: Progressing     Problem: ICU Stroke  Goal: Maintain ICP within ordered limits throughout shift  Outcome: Progressing  Goal: Tolerate EVD clamping trial throughout shift  Outcome: Progressing  Goal: Tolerate ventilator weaning trial during shift  Outcome: Progressing  Goal: Maintain patent airway throughout shift  Outcome: Progressing  Goal: Achieve/maintain targeted sodium level throughout shift  Outcome: Progressing     Problem: Pain - Adult  Goal: Verbalizes/displays adequate comfort level or baseline comfort level  Outcome: Progressing     Problem: Safety - Adult  Goal: Free from fall injury  Outcome: Progressing     Problem: Discharge Planning  Goal: Discharge to home or other facility with appropriate resources  Outcome: Progressing     Problem: Chronic Conditions and Co-morbidities  Goal: Patient's chronic conditions and co-morbidity symptoms are monitored and maintained or improved  Outcome: Progressing     Problem: Nutrition  Goal: Nutrient intake appropriate for maintaining nutritional needs  Outcome:  Progressing     Problem: Skin  Goal: Decreased wound size/increased tissue granulation at next dressing change  Outcome: Progressing  Goal: Participates in plan/prevention/treatment measures  Outcome: Progressing  Goal: Prevent/manage excess moisture  Outcome: Progressing  Goal: Prevent/minimize sheer/friction injuries  Outcome: Progressing  Goal: Promote/optimize nutrition  Outcome: Progressing  Goal: Promote skin healing  Outcome: Progressing     Problem: Diabetes  Goal: Achieve decreasing blood glucose levels by end of shift  Outcome: Progressing  Goal: Increase stability of blood glucose readings by end of shift  Outcome: Progressing  Goal: Maintain electrolyte levels within acceptable range throughout shift  Outcome: Progressing  Goal: Maintain glucose levels >70mg/dl to <250mg/dl throughout shift  Outcome: Progressing  Goal: No changes in neurological exam by end of shift  Outcome: Progressing  Goal: Learn about and adhere to nutrition recommendations by end of shift  Outcome: Progressing  Goal: Vital signs within normal range for age by end of shift  Outcome: Progressing  Goal: Increase self care and/or family involovement by end of shift  Outcome: Progressing     Problem: Fall/Injury  Goal: Not fall by end of shift  Outcome: Progressing  Goal: Be free from injury by end of the shift  Outcome: Progressing  Goal: Verbalize understanding of personal risk factors for fall in the hospital  Outcome: Progressing  Goal: Verbalize understanding of risk factor reduction measures to prevent injury from fall in the home  Outcome: Progressing  Goal: Use assistive devices by end of the shift  Outcome: Progressing  Goal: Pace activities to prevent fatigue by end of the shift  Outcome: Progressing

## 2025-07-01 NOTE — TREATMENT PLAN
Patient seen and examined. Chart reviewed where relevant. Discussed findings and clinical plan with note author. Full note to follow.    Headache resolved. Patient and family refused SNF. Awaiting C arrangements. Medically ready for discharge.    Timbo Ibarra MD, PhD  Available via [employer]

## 2025-07-01 NOTE — DISCHARGE SUMMARY
Discharge Diagnosis  Intracranial bleed (Multi)    Issues Requiring Follow-Up  Follow up with Neurosurgery after completing repeat head CT in 2 weeks.  Follow up with Orthopedic surgery in 3 weeks for clavicle fracture.    Test Results Pending At Discharge  Pending Labs       Order Current Status    Extra Tubes Preliminary result    Lavender Top Preliminary result    PST Top Preliminary result            Hospital Course   Soledad Melendrez is a 89 y.o. female with a PMH of T2DM, AAA, A fib on Coumadin, CAD, HLD, HTN, mitral/aortic/tricuspid valve regurg, hypothyroidism, and GERD who was BIBA from home on 6/28 due to a fall.  ED course revealed tachypnea and tachycardia. Remaining vitals HDS. YRN. CT head revealed intracranail hemorrhage of occipital horn, atrium, and body of right lateral ventricle. XR R shoulder revealed slightly displaced distal R clavicular fracture. CT of lumbar/thoracic/cervical spine and C/A/P all revealed no further acute findings.   Patient received 2.5mg IV Vitamin K as well as 1u FFP for Warfarin reversal. Her INR on admission was 2.7.  Admitted to ICU under trauma service.  6 hour repeat head CT demonstrated slight increase in R ventricle hemorrhage with new trace left lateral ventricle hemorrhage; however, the third and final CT head demonstrated stability. No surgical intervention per Neurosurgery consult. 2 week head CT post discharge ordered. Instructed patient NOT to resume anti coagulation.     Orthopedic surgery recommended non weight bearing RUE in sling and 3 week outpatient follow up with repeat Xrays.     CT C/A/P obtained for trauma evaluation. No traumatic injuries; however, patient with incidental findings of aneurysmal dilation of infrarenal abdominal aorta (33mm). Patient instructed to follow up with PCP.    On day of discharge all vital signs, laboratory data, and physical exam findings were reviewed and patient was deemed appropriate for hospital discharge. At the  time of discharge, patient's pain was controlled with oral analgesia, patient was urinating, having BMs, sleeping, and eating well. PT/OT's recommendations were for moderate intensity therapy after discharge. Her AM-PAC score qualified her for SNF placement; however, family declined. Patient was discharged home with new orders for HHC (RN, PT/OT, and aide). No new prescriptions were required. Follow up appointment recommendations were made.   Discharge plan was discussed with the patient as well as her grandchildren. All of their questions were answered.           Visit Vitals  /87   Pulse 78   Temp 35.9 °C (96.6 °F) (Temporal)   Resp (!) 34     Vitals:    06/29/25 0400   Weight: 52.7 kg (116 lb 2.9 oz)       Immunization History   Administered Date(s) Administered    Flu vaccine (IIV4), preservative free *Check age/dose* 11/16/2021    Flu vaccine, quadrivalent, high-dose, preservative free, age 65y+ (FLUZONE) 10/05/2022    Flu vaccine, trivalent, preservative free, HIGH-DOSE, age 65y+ (Fluzone) 09/08/2018    Influenza, injectable, quadrivalent, preservative free, pediatric 12/18/2019, 09/25/2020    Influenza, live, intranasal, quadrivalent 10/04/2013    Moderna COVID-19 vaccine, 12 years and older (50mcg/0.5mL)(Spikevax) 11/02/2023, 09/30/2024    Moderna COVID-19 vaccine, bivalent, blue cap/gray label *Check age/dose* 10/17/2022    Moderna SARS-CoV-2 Vaccination 01/17/2021, 02/25/2021, 10/24/2021, 04/09/2022, 10/07/2022    Pneumococcal conjugate vaccine, 13-valent (PREVNAR 13) 02/27/2018    Pneumococcal polysaccharide vaccine, 23-valent, age 2 years and older (PNEUMOVAX 23) 10/04/2013, 12/21/2020    Zoster vaccine, recombinant, adult (SHINGRIX) 04/26/2018, 07/25/2018    Zoster, live 03/10/2010       Results  Results for orders placed or performed during the hospital encounter of 06/28/25 (from the past 24 hours)   POCT GLUCOSE   Result Value Ref Range    POCT Glucose 110 (H) 74 - 99 mg/dL   SST TOP   Result  Value Ref Range    Extra Tube Hold for add-ons.    Protime-INR   Result Value Ref Range    Protime 17.8 (H) 9.8 - 12.4 seconds    INR 1.6 (H) 0.9 - 1.1   POCT GLUCOSE   Result Value Ref Range    POCT Glucose 114 (H) 74 - 99 mg/dL   CBC   Result Value Ref Range    WBC 6.8 4.4 - 11.3 x10*3/uL    nRBC 0.0 0.0 - 0.0 /100 WBCs    RBC 3.98 (L) 4.00 - 5.20 x10*6/uL    Hemoglobin 13.4 12.0 - 16.0 g/dL    Hematocrit 41.8 36.0 - 46.0 %     (H) 80 - 100 fL    MCH 33.7 26.0 - 34.0 pg    MCHC 32.1 32.0 - 36.0 g/dL    RDW 14.7 (H) 11.5 - 14.5 %    Platelets 118 (L) 150 - 450 x10*3/uL   POCT GLUCOSE   Result Value Ref Range    POCT Glucose 119 (H) 74 - 99 mg/dL   Coagulation Screen   Result Value Ref Range    Protime 15.2 (H) 9.8 - 12.4 seconds    INR 1.4 (H) 0.9 - 1.1    aPTT 20 (L) 26 - 36 seconds   Lavender Top   Result Value Ref Range    Extra Tube Hold for add-ons.    PST Top   Result Value Ref Range    Extra Tube Hold for add-ons.    POCT GLUCOSE   Result Value Ref Range    POCT Glucose 159 (H) 74 - 99 mg/dL           Pertinent Physical Exam At Time of Discharge  Vitals reviewed.   Constitutional:       General: She is not in acute distress.     Appearance: Normal appearance.   HENT:      Head: Normocephalic.      Nose:      Comments: NC in nares - weaned to 2L  Eyes:      Extraocular Movements: Extraocular movements intact.   Cardiovascular:      Rate and Rhythm: Normal rate and regular rhythm.   Pulmonary:      Effort: Pulmonary effort is normal. No respiratory distress.      Comments: 2L via NC  Abdominal:      General: Bowel sounds are normal.      Palpations: Abdomen is soft.      Tenderness: There is no abdominal tenderness.   Musculoskeletal:      Comments: R shoulder tender- in sling. Nontender to R elbow or hand. Sensation intact throughout all extremities. Extensive ecchymosis in various stages to right shoulder/upper arm  Skin:     General: Skin is warm and dry.   Neurological:      Mental Status: She is  alert. Mental status is at baseline.   Psychiatric:         Mood and Affect: Mood normal.         Behavior: Behavior normal.     Home Medications     Medication List      CONTINUE taking these medications     * albuterol 90 mcg/actuation inhaler   * albuterol 2.5 mg/0.5 mL nebulizer solution   atorvastatin 20 mg tablet; Commonly known as: Lipitor   escitalopram 5 mg tablet; Commonly known as: Lexapro   fluticasone 50 mcg/actuation nasal spray; Commonly known as: Flonase   furosemide 20 mg tablet; Commonly known as: Lasix; Take 2 tablets (40   mg) by mouth once daily in the morning AND 2 tablets (40 mg) once daily in   the evening.   Jardiance 10 mg tablet; Generic drug: empagliflozin   levothyroxine 50 mcg tablet; Commonly known as: Synthroid, Levoxyl   metoprolol tartrate 25 mg tablet; Commonly known as: Lopressor   nitroglycerin 0.4 mg SL tablet; Commonly known as: Nitrostat   omeprazole 20 mg DR capsule; Commonly known as: PriLOSEC   polyethylene glycol 17 gram/dose powder; Commonly known as: Glycolax,   Miralax   spironolactone 25 mg tablet; Commonly known as: Aldactone   Tylenol Arthritis Pain 650 mg ER tablet; Generic drug: acetaminophen  * This list has 2 medication(s) that are the same as other medications   prescribed for you. Read the directions carefully, and ask your doctor or   other care provider to review them with you.     STOP taking these medications     warfarin 2.5 mg tablet; Commonly known as: Coumadin       Outpatient Follow-Up  Future Appointments   Date Time Provider Department Center   7/22/2025 11:15 AM Calin Hernandez MD [id number] [place]       ESTEFANIA Chery-CNP

## 2025-07-01 NOTE — PROGRESS NOTES
Soledad Melendrez is a 89 y.o. female on day 3 of admission presenting with Intracranial bleed (Multi).  Record reviewed.  TCC received message from attending provider, who, after speaking with grandson-in-law , indicated preference is [facility].  Discharge order written.  IMM letter explained to granddaughter and grandson-in-law over the phone.  TCC given permission to authenticate IMM and leave printed copy at bedside.  PCP is Dr. Harriet Up RN BSN, [facility]    3:25 pm addendum  List of private duty agencies and Senior Living Guide given to patient's grandson-in-law.  Family appreciative. Care Transitions will continue to follow.

## 2025-07-01 NOTE — PROGRESS NOTES
07/01/25 1520   Discharge Planning   Assistance Needed SW met with pt ovidio and her  to discuss discharge needs. They are interested in a home health aide for pt. Pt has [employer] and a care manager. Family already called and left message for care manager. SW also provided family with brochure for [facility] on Aging Passport program. They have an appointment with [facility] tomorrow. Denies any further SW needs.

## 2025-07-01 NOTE — CARE PLAN
The patient's goals for the shift include      The clinical goals for the shift include Pt will remain hemodynamically stable    Problem: General Stroke  Goal: Establish a mutual long term goal with patient by discharge  7/1/2025 0112 by Kevyn Arceo RN  Outcome: Progressing  7/1/2025 0111 by Kevyn Arceo RN  Outcome: Progressing  Goal: Demonstrate improvement in neurological exam throughout the shift  7/1/2025 0112 by Kevyn Arceo RN  Outcome: Progressing  7/1/2025 0111 by Kevyn Arceo RN  Outcome: Progressing  Goal: Maintain BP within ordered limits throughout shift  7/1/2025 0112 by Kevyn Arceo RN  Outcome: Progressing  7/1/2025 0111 by Kevyn Arceo RN  Outcome: Progressing  Goal: Participate in treatment (ie., meds, therapy) throughout shift  7/1/2025 0112 by Kevyn Arceo RN  Outcome: Progressing  7/1/2025 0111 by Kevyn Arceo RN  Outcome: Progressing  Goal: No symptoms of aspiration throughout shift  7/1/2025 0112 by Kevyn Arceo RN  Outcome: Progressing  7/1/2025 0111 by Kevyn Arceo RN  Outcome: Progressing  Goal: No symptoms of hemorrhage throughout shift  7/1/2025 0112 by Kevyn Arceo RN  Outcome: Progressing  7/1/2025 0111 by Kevyn Arceo RN  Outcome: Progressing  Goal: Tolerate enteral feeding throughout shift  7/1/2025 0112 by Kevyn Arceo RN  Outcome: Progressing  7/1/2025 0111 by Kevyn Arceo RN  Outcome: Progressing  Goal: Decreased nausea/vomiting throughout shift  7/1/2025 0112 by Kevyn Arceo RN  Outcome: Progressing  7/1/2025 0111 by Kevyn Arceo RN  Outcome: Progressing  Goal: Controlled blood glucose throughout shift  7/1/2025 0112 by Kevyn Arceo RN  Outcome: Progressing  7/1/2025 0111 by Kevyn Arceo RN  Outcome: Progressing  Goal: Out of bed three times today  7/1/2025 0112 by Kevyn Arceo RN  Outcome: Progressing  7/1/2025 0111 by Kevyn Arceo RN  Outcome: Progressing     Problem: ICU Stroke  Goal: Maintain ICP within ordered limits throughout  shift  7/1/2025 0112 by Kevyn Arceo RN  Outcome: Progressing  7/1/2025 0111 by Kevyn Arceo RN  Outcome: Progressing  Goal: Tolerate EVD clamping trial throughout shift  7/1/2025 0112 by Kevyn Arceo RN  Outcome: Progressing  7/1/2025 0111 by Kevyn Arceo RN  Outcome: Progressing  Goal: Tolerate ventilator weaning trial during shift  7/1/2025 0112 by Kevyn Arceo RN  Outcome: Progressing  7/1/2025 0111 by Kevyn Arcoe RN  Outcome: Progressing  Goal: Maintain patent airway throughout shift  7/1/2025 0112 by Kevyn Arceo RN  Outcome: Progressing  7/1/2025 0111 by Kevyn Arceo RN  Outcome: Progressing  Goal: Achieve/maintain targeted sodium level throughout shift  7/1/2025 0112 by Kevyn Arceo RN  Outcome: Progressing  7/1/2025 0111 by Kevyn Arceo RN  Outcome: Progressing     Problem: Pain - Adult  Goal: Verbalizes/displays adequate comfort level or baseline comfort level  7/1/2025 0112 by Kevyn Arceo RN  Outcome: Progressing  7/1/2025 0111 by Kevyn Arceo RN  Outcome: Progressing     Problem: Safety - Adult  Goal: Free from fall injury  7/1/2025 0112 by Kevyn Arceo RN  Outcome: Progressing  7/1/2025 0111 by Kevyn Arceo RN  Outcome: Progressing     Problem: Discharge Planning  Goal: Discharge to home or other facility with appropriate resources  7/1/2025 0112 by Kevyn Arceo RN  Outcome: Progressing  7/1/2025 0111 by Kevyn Arceo RN  Outcome: Progressing     Problem: Chronic Conditions and Co-morbidities  Goal: Patient's chronic conditions and co-morbidity symptoms are monitored and maintained or improved  7/1/2025 0112 by Kevyn Arceo RN  Outcome: Progressing  7/1/2025 0111 by Kevyn Arceo RN  Outcome: Progressing     Problem: Nutrition  Goal: Nutrient intake appropriate for maintaining nutritional needs  7/1/2025 0112 by Kevyn Arceo RN  Outcome: Progressing  7/1/2025 0111 by Kevyn Arceo RN  Outcome: Progressing     Problem: Skin  Goal: Decreased wound size/increased  tissue granulation at next dressing change  7/1/2025 0112 by Kevyn Arceo RN  Outcome: Progressing  Flowsheets (Taken 7/1/2025 0112)  Decreased wound size/increased tissue granulation at next dressing change:   Protective dressings over bony prominences   Utilize specialty bed per algorithm  7/1/2025 0111 by Kevyn Arceo RN  Outcome: Progressing  Goal: Participates in plan/prevention/treatment measures  7/1/2025 0112 by Kevyn Arceo RN  Outcome: Progressing  7/1/2025 0111 by Kevyn Arceo RN  Outcome: Progressing  Goal: Prevent/manage excess moisture  7/1/2025 0112 by Kevyn Arceo RN  Outcome: Progressing  7/1/2025 0111 by Kevyn Arceo RN  Outcome: Progressing  Goal: Prevent/minimize sheer/friction injuries  7/1/2025 0112 by Kevyn Arceo RN  Outcome: Progressing  7/1/2025 0111 by Kevyn Arceo RN  Outcome: Progressing  Goal: Promote/optimize nutrition  7/1/2025 0112 by Kevyn Arceo RN  Outcome: Progressing  7/1/2025 0111 by Kevyn Arceo RN  Outcome: Progressing  Goal: Promote skin healing  7/1/2025 0112 by Kevyn Arceo RN  Outcome: Progressing  7/1/2025 0111 by Kevyn Arceo RN  Outcome: Progressing     Problem: Diabetes  Goal: Achieve decreasing blood glucose levels by end of shift  7/1/2025 0112 by Kevyn Arceo RN  Outcome: Progressing  7/1/2025 0111 by Kevyn Arceo RN  Outcome: Progressing  Goal: Increase stability of blood glucose readings by end of shift  7/1/2025 0112 by Kevyn Arceo RN  Outcome: Progressing  7/1/2025 0111 by Kevyn Arceo RN  Outcome: Progressing  Goal: Maintain electrolyte levels within acceptable range throughout shift  7/1/2025 0112 by Kevyn Arceo RN  Outcome: Progressing  7/1/2025 0111 by Kevyn Arceo RN  Outcome: Progressing  Goal: Maintain glucose levels >70mg/dl to <250mg/dl throughout shift  7/1/2025 0112 by Kevyn Arceo RN  Outcome: Progressing  7/1/2025 0111 by Kevyn Arceo RN  Outcome: Progressing  Goal: No changes in neurological exam by end of  shift  7/1/2025 0112 by Kevyn Arceo RN  Outcome: Progressing  7/1/2025 0111 by Kevyn Arceo RN  Outcome: Progressing  Goal: Learn about and adhere to nutrition recommendations by end of shift  7/1/2025 0112 by Kevyn Arceo RN  Outcome: Progressing  7/1/2025 0111 by Kevyn Arceo RN  Outcome: Progressing  Goal: Vital signs within normal range for age by end of shift  7/1/2025 0112 by Kevyn Arceo RN  Outcome: Progressing  7/1/2025 0111 by Kevyn Arceo RN  Outcome: Progressing  Goal: Increase self care and/or family involovement by end of shift  7/1/2025 0112 by Kevyn Arceo RN  Outcome: Progressing  7/1/2025 0111 by Kevyn Arceo RN  Outcome: Progressing     Problem: Fall/Injury  Goal: Not fall by end of shift  7/1/2025 0112 by Kevyn Arceo RN  Outcome: Progressing  7/1/2025 0111 by Kevyn Arceo RN  Outcome: Progressing  Goal: Be free from injury by end of the shift  7/1/2025 0112 by Kevyn Arceo RN  Outcome: Progressing  7/1/2025 0111 by Kevyn Arceo RN  Outcome: Progressing  Goal: Verbalize understanding of personal risk factors for fall in the hospital  7/1/2025 0112 by Kevyn Arceo RN  Outcome: Progressing  7/1/2025 0111 by Kevyn Arceo RN  Outcome: Progressing  Goal: Verbalize understanding of risk factor reduction measures to prevent injury from fall in the home  7/1/2025 0112 by Kevyn Arceo RN  Outcome: Progressing  7/1/2025 0111 by Kevyn Arceo RN  Outcome: Progressing  Goal: Use assistive devices by end of the shift  7/1/2025 0112 by Kevyn Arceo RN  Outcome: Progressing  7/1/2025 0111 by Kevyn Arceo RN  Outcome: Progressing  Goal: Pace activities to prevent fatigue by end of the shift  7/1/2025 0112 by Kevyn Arceo RN  Outcome: Progressing  7/1/2025 0111 by Kevyn Arceo RN  Outcome: Progressing

## 2025-07-01 NOTE — HH CARE COORDINATION
Home Care received a Referral for Nursing, Physical Therapy, Occupational Therapy, and Home Health Aide. We have processed the referral for a Start of Care on 7/2/20025.     If you have any questions or concerns, please feel free to contact us at [phone]. Follow the prompts, enter your five digit zip code, and you will be directed to your care team on WEST 3.

## 2025-07-01 NOTE — PROGRESS NOTES
Soledad Melendrez is a 89 y.o. female on day 3 of admission presenting with Intracranial bleed (Multi).      Subjective   Doing well, no complaints       Objective     Last Recorded Vitals  /87   Pulse 78   Temp 35.9 °C (96.6 °F) (Temporal)   Resp (!) 34   Wt 52.7 kg (116 lb 2.9 oz)   SpO2 (!) 85%     Physical Exam    G: aox3, NAD, cooperative  HENT: neck supple, no JVD  Eyes: clear sclera  CV: irregularly irregular, rate controlled s1 s2  L: clear  Abd: soft, NT, non distended  Ext: no c/c/e, R UE in sling  N: no appreciable acute focal deficits  Psych: appropriate mood and behavior   Assessment & Plan    Traumatic ICH  R minimally displaced distal clavicular fx    CKD III (last Cr 1.2 on 2/27)   HTN, HLD  Hypothyroidism  GERD, afib on Coumadin  CAD s/p CABG  HFpEF  AAA  T2DM  DVT ppx  DNAR/DNI    Plan:     Stable on last CT, neurosurgery following, holding coumadin, OP follow up, neuro checks per protocol, monitor hemodynamics closely, pain control  Ortho consulted, advised nonoperative tx, NWB to the RUE & sling for 6 weeks with OP follow up \  PT, OT  Continue home meds, aldactone & lasix have been resumed  [facility] 11, pt/family refused SNF, will be discharged with [facility], from medical standpoint ok with discharge, no medication changes besides holding coumadin           Davi Benavidez, DO

## 2025-07-01 NOTE — PROGRESS NOTES
Soledad Melendrez is a 89 y.o. female on day 3 of admission presenting with Intracranial bleed (Multi).  Granddaughter contacted [facility] for help obtaining [employer] at home.  Granddaughter asked if hospital is able to provide.  Hospital not able to provide at discharge.  Granddaughter updated with information to obtain privately.  Discharge plan is to return home with home care.  Awaiting choice from family.  Care Transitions will continue to follow.  Sonya Up RN BSN, [facility]

## 2025-07-02 LAB
HOLD SPECIMEN: NORMAL
HOLD SPECIMEN: NORMAL

## 2025-07-03 ENCOUNTER — HOME CARE VISIT (OUTPATIENT)
Dept: HOME HEALTH SERVICES | Facility: HOME HEALTH | Age: OVER 89
End: 2025-07-03
Payer: COMMERCIAL

## 2025-07-03 ENCOUNTER — DOCUMENTATION (OUTPATIENT)
Dept: CRITICAL CARE MEDICINE | Facility: HOSPITAL | Age: OVER 89
End: 2025-07-03
Payer: COMMERCIAL

## 2025-07-03 PROCEDURE — G0299 HHS/HOSPICE OF RN EA 15 MIN: HCPCS | Mod: HHH

## 2025-07-03 PROCEDURE — 169592 NO-PAY CLAIM PROCEDURE

## 2025-07-03 PROCEDURE — G0151 HHCP-SERV OF PT,EA 15 MIN: HCPCS | Mod: HHH

## 2025-07-03 SDOH — HEALTH STABILITY: MENTAL HEALTH: SMOKING IN HOME: 0

## 2025-07-03 SDOH — ECONOMIC STABILITY: HOUSING INSECURITY: EVIDENCE OF SMOKING MATERIAL: 0

## 2025-07-03 ASSESSMENT — ENCOUNTER SYMPTOMS
PAIN: 1
OCCASIONAL FEELINGS OF UNSTEADINESS: 1
PERSON REPORTING PAIN: PATIENT
PAIN LOCATION: RIGHT SHOULDER
HIGHEST PAIN SEVERITY IN PAST 24 HOURS: 10/10
LOWEST PAIN SEVERITY IN PAST 24 HOURS: 0/10

## 2025-07-03 ASSESSMENT — ACTIVITIES OF DAILY LIVING (ADL): AMBULATION ASSISTANCE ON FLAT SURFACES: 1

## 2025-07-03 NOTE — NURSING NOTE
Attempted to call patient for follow up phone call after discharge from hospital. No answer, left voicemail with call back number.

## 2025-07-03 NOTE — Clinical Note
Family decline HHA at this time. Has private hired aide coming toassist pt with ADLs when family cannot.

## 2025-07-03 NOTE — HOME HEALTH
Soledad Melendrez is a 89 y.o. female with a PMH of T2DM, AAA, A fib on Coumadin, CAD, HLD, HTN, mitral/aortic/tricuspid valve regurg, hypothyroidism, and GERD who was BIBA from home on 6/28 due to a fall.    ED course revealed tachypnea and tachycardia. Remaining vitals HDS. YRN. CT head revealed intracranail hemorrhage of occipital horn, atrium, and body of right lateral ventricle. XR R shoulder revealed slightly displaced distal R clavicular fracture. CT of lumbar/thoracic/cervical spine and C/A/P all revealed no further acute findings.    prior to this she lives with her dtr and son in law ( have medical problems and help is limited but grand dtr helps ) in a ranch home where everything is on the first floor and there are 2 steps to exit .  prior to is she walked with a cane or walker , needed assist with dressing and bathing .  there is a langauge barrier but someone is always there to translate

## 2025-07-04 ENCOUNTER — HOME CARE VISIT (OUTPATIENT)
Dept: HOME HEALTH SERVICES | Facility: HOME HEALTH | Age: OVER 89
End: 2025-07-04
Payer: COMMERCIAL

## 2025-07-04 VITALS
DIASTOLIC BLOOD PRESSURE: 62 MMHG | TEMPERATURE: 97.7 F | SYSTOLIC BLOOD PRESSURE: 108 MMHG | OXYGEN SATURATION: 98 % | RESPIRATION RATE: 15 BRPM | HEART RATE: 73 BPM

## 2025-07-04 SDOH — ECONOMIC STABILITY: HOUSING INSECURITY: EVIDENCE OF SMOKING MATERIAL: 0

## 2025-07-04 SDOH — ECONOMIC STABILITY: FOOD INSECURITY: MEALS PER DAY: 2

## 2025-07-04 SDOH — HEALTH STABILITY: MENTAL HEALTH: SMOKING IN HOME: 0

## 2025-07-04 ASSESSMENT — ACTIVITIES OF DAILY LIVING (ADL)
USING THE TELPHONE: DEPENDENT
FEEDING ASSESSED: 1
GROOMING_CURRENT_FUNCTION: MAXIMUM ASSIST
TOILETING: 1
ENTERING_EXITING_HOME: TWO PERSON
OASIS_M1830: 06
DRESSING_LB_CURRENT_FUNCTION: MAXIMUM ASSIST
SHOPPING ASSESSED: 1
DRESSING_UB_CURRENT_FUNCTION: MAXIMUM ASSIST
TRANSPORTATION ASSESSED: 1
GROOMING ASSESSED: 1
FEEDING: MODERATE ASSIST
HOUSEKEEPING ASSESSED: 1
LAUNDRY: DEPENDENT
AMBULATION ASSISTANCE: 1
TRANSPORTATION: DEPENDENT
SHOPPING: DEPENDENT
TOILETING: ONE PERSON
ORAL_CARE_ASSESSED: 1
LAUNDRY ASSESSED: 1
PHYSICAL TRANSFERS ASSESSED: 1
CURRENT_FUNCTION: ONE PERSON
AMBULATION ASSISTANCE: ONE PERSON
BATHING_CURRENT_FUNCTION: ONE PERSON
PREPARING MEALS: DEPENDENT
LIGHT HOUSEKEEPING: DEPENDENT
ORAL_CARE_CURRENT_FUNCTION: NEEDS ASSISTANCE
TELEPHONE USE ASSESSED: 1
BATHING ASSESSED: 1

## 2025-07-04 ASSESSMENT — LIFESTYLE VARIABLES: SMOKING_STATUS: 0

## 2025-07-04 ASSESSMENT — ENCOUNTER SYMPTOMS
LAST BOWEL MOVEMENT: 67388
SHORTNESS OF BREATH: 1
DYSPNEA ACTIVITY LEVEL: AFTER AMBULATING MORE THAN 20 FT
BOWEL PATTERN NORMAL: 1
LOWEST PAIN SEVERITY IN PAST 24 HOURS: 0/10
OCCASIONAL FEELINGS OF UNSTEADINESS: 1
SUBJECTIVE PAIN PROGRESSION: WAXING AND WANING
PAIN LOCATION - PAIN FREQUENCY: INTERMITTENT
PAIN: 1
LOSS OF SENSATION IN FEET: 0
CHANGE IN APPETITE: UNCHANGED
PAIN LOCATION - PAIN SEVERITY: 9/10
STOOL FREQUENCY: LESS THAN DAILY
DEPRESSION: 0
HIGHEST PAIN SEVERITY IN PAST 24 HOURS: 10/10
APPETITE LEVEL: FAIR
PAIN SEVERITY GOAL: 0/10
LIMITED RANGE OF MOTION: 1
DESCRIPTION OF MEMORY LOSS: SHORT TERM
PAIN LOCATION - PAIN QUALITY: ACHES
PAIN LOCATION: RIGHT SHOULDER
HYPERTENSION: 1
DIZZINESS: 1
PERSON REPORTING PAIN: PATIENT
MUSCLE WEAKNESS: 1

## 2025-07-04 ASSESSMENT — PAIN SCALES - PAIN ASSESSMENT IN ADVANCED DEMENTIA (PAINAD)
FACIALEXPRESSION: 0 - SMILING OR INEXPRESSIVE.
CONSOLABILITY: 0
NEGVOCALIZATION: 0 - NONE.
NEGVOCALIZATION: 0
BODYLANGUAGE: 0 - RELAXED.
BREATHING: 0
CONSOLABILITY: 0 - NO NEED TO CONSOLE.
FACIALEXPRESSION: 0
BODYLANGUAGE: 0
TOTALSCORE: 0

## 2025-07-04 NOTE — HOME HEALTH
SOC completed. VSS. pain range 0-10/10 dependingon activity and movement. Non weight bearing rt arm, unable to use walker, assist of 1 for activity and all ambulation, transfers. Pt is cared for24/7 by family. Family is bringing in seperate helpers tooverlap time to help with care. Pt lives with daughter andvson in law, grand daughter is frequently present in home to assist.
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
## 2025-07-07 PROBLEM — I50.812 CHRONIC RIGHT-SIDED CHF (CONGESTIVE HEART FAILURE): Status: ACTIVE | Noted: 2025-02-27

## 2025-07-07 PROBLEM — H91.91 HEARING LOSS OF RIGHT EAR: Status: RESOLVED | Noted: 2023-12-01 | Resolved: 2025-07-07

## 2025-07-07 PROBLEM — U07.1 COVID-19 VIRUS INFECTION: Status: RESOLVED | Noted: 2023-12-01 | Resolved: 2025-07-07

## 2025-07-07 PROBLEM — I87.2 STASIS DERMATITIS: Status: RESOLVED | Noted: 2023-12-01 | Resolved: 2025-07-07

## 2025-07-07 PROBLEM — N30.20 CHRONIC CYSTITIS: Status: RESOLVED | Noted: 2023-12-01 | Resolved: 2025-07-07

## 2025-07-07 PROBLEM — E11.9 DIABETES MELLITUS, TYPE 2 (MULTI): Status: ACTIVE | Noted: 2025-07-07

## 2025-07-07 PROBLEM — I50.33 ACUTE ON CHRONIC DIASTOLIC CHF (CONGESTIVE HEART FAILURE), NYHA CLASS 1: Status: RESOLVED | Noted: 2023-08-28 | Resolved: 2025-07-07

## 2025-07-09 ENCOUNTER — HOME CARE VISIT (OUTPATIENT)
Dept: HOME HEALTH SERVICES | Facility: HOME HEALTH | Age: OVER 89
End: 2025-07-09
Payer: COMMERCIAL

## 2025-07-09 VITALS
RESPIRATION RATE: 20 BRPM | DIASTOLIC BLOOD PRESSURE: 76 MMHG | HEART RATE: 79 BPM | TEMPERATURE: 97.4 F | SYSTOLIC BLOOD PRESSURE: 130 MMHG | OXYGEN SATURATION: 98 %

## 2025-07-09 VITALS — TEMPERATURE: 97 F

## 2025-07-09 PROCEDURE — G0152 HHCP-SERV OF OT,EA 15 MIN: HCPCS | Mod: HHH

## 2025-07-09 PROCEDURE — G0300 HHS/HOSPICE OF LPN EA 15 MIN: HCPCS | Mod: HHH

## 2025-07-09 SDOH — ECONOMIC STABILITY: GENERAL

## 2025-07-09 SDOH — ECONOMIC STABILITY: HOUSING INSECURITY: EVIDENCE OF SMOKING MATERIAL: 0

## 2025-07-09 SDOH — HEALTH STABILITY: MENTAL HEALTH: SMOKING IN HOME: 0

## 2025-07-09 ASSESSMENT — ACTIVITIES OF DAILY LIVING (ADL)
BATHING_CURRENT_FUNCTION: MAXIMUM ASSIST
GROOMING_CURRENT_FUNCTION: MAXIMUM ASSIST
MONEY MANAGEMENT (EXPENSES/BILLS): TOTALLY DEPENDENT
TOILETING: MODERATE ASSIST
WASHING_UPB_CURRENT_FUNCTION: MAXIMUM ASSIST
USING_UTENSILS_MINIMUM_ASSIST: 1
WASHING_LB_CURRENT_FUNCTION: MODERATE ASSIST
TOILETING: 1
WASHING_BACK_CURRENT_FUNCTION: MAXIMUM ASSIST
CUTTING_FOOD_MAXIMUM_ASSIST: 1
FEEDING ASSESSED: 1
HAIR_CARE_ASSESSED: 1
BATHING ASSESSED: 1
FEEDING: MODERATE ASSIST
GROOMING ASSESSED: 1
WASHING_UPB_CURRENT_FUNCTION: MODERATE ASSIST
HAIR_CARE_DEPENDENT: 1
HAIR_CARE_MAXIMUM_ASSIST: 1
DRESSING_LB_CURRENT_FUNCTION: MAXIMUM ASSIST
WASHING_LB_CURRENT_FUNCTION: MAXIMUM ASSIST
WASHING_HAIR_CURRENT_FUNCTION: MAXIMUM ASSIST
TOILETING: MAXIMUM ASSIST
DRESSING_UB_CURRENT_FUNCTION: MAXIMUM ASSIST
GROOMING_MAXIMUM_ASSIST: 1
GROOMING_CURRENT_FUNCTION: MODERATE ASSIST

## 2025-07-09 ASSESSMENT — ENCOUNTER SYMPTOMS
PAIN LOCATION - RELIEVING FACTORS: TYLENOL
PAIN LOCATION - PAIN QUALITY: ACHING
PAIN LOCATION - PAIN SEVERITY: 2/10
HIGHEST PAIN SEVERITY IN PAST 24 HOURS: 3/10
PAIN: 1
PAIN LOCATION: RIGHT SHOULDER
PAIN LOCATION - EXACERBATING FACTORS: MOVEMENT
PERSON REPORTING PAIN: PATIENT
PAIN: 1
PAIN SEVERITY GOAL: 1/10
PAIN LOCATION - PAIN SEVERITY: 10/10
APPETITE LEVEL: GOOD
LOWEST PAIN SEVERITY IN PAST 24 HOURS: 2/10
PAIN LOCATION: RIGHT ARM
LAST BOWEL MOVEMENT: 67393
SUBJECTIVE PAIN PROGRESSION: GRADUALLY IMPROVING

## 2025-07-09 ASSESSMENT — PAIN SCALES - PAIN ASSESSMENT IN ADVANCED DEMENTIA (PAINAD)
FACIALEXPRESSION: 0
NEGVOCALIZATION: 1 - OCCASIONAL MOAN OR GROAN. LOW-LEVEL SPEECH WITH A NEGATIVE OR DISAPPROVING QUALITY.
CONSOLABILITY: 0
CONSOLABILITY: 0 - NO NEED TO CONSOLE.
BREATHING: 0
FACIALEXPRESSION: 0 - SMILING OR INEXPRESSIVE.
BODYLANGUAGE: 0 - RELAXED.
BODYLANGUAGE: 0
TOTALSCORE: 1
NEGVOCALIZATION: 1

## 2025-07-10 ENCOUNTER — HOME CARE VISIT (OUTPATIENT)
Dept: HOME HEALTH SERVICES | Facility: HOME HEALTH | Age: OVER 89
End: 2025-07-10
Payer: COMMERCIAL

## 2025-07-10 PROCEDURE — G0157 HHC PT ASSISTANT EA 15: HCPCS | Mod: CQ,HHH

## 2025-07-10 ASSESSMENT — ENCOUNTER SYMPTOMS
PAIN LOCATION: RIGHT HAND
PERSON REPORTING PAIN: PATIENT
HIGHEST PAIN SEVERITY IN PAST 24 HOURS: 4/10
PAIN: 1

## 2025-07-11 ENCOUNTER — HOME CARE VISIT (OUTPATIENT)
Dept: HOME HEALTH SERVICES | Facility: HOME HEALTH | Age: OVER 89
End: 2025-07-11
Payer: COMMERCIAL

## 2025-07-14 ENCOUNTER — HOME CARE VISIT (OUTPATIENT)
Dept: HOME HEALTH SERVICES | Facility: HOME HEALTH | Age: OVER 89
End: 2025-07-14
Payer: COMMERCIAL

## 2025-07-14 PROCEDURE — G0157 HHC PT ASSISTANT EA 15: HCPCS | Mod: CQ,HHH

## 2025-07-14 ASSESSMENT — ENCOUNTER SYMPTOMS
SUBJECTIVE PAIN PROGRESSION: GRADUALLY IMPROVING
PAIN: 1
HIGHEST PAIN SEVERITY IN PAST 24 HOURS: 2/10
PAIN LOCATION: RIGHT SHOULDER
PERSON REPORTING PAIN: PATIENT

## 2025-07-16 ENCOUNTER — HOME CARE VISIT (OUTPATIENT)
Dept: HOME HEALTH SERVICES | Facility: HOME HEALTH | Age: OVER 89
End: 2025-07-16
Payer: COMMERCIAL

## 2025-07-16 VITALS
RESPIRATION RATE: 20 BRPM | TEMPERATURE: 97.3 F | OXYGEN SATURATION: 100 % | DIASTOLIC BLOOD PRESSURE: 62 MMHG | HEART RATE: 68 BPM | SYSTOLIC BLOOD PRESSURE: 126 MMHG

## 2025-07-16 VITALS — HEART RATE: 67 BPM | DIASTOLIC BLOOD PRESSURE: 94 MMHG | SYSTOLIC BLOOD PRESSURE: 145 MMHG

## 2025-07-16 PROCEDURE — G0152 HHCP-SERV OF OT,EA 15 MIN: HCPCS | Mod: HHH

## 2025-07-16 PROCEDURE — G0300 HHS/HOSPICE OF LPN EA 15 MIN: HCPCS | Mod: HHH

## 2025-07-16 SDOH — HEALTH STABILITY: MENTAL HEALTH: SMOKING IN HOME: 0

## 2025-07-16 SDOH — ECONOMIC STABILITY: HOUSING INSECURITY: EVIDENCE OF SMOKING MATERIAL: 0

## 2025-07-16 SDOH — ECONOMIC STABILITY: GENERAL

## 2025-07-16 ASSESSMENT — PAIN SCALES - PAIN ASSESSMENT IN ADVANCED DEMENTIA (PAINAD)
NEGVOCALIZATION: 1
NEGVOCALIZATION: 1 - OCCASIONAL MOAN OR GROAN. LOW-LEVEL SPEECH WITH A NEGATIVE OR DISAPPROVING QUALITY.
CONSOLABILITY: 0
CONSOLABILITY: 0 - NO NEED TO CONSOLE.
TOTALSCORE: 1
BREATHING: 0
BODYLANGUAGE: 0
BODYLANGUAGE: 0 - RELAXED.
FACIALEXPRESSION: 0
FACIALEXPRESSION: 0 - SMILING OR INEXPRESSIVE.

## 2025-07-16 ASSESSMENT — ACTIVITIES OF DAILY LIVING (ADL)
DRESSING_LB_CURRENT_FUNCTION: MODERATE ASSIST
DRESSING_UB_CURRENT_FUNCTION: MINIMUM ASSIST
TOILETING: MODERATE ASSIST
TOILETING: MINIMUM ASSIST
TOILETING: 1
MONEY MANAGEMENT (EXPENSES/BILLS): NEEDS ASSISTANCE

## 2025-07-16 ASSESSMENT — ENCOUNTER SYMPTOMS
LAST BOWEL MOVEMENT: 67399
HIGHEST PAIN SEVERITY IN PAST 24 HOURS: 5/10
APPETITE LEVEL: FAIR
DENIES PAIN: 1
PERSON REPORTING PAIN: PATIENT
PAIN SEVERITY GOAL: 3/10
PAIN LOCATION: RIGHT SHOULDER
PAIN: 1
LOWEST PAIN SEVERITY IN PAST 24 HOURS: 4/10
PAIN LOCATION - PAIN SEVERITY: 4/10
ANGER WITHIN DEFINED LIMITS: 1
SUBJECTIVE PAIN PROGRESSION: GRADUALLY IMPROVING
SLEEP QUALITY: ADEQUATE
AGGRESSION WITHIN DEFINED LIMITS: 1

## 2025-07-17 ENCOUNTER — HOME CARE VISIT (OUTPATIENT)
Dept: HOME HEALTH SERVICES | Facility: HOME HEALTH | Age: OVER 89
End: 2025-07-17
Payer: COMMERCIAL

## 2025-07-17 PROCEDURE — G0157 HHC PT ASSISTANT EA 15: HCPCS | Mod: CQ,HHH

## 2025-07-17 ASSESSMENT — ENCOUNTER SYMPTOMS
PAIN LOCATION: RIGHT SHOULDER
PERSON REPORTING PAIN: PATIENT
HIGHEST PAIN SEVERITY IN PAST 24 HOURS: 3/10
PAIN: 1
SUBJECTIVE PAIN PROGRESSION: GRADUALLY IMPROVING

## 2025-07-18 ENCOUNTER — HOSPITAL ENCOUNTER (OUTPATIENT)
Dept: RADIOLOGY | Facility: HOSPITAL | Age: OVER 89
Discharge: HOME | End: 2025-07-18
Payer: COMMERCIAL

## 2025-07-18 ENCOUNTER — HOME CARE VISIT (OUTPATIENT)
Dept: HOME HEALTH SERVICES | Facility: HOME HEALTH | Age: OVER 89
End: 2025-07-18
Payer: COMMERCIAL

## 2025-07-18 VITALS — HEART RATE: 69 BPM | SYSTOLIC BLOOD PRESSURE: 144 MMHG | DIASTOLIC BLOOD PRESSURE: 90 MMHG

## 2025-07-18 DIAGNOSIS — I62.9 INTRACRANIAL BLEED (MULTI): ICD-10-CM

## 2025-07-18 PROBLEM — S42.001A RIGHT CLAVICLE FRACTURE: Status: ACTIVE | Noted: 2025-07-07

## 2025-07-18 PROCEDURE — G0152 HHCP-SERV OF OT,EA 15 MIN: HCPCS | Mod: HHH

## 2025-07-18 PROCEDURE — 70450 CT HEAD/BRAIN W/O DYE: CPT

## 2025-07-18 SDOH — ECONOMIC STABILITY: HOUSING INSECURITY: EVIDENCE OF SMOKING MATERIAL: 0

## 2025-07-18 SDOH — HEALTH STABILITY: MENTAL HEALTH: SMOKING IN HOME: 1

## 2025-07-18 ASSESSMENT — PAIN SCALES - PAIN ASSESSMENT IN ADVANCED DEMENTIA (PAINAD)
NEGVOCALIZATION: 1 - OCCASIONAL MOAN OR GROAN. LOW-LEVEL SPEECH WITH A NEGATIVE OR DISAPPROVING QUALITY.
FACIALEXPRESSION: 0
FACIALEXPRESSION: 0 - SMILING OR INEXPRESSIVE.
CONSOLABILITY: 0
NEGVOCALIZATION: 1
BREATHING: 0
BODYLANGUAGE: 0
BODYLANGUAGE: 0 - RELAXED.
CONSOLABILITY: 0 - NO NEED TO CONSOLE.
TOTALSCORE: 1

## 2025-07-18 ASSESSMENT — ENCOUNTER SYMPTOMS
PAIN: 1
PAIN LOCATION - PAIN SEVERITY: 3/10
HIGHEST PAIN SEVERITY IN PAST 24 HOURS: 3/10
PAIN LOCATION: RIGHT SHOULDER
PAIN SEVERITY GOAL: 1/10
PERSON REPORTING PAIN: PATIENT
SUBJECTIVE PAIN PROGRESSION: GRADUALLY IMPROVING
LOWEST PAIN SEVERITY IN PAST 24 HOURS: 2/10

## 2025-07-18 ASSESSMENT — ACTIVITIES OF DAILY LIVING (ADL)
BATHING ASSESSED: 1
TOILETING: 1
BATHING_CURRENT_FUNCTION: MODERATE ASSIST
DRESSING_LB_CURRENT_FUNCTION: MODERATE ASSIST
TOILETING: MODERATE ASSIST
TOILETING: MINIMUM ASSIST

## 2025-07-21 ENCOUNTER — APPOINTMENT (OUTPATIENT)
Dept: NEUROSURGERY | Facility: CLINIC | Age: OVER 89
End: 2025-07-21
Payer: COMMERCIAL

## 2025-07-21 VITALS
HEART RATE: 72 BPM | BODY MASS INDEX: 19.63 KG/M2 | SYSTOLIC BLOOD PRESSURE: 120 MMHG | DIASTOLIC BLOOD PRESSURE: 70 MMHG | WEIGHT: 100 LBS | HEIGHT: 60 IN

## 2025-07-21 DIAGNOSIS — I61.5 IVH (INTRAVENTRICULAR HEMORRHAGE) (MULTI): Primary | ICD-10-CM

## 2025-07-21 PROCEDURE — 3074F SYST BP LT 130 MM HG: CPT | Performed by: NURSE PRACTITIONER

## 2025-07-21 PROCEDURE — 1111F DSCHRG MED/CURRENT MED MERGE: CPT | Performed by: NURSE PRACTITIONER

## 2025-07-21 PROCEDURE — 3078F DIAST BP <80 MM HG: CPT | Performed by: NURSE PRACTITIONER

## 2025-07-21 PROCEDURE — 99214 OFFICE O/P EST MOD 30 MIN: CPT | Performed by: NURSE PRACTITIONER

## 2025-07-21 ASSESSMENT — PATIENT HEALTH QUESTIONNAIRE - PHQ9
SUM OF ALL RESPONSES TO PHQ9 QUESTIONS 1 AND 2: 0
1. LITTLE INTEREST OR PLEASURE IN DOING THINGS: NOT AT ALL
2. FEELING DOWN, DEPRESSED OR HOPELESS: NOT AT ALL

## 2025-07-21 ASSESSMENT — ENCOUNTER SYMPTOMS: DEPRESSION: 0

## 2025-07-21 NOTE — PROGRESS NOTES
[facility] [place]  Department of Neurological Surgery  Established Patient Visit    History of Present Illness:     Soledad Melendrez is a 89 y.o. year old female who presents to the neurosurgery clinic for intracranial bleed.  Patient presented to [facility] after mechanical fall and was consulted on by Dr. Steve Momin on 06/29/2025 for intraventricular hemorrhage.  The patient is managed on anticoagulation for A-fib and was reversed upon admission.  Her hemorrhage eventually stabilized and did not require any surgical interventions.  She was instructed to hold her anticoagulation for 2 weeks, obtain a repeat CT head and follow-up in the neurosurgery [facility] clinic which is why she is here today.    Chief Complaint   Patient presents with    Follow-up     FU CT head      14/14 systems reviewed and negative other than what is listed in the history of present illness  Problem List[1]  Medical History[2]  Surgical History[3]  Social History     Tobacco Use    Smoking status: Former     Types: Cigarettes    Smokeless tobacco: Never   Substance Use Topics    Alcohol use: Never     family history includes Diabetes in her brother and sister; Hypertension in her brother and father; Stroke in her father.  Current Medications[4]  Allergies[5]    Physical Examination:     General: Well developed, awake/alert/oriented x3, no distress, alert and cooperative  Skin: Warm and dry, no lesions, no rashes  ENMT: Mucous membranes moist, no apparent injury, no lesions seen  Head/Neck: Neck Supple, no apparent injury  Respiratory/Thorax: Normal breath sounds with good chest expansion, thorax symmetric  Cardiovascular: No pitting edema, no JVD    Cranial nerves   II/III: Visual fields are full. Pupils are reactive bilaterally.  III/IV/VI: Extraocular movements are full with no nystagmus.   V: Facial sensation is intact to light touch.  VII: Face is symmetric.  VIII: hearing is intact bilaterally.  IX/X: Palate  elevates symmetrically to phonation.  XI: Sternocleidomastoid is 5/5 to strength testing.  XII: Tongue is midline.    Motor Strength: 5/5 Throughout bilateral upper extremities and bilateral lower extremities with the exception of the push and pull for the right upper extremity due to brace.    Muscle Bulk: Normal and symmetric in all extremities     Paraspinal muscle spasm/tenderness absent    Midline tenderness absent    Sensation to light touch intact    Results:     I personally reviewed and interpreted the imaging results which included CT of the head from 07/18/2025 which showed resolution of her previously noted IVH.    Assessment and Plan:     Soledad Melendrez is a 89 y.o. year old female who presents to the neurosurgery clinic for intracranial bleed.  Patient presented to [facility] after mechanical fall and was consulted on by Dr. Steve Momin on 06/29/2025 for intraventricular hemorrhage.  The patient is managed on anticoagulation for A-fib and was reversed upon admission.  Her hemorrhage eventually stabilized and did not require any surgical interventions.  She was instructed to hold her anticoagulation for 2 weeks, obtain a repeat CT head and follow-up in the neurosurgery [facility] clinic which is why she is here today.    Patient has a history of dementia but was alert and oriented x 2 today.  No focal deficits on neurologic exam.  PERRLA.  She denied any lightheadedness, dizziness, headaches, blurred vision or double vision.  The patient speaks broken English with her primary language being Hungarian however her granddaughter was with her at the appointment and assisted her with her language barrier.  She had 5/5 strength in all 4 extremities with the exception of her right upper extremity which is in a brace for an injury suffered from the fall.    I personally reviewed her CT of the head from 07/18/2025 which showed resolution of her previously noted IVH.  At this time she does not require any further  imaging.  She is okay to resume her anticoagulation at the discretion of her scribing provider.  We discussed that at the age of 89 they should ideally weigh the risks and benefits of continuing anticoagulation long-term and that the risk of increased falls at this point may outweigh the benefit of anticoagulation.  She is scheduled to see a cardiologist in consult tomorrow to discuss the possibility of permanent discontinuation.  She does not require any further neurosurgical follow-up or imaging at this time.  She will follow-up as needed.      Samantha Meeson, MSN, NP-C  Adult-Gerontology Associate Nurse Practitioner  Department of Neurosurgery- [facility]  Main phone [phone]  Fax [phone]               [1]   Patient Active Problem List  Diagnosis    AAA (abdominal aortic aneurysm)    Atrial fibrillation (Multi)    CAD (coronary artery disease)    Callus of foot    Dizziness    Foot pain, bilateral    Hav (hallux abducto valgus), left    Hav (hallux abducto valgus), right    Hyperlipidemia    Mitral valve regurgitation    Neck pain on left side    Onycholysis of toenail    Onychomycosis    Osteoarthritis of left ankle and foot    Osteoarthritis of right ankle and foot    Peripheral vertigo    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    Sensorineural hearing loss, bilateral    Hypothyroidism    Venous insufficiency (chronic) (peripheral)    Weight loss, abnormal    S/p bilateral carotid endarterectomy    HTN (hypertension), benign    Acute renal failure    Memory loss    Bilateral cataracts    Chronic cough    Chronic respiratory failure    Closed fracture of lower end of right radius with routine healing    Closed nondisplaced fracture of styloid process of right ulna with routine healing    GERD without esophagitis    Elevated uric acid in blood    Depressive disorder    Hx of CABG    History of myocardial infarction    Idiopathic sleep related nonobstructive alveolar  hypoventilation    Insomnia    Internal hemorrhoid    Lung mass    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    Pneumonia due to COVID-19 virus    Polyneuropathy due to type 2 diabetes mellitus (Multi)    Pseudophakia    S/P stroke due to cerebrovascular disease    Varicose veins of legs    Aortic valve regurgitation    Tricuspid valve regurgitation    Diabetic neuropathy (Multi)    Chronic right-sided CHF (congestive heart failure)    Diabetes mellitus, type 2 (Multi)    Right clavicle fracture   [2]   Past Medical History:  Diagnosis Date    Old myocardial infarction     History of myocardial infarction    Onycholysis 12/01/2023    Personal history of other diseases of the circulatory system     History of coronary artery disease    Personal history of other diseases of the circulatory system 06/30/2020    History of hypertension    Personal history of other diseases of the circulatory system     History of hypertension    Personal history of other diseases of the musculoskeletal system and connective tissue     History of arthritis    Personal history of other diseases of the respiratory system     History of lung disease    Personal history of other endocrine, nutritional and metabolic disease     History of thyroid disorder    Personal history of other endocrine, nutritional and metabolic disease 09/01/2016    History of type 2 diabetes mellitus    Personal history of other endocrine, nutritional and metabolic disease     History of hyperlipidemia    Personal history of other endocrine, nutritional and metabolic disease     History of hypothyroidism    Unspecified cataract     Cataracts, bilateral    Unspecified hearing loss, right ear 01/12/2019    Hearing loss on right   [3]   Past Surgical History:  Procedure Laterality Date    APPENDECTOMY  09/01/2016    Appendectomy    CORONARY ARTERY BYPASS GRAFT  09/01/2016    CABG    OTHER SURGICAL HISTORY   09/01/2016    Endarterectomy Carotid Artery    TONSILLECTOMY  09/01/2016    Tonsillectomy With Adenoidectomy   [4]   Current Outpatient Medications:     acetaminophen (Tylenol Arthritis Pain) 650 mg ER tablet, Take 1 tablet (650 mg) by mouth once daily as needed for mild pain (1 - 3)., Disp: , Rfl:     albuterol 2.5 mg/0.5 mL nebulizer solution, Take 0.5 mL (2.5 mg) by nebulization every 6 hours if needed for wheezing or shortness of breath., Disp: , Rfl:     albuterol 90 mcg/actuation inhaler, Inhale 2 puffs every 6 hours if needed for wheezing., Disp: , Rfl:     atorvastatin (Lipitor) 20 mg tablet, Take 1 tablet (20 mg) by mouth once daily., Disp: , Rfl:     fluticasone (Flonase) 50 mcg/actuation nasal spray, Administer 1 spray into each nostril once daily at bedtime. Shake gently. Before first use, prime pump. After use, clean tip and replace cap, Disp: , Rfl:     furosemide (Lasix) 20 mg tablet, Take 2 tablets (40 mg) by mouth once daily in the morning AND 2 tablets (40 mg) once daily in the evening. (Patient taking differently: Take 20 mg by mouth once daily in the morning AND 20 mg once daily in the evening.), Disp: 360 tablet, Rfl: 3    levothyroxine (Synthroid, Levoxyl) 50 mcg tablet, Take 1 tablet (50 mcg) by mouth early in the morning.., Disp: , Rfl:     metoprolol tartrate (Lopressor) 25 mg tablet, Take 1 tablet (25 mg) by mouth twice a day., Disp: , Rfl:     nitroglycerin (Nitrostat) 0.4 mg SL tablet, Place 1 tablet (0.4 mg) under the tongue every 5 minutes if needed for chest pain., Disp: , Rfl:     omeprazole (PriLOSEC) 20 mg DR capsule, Take 1 capsule (20 mg) by mouth once daily., Disp: , Rfl:     oxygen (O2) gas therapy, Inhale 2 L/min continuously. Indications: Shortness of breath (Ped 0-17y), Disp: , Rfl:     polyethylene glycol (Glycolax, Miralax) 17 gram/dose powder, Mix 17 g of powder and drink once daily as needed., Disp: , Rfl:     spironolactone (Aldactone) 25 mg tablet, Take 1 tablet (25 mg)  by mouth once daily., Disp: , Rfl:   [5]   Allergies  Allergen Reactions    Influenza Virus Vaccines Other     States she became sick every time    Lisinopril Cough    Morphine Hives    Nsaids (Non-Steroidal Anti-Inflammatory Drug) Other     CKD    Pneumococcal Vaccine Unknown    Lovastatin Other     Tingling

## 2025-07-22 ENCOUNTER — OFFICE VISIT (OUTPATIENT)
Dept: CARDIOLOGY | Facility: CLINIC | Age: OVER 89
End: 2025-07-22
Payer: COMMERCIAL

## 2025-07-22 VITALS
HEART RATE: 79 BPM | HEIGHT: 61 IN | DIASTOLIC BLOOD PRESSURE: 80 MMHG | OXYGEN SATURATION: 95 % | SYSTOLIC BLOOD PRESSURE: 120 MMHG | BODY MASS INDEX: 19.83 KG/M2 | WEIGHT: 105 LBS

## 2025-07-22 DIAGNOSIS — I50.32 CHRONIC DIASTOLIC CONGESTIVE HEART FAILURE: ICD-10-CM

## 2025-07-22 DIAGNOSIS — Z98.890 S/P BILATERAL CAROTID ENDARTERECTOMY: ICD-10-CM

## 2025-07-22 DIAGNOSIS — S52.614D CLOSED NONDISPLACED FRACTURE OF STYLOID PROCESS OF RIGHT ULNA WITH ROUTINE HEALING: ICD-10-CM

## 2025-07-22 DIAGNOSIS — I10 HTN (HYPERTENSION), BENIGN: ICD-10-CM

## 2025-07-22 DIAGNOSIS — I48.11 LONGSTANDING PERSISTENT ATRIAL FIBRILLATION (MULTI): Primary | ICD-10-CM

## 2025-07-22 DIAGNOSIS — I25.10 CORONARY ARTERY DISEASE INVOLVING NATIVE CORONARY ARTERY OF NATIVE HEART WITHOUT ANGINA PECTORIS: ICD-10-CM

## 2025-07-22 DIAGNOSIS — E11.9 TYPE 2 DIABETES MELLITUS WITHOUT COMPLICATION, UNSPECIFIED WHETHER LONG TERM INSULIN USE: ICD-10-CM

## 2025-07-22 DIAGNOSIS — Z95.1 HX OF CABG: ICD-10-CM

## 2025-07-22 DIAGNOSIS — I35.1 NONRHEUMATIC AORTIC VALVE INSUFFICIENCY: ICD-10-CM

## 2025-07-22 DIAGNOSIS — S52.501D CLOSED FRACTURE OF DISTAL END OF RIGHT RADIUS WITH ROUTINE HEALING, UNSPECIFIED FRACTURE MORPHOLOGY, SUBSEQUENT ENCOUNTER: ICD-10-CM

## 2025-07-22 DIAGNOSIS — I61.4 RIGHT-SIDED NONTRAUMATIC INTRACEREBRAL HEMORRHAGE OF CEREBELLUM: ICD-10-CM

## 2025-07-22 DIAGNOSIS — I34.0 NONRHEUMATIC MITRAL VALVE REGURGITATION: ICD-10-CM

## 2025-07-22 DIAGNOSIS — E78.00 PURE HYPERCHOLESTEROLEMIA: ICD-10-CM

## 2025-07-22 PROCEDURE — 3078F DIAST BP <80 MM HG: CPT | Performed by: INTERNAL MEDICINE

## 2025-07-22 PROCEDURE — 1111F DSCHRG MED/CURRENT MED MERGE: CPT | Performed by: INTERNAL MEDICINE

## 2025-07-22 PROCEDURE — 99214 OFFICE O/P EST MOD 30 MIN: CPT | Performed by: INTERNAL MEDICINE

## 2025-07-22 PROCEDURE — 1160F RVW MEDS BY RX/DR IN RCRD: CPT | Performed by: INTERNAL MEDICINE

## 2025-07-22 PROCEDURE — 3074F SYST BP LT 130 MM HG: CPT | Performed by: INTERNAL MEDICINE

## 2025-07-22 PROCEDURE — 93005 ELECTROCARDIOGRAM TRACING: CPT | Performed by: INTERNAL MEDICINE

## 2025-07-22 PROCEDURE — 1159F MED LIST DOCD IN RCRD: CPT | Performed by: INTERNAL MEDICINE

## 2025-07-22 PROCEDURE — 99212 OFFICE O/P EST SF 10 MIN: CPT

## 2025-07-22 NOTE — ASSESSMENT & PLAN NOTE
9/27/24 echocardiogram severe MR with LVEF = 50-55%.  MitraClip refused by Dr. Philippe at [facility]

## 2025-07-22 NOTE — PROGRESS NOTES
"  Don Melendrez  is a 89 y.o. year old female who presents for f/U mitral regurgitation. Hospitalized [facility] for intracranial bleed and righ clavicular fracture discharged 7/1/25.  Doing ell.  Lasix was decreased     Blood pressure 120/80, pulse 79, height (!) 1.549 m (5' 1\"), weight 47.6 kg (105 lb), SpO2 95%.   Influenza virus vaccines, Lisinopril, Morphine, Nsaids (non-steroidal anti-inflammatory drug), Pneumococcal vaccine, and Lovastatin  Medical History[1]  Surgical History[2]  Family History[3]  @[facility]    Current Medications[4]     ROS  Review of Systems   All other systems reviewed and are negative.      Physical Exam  Physical Exam  Constitutional:       Appearance: Normal appearance.   HENT:      Head: Normocephalic and atraumatic.     Cardiovascular:      Rate and Rhythm: Normal rate. Rhythm irregularly irregular.   Pulmonary:      Effort: Pulmonary effort is normal.      Breath sounds: Normal breath sounds.   Abdominal:      General: Abdomen is flat.     Musculoskeletal:      Right lower leg: No edema.      Left lower leg: No edema.     Skin:     General: Skin is warm and dry.     Neurological:      General: No focal deficit present.      Mental Status: She is alert and oriented to person, place, and time.     Psychiatric:         Mood and Affect: Mood normal.         Behavior: Behavior normal.          EKG  Encounter Date: 04/30/25   ECG 12 Lead   Result Value    Ventricular Rate 69    QRS Duration 84    QT Interval 380    QTC Calculation(Bazett) 407    R Axis 98    T Axis 223    QRS Count 11    Q Onset 223    T Offset 413    QTC Fredericia 398    Narrative    Atrial fibrillation  Rightward axis  ST & T wave abnormality, consider inferolateral ischemia  Abnormal ECG  When compared with ECG of 28-JAN-2025 11:32,  Atrial fibrillation has replaced Sinus rhythm  ST no longer depressed in Inferior leads  ST now depressed in Lateral leads  T wave inversion now evident in Lateral " leads  Confirmed by Calin Hernandez ([id number]) on 4/30/2025 5:09:10 PM       Problem List Items Addressed This Visit       Atrial fibrillation (Multi) - Primary    CAD (coronary artery disease)    Hyperlipidemia    Mitral valve regurgitation    9/27/24 echocardiogram severe MR with LVEF = 50-55%.  MitraClip refused by Dr. 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    S/p bilateral carotid endarterectomy    HTN (hypertension), benign    Closed fracture of lower end of right radius with routine healing    Closed nondisplaced fracture of styloid process of right ulna with routine healing    Hx of CABG    Aortic valve regurgitation    Relevant Orders    Follow Up In Cardiology    Diabetes mellitus, type 2 (Multi)     Other Visit Diagnoses         Chronic diastolic congestive heart failure        Relevant Orders    ECG 12 lead (Clinic Performed)    Follow Up In Cardiology              No coumadin  Baby Asa 1 daily  Return 3 months with EKG      Calin Hernandez MD        [1]   Past Medical History:  Diagnosis Date    Old myocardial infarction     History of myocardial infarction    Onycholysis 12/01/2023    Personal history of other diseases of the circulatory system     History of coronary artery disease    Personal history of other diseases of the circulatory system 06/30/2020    History of hypertension    Personal history of other diseases of the circulatory system     History of hypertension    Personal history of other diseases of the musculoskeletal system and connective tissue     History of arthritis    Personal history of other diseases of the respiratory system     History of lung disease    Personal history of other endocrine, nutritional and metabolic disease     History of thyroid disorder    Personal history of other endocrine, nutritional and metabolic disease 09/01/2016    History of type 2 diabetes mellitus    Personal history of other endocrine, nutritional and  metabolic disease     History of hyperlipidemia    Personal history of other endocrine, nutritional and metabolic disease     History of hypothyroidism    Unspecified cataract     Cataracts, bilateral    Unspecified hearing loss, right ear 01/12/2019    Hearing loss on right   [2]   Past Surgical History:  Procedure Laterality Date    APPENDECTOMY  09/01/2016    Appendectomy    CORONARY ARTERY BYPASS GRAFT  09/01/2016    CABG    OTHER SURGICAL HISTORY  09/01/2016    Endarterectomy Carotid Artery    TONSILLECTOMY  09/01/2016    Tonsillectomy With Adenoidectomy   [3]   Family History  Problem Relation Name Age of Onset    Stroke Father      Hypertension Father      Diabetes Sister      Diabetes Brother      Hypertension Brother     [4]   Current Outpatient Medications   Medication Sig Dispense Refill    acetaminophen (Tylenol Arthritis Pain) 650 mg ER tablet Take 1 tablet (650 mg) by mouth once daily as needed for mild pain (1 - 3).      atorvastatin (Lipitor) 20 mg tablet Take 1 tablet (20 mg) by mouth once daily.      fluticasone (Flonase) 50 mcg/actuation nasal spray Administer 1 spray into each nostril once daily at bedtime. Shake gently. Before first use, prime pump. After use, clean tip and replace cap      furosemide (Lasix) 20 mg tablet Take 2 tablets (40 mg) by mouth once daily in the morning AND 2 tablets (40 mg) once daily in the evening. (Patient taking differently: Take 20 mg by mouth once daily in the morning AND 20 mg once daily in the evening.) 360 tablet 3    levothyroxine (Synthroid, Levoxyl) 50 mcg tablet Take 1 tablet (50 mcg) by mouth early in the morning..      metoprolol tartrate (Lopressor) 25 mg tablet Take 1 tablet (25 mg) by mouth twice a day.      nitroglycerin (Nitrostat) 0.4 mg SL tablet Place 1 tablet (0.4 mg) under the tongue every 5 minutes if needed for chest pain.      omeprazole (PriLOSEC) 20 mg DR capsule Take 1 capsule (20 mg) by mouth once daily.      oxygen (O2) gas therapy  Inhale 2 L/min continuously. Indications: Shortness of breath (Ped 0-17y)      polyethylene glycol (Glycolax, Miralax) 17 gram/dose powder Mix 17 g of powder and drink once daily as needed.      spironolactone (Aldactone) 25 mg tablet Take 1 tablet (25 mg) by mouth once daily.      albuterol 2.5 mg/0.5 mL nebulizer solution Take 0.5 mL (2.5 mg) by nebulization every 6 hours if needed for wheezing or shortness of breath. (Patient not taking: Reported on 7/22/2025)      albuterol 90 mcg/actuation inhaler Inhale 2 puffs every 6 hours if needed for wheezing. (Patient not taking: Reported on 7/22/2025)       No current facility-administered medications for this visit.

## 2025-07-23 ENCOUNTER — HOME CARE VISIT (OUTPATIENT)
Dept: HOME HEALTH SERVICES | Facility: HOME HEALTH | Age: OVER 89
End: 2025-07-23
Payer: COMMERCIAL

## 2025-07-23 VITALS
HEART RATE: 65 BPM | SYSTOLIC BLOOD PRESSURE: 120 MMHG | DIASTOLIC BLOOD PRESSURE: 63 MMHG | OXYGEN SATURATION: 99 % | RESPIRATION RATE: 18 BRPM | TEMPERATURE: 97 F

## 2025-07-23 VITALS — SYSTOLIC BLOOD PRESSURE: 132 MMHG | DIASTOLIC BLOOD PRESSURE: 110 MMHG

## 2025-07-23 PROCEDURE — G0152 HHCP-SERV OF OT,EA 15 MIN: HCPCS | Mod: HHH

## 2025-07-23 PROCEDURE — G0300 HHS/HOSPICE OF LPN EA 15 MIN: HCPCS | Mod: HHH

## 2025-07-23 SDOH — HEALTH STABILITY: MENTAL HEALTH: SMOKING IN HOME: 0

## 2025-07-23 SDOH — ECONOMIC STABILITY: HOUSING INSECURITY: EVIDENCE OF SMOKING MATERIAL: 0

## 2025-07-23 ASSESSMENT — ACTIVITIES OF DAILY LIVING (ADL)
GROOMING_CURRENT_FUNCTION: MINIMUM ASSIST
BATHING_CURRENT_FUNCTION: MODERATE ASSIST
FEEDING: MINIMUM ASSIST
TOILETING: 1
TOILETING: 1
TOILETING: MODERATE ASSIST
BATHING ASSESSED: 1
AMBULATION ASSISTANCE: SUPERVISION
AMBULATION ASSISTANCE: 1
BATHING_CURRENT_FUNCTION: MODERATE ASSIST
GROOMING ASSESSED: 1
DRESSING_LB_CURRENT_FUNCTION: MODERATE ASSIST
DRESSING_UB_CURRENT_FUNCTION: MODERATE ASSIST
BATHING ASSESSED: 1
TOILETING: SUPERVISION
FEEDING ASSESSED: 1
DRESSING_LB_CURRENT_FUNCTION: MODERATE ASSIST

## 2025-07-23 ASSESSMENT — PAIN SCALES - PAIN ASSESSMENT IN ADVANCED DEMENTIA (PAINAD)
FACIALEXPRESSION: 0 - SMILING OR INEXPRESSIVE.
NEGVOCALIZATION: 1 - OCCASIONAL MOAN OR GROAN. LOW-LEVEL SPEECH WITH A NEGATIVE OR DISAPPROVING QUALITY.
BODYLANGUAGE: 0
FACIALEXPRESSION: 0
CONSOLABILITY: 0
BREATHING: 0
TOTALSCORE: 1
BODYLANGUAGE: 0 - RELAXED.
NEGVOCALIZATION: 1
CONSOLABILITY: 0 - NO NEED TO CONSOLE.

## 2025-07-23 ASSESSMENT — ENCOUNTER SYMPTOMS
FATIGUES EASILY: 1
HIGHEST PAIN SEVERITY IN PAST 24 HOURS: 5/10
LOWEST PAIN SEVERITY IN PAST 24 HOURS: 4/10
PAIN LOCATION - PAIN SEVERITY: 4/10
PAIN LOCATION: RIGHT SHOULDER
PAIN: 1
LAST BOWEL MOVEMENT: 67406
DYSPNEA ACTIVITY LEVEL: AFTER AMBULATING MORE THAN 20 FT
PAIN: 1
STOOL FREQUENCY: LESS THAN DAILY
PAIN SEVERITY GOAL: 3/10
FORGETFULNESS: 1
DYSPNEA ON EXERTION: 1
SHORTNESS OF BREATH: 1
MUSCLE WEAKNESS: 1
APPETITE LEVEL: FAIR
LIMITED RANGE OF MOTION: 1
PAIN LOCATION: RIGHT SHOULDER
PAIN LOCATION - PAIN SEVERITY: 2/10
PERSON REPORTING PAIN: PATIENT
CHANGE IN APPETITE: UNCHANGED
SUBJECTIVE PAIN PROGRESSION: GRADUALLY IMPROVING
PERSON REPORTING PAIN: PATIENT
SUBJECTIVE PAIN PROGRESSION: GRADUALLY IMPROVING

## 2025-07-25 ENCOUNTER — HOME CARE VISIT (OUTPATIENT)
Dept: HOME HEALTH SERVICES | Facility: HOME HEALTH | Age: OVER 89
End: 2025-07-25
Payer: COMMERCIAL

## 2025-07-25 PROCEDURE — G0157 HHC PT ASSISTANT EA 15: HCPCS | Mod: CQ,HHH

## 2025-07-25 ASSESSMENT — ENCOUNTER SYMPTOMS
DENIES PAIN: 1
PERSON REPORTING PAIN: PATIENT

## 2025-07-28 ENCOUNTER — HOME CARE VISIT (OUTPATIENT)
Dept: HOME HEALTH SERVICES | Facility: HOME HEALTH | Age: OVER 89
End: 2025-07-28
Payer: COMMERCIAL

## 2025-07-28 PROCEDURE — G0157 HHC PT ASSISTANT EA 15: HCPCS | Mod: CQ,HHH

## 2025-07-29 ENCOUNTER — HOME CARE VISIT (OUTPATIENT)
Dept: HOME HEALTH SERVICES | Facility: HOME HEALTH | Age: OVER 89
End: 2025-07-29
Payer: COMMERCIAL

## 2025-07-29 VITALS — DIASTOLIC BLOOD PRESSURE: 95 MMHG | HEART RATE: 54 BPM | SYSTOLIC BLOOD PRESSURE: 138 MMHG

## 2025-07-29 PROCEDURE — G0152 HHCP-SERV OF OT,EA 15 MIN: HCPCS | Mod: HHH

## 2025-07-29 SDOH — ECONOMIC STABILITY: HOUSING INSECURITY: EVIDENCE OF SMOKING MATERIAL: 0

## 2025-07-29 SDOH — HEALTH STABILITY: MENTAL HEALTH: SMOKING IN HOME: 0

## 2025-07-29 ASSESSMENT — PAIN SCALES - PAIN ASSESSMENT IN ADVANCED DEMENTIA (PAINAD)
FACIALEXPRESSION: 0 - SMILING OR INEXPRESSIVE.
NEGVOCALIZATION: 0
BODYLANGUAGE: 0 - RELAXED.
BODYLANGUAGE: 0
CONSOLABILITY: 0
TOTALSCORE: 0
CONSOLABILITY: 0 - NO NEED TO CONSOLE.
FACIALEXPRESSION: 0
BREATHING: 0
NEGVOCALIZATION: 0 - NONE.

## 2025-07-29 ASSESSMENT — ACTIVITIES OF DAILY LIVING (ADL)
TOILETING: MINIMUM ASSIST
TOILETING: 1
DRESSING_UB_CURRENT_FUNCTION: CONTACT GUARD ASSIST
GROOMING ASSESSED: 1
GROOMING_CURRENT_FUNCTION: MINIMUM ASSIST
DRESSING_UB_CURRENT_FUNCTION: MINIMUM ASSIST
DRESSING_LB_CURRENT_FUNCTION: MODERATE ASSIST

## 2025-07-29 ASSESSMENT — ENCOUNTER SYMPTOMS
PAIN: PATIENT DENIES PAIN
PERSON REPORTING PAIN: PATIENT
DENIES PAIN: 1

## 2025-07-30 ENCOUNTER — HOME CARE VISIT (OUTPATIENT)
Dept: HOME HEALTH SERVICES | Facility: HOME HEALTH | Age: OVER 89
End: 2025-07-30
Payer: COMMERCIAL

## 2025-07-30 VITALS
SYSTOLIC BLOOD PRESSURE: 132 MMHG | DIASTOLIC BLOOD PRESSURE: 80 MMHG | RESPIRATION RATE: 18 BRPM | HEART RATE: 79 BPM | TEMPERATURE: 97 F | OXYGEN SATURATION: 97 %

## 2025-07-30 PROCEDURE — G0300 HHS/HOSPICE OF LPN EA 15 MIN: HCPCS | Mod: HHH

## 2025-07-30 SDOH — ECONOMIC STABILITY: GENERAL

## 2025-07-30 ASSESSMENT — ENCOUNTER SYMPTOMS
APPETITE LEVEL: GOOD
LAST BOWEL MOVEMENT: 67416

## 2025-07-30 ASSESSMENT — ACTIVITIES OF DAILY LIVING (ADL): MONEY MANAGEMENT (EXPENSES/BILLS): INDEPENDENT

## 2025-07-31 ENCOUNTER — HOME CARE VISIT (OUTPATIENT)
Dept: HOME HEALTH SERVICES | Facility: HOME HEALTH | Age: OVER 89
End: 2025-07-31
Payer: COMMERCIAL

## 2025-07-31 VITALS — HEART RATE: 70 BPM | DIASTOLIC BLOOD PRESSURE: 97 MMHG | SYSTOLIC BLOOD PRESSURE: 132 MMHG

## 2025-07-31 PROCEDURE — G0152 HHCP-SERV OF OT,EA 15 MIN: HCPCS | Mod: HHH

## 2025-07-31 SDOH — HEALTH STABILITY: MENTAL HEALTH: SMOKING IN HOME: 0

## 2025-07-31 SDOH — ECONOMIC STABILITY: HOUSING INSECURITY: EVIDENCE OF SMOKING MATERIAL: 0

## 2025-07-31 ASSESSMENT — PAIN SCALES - PAIN ASSESSMENT IN ADVANCED DEMENTIA (PAINAD)
TOTALSCORE: 0
CONSOLABILITY: 0
NEGVOCALIZATION: 0 - NONE.
NEGVOCALIZATION: 0
FACIALEXPRESSION: 0 - SMILING OR INEXPRESSIVE.
CONSOLABILITY: 0 - NO NEED TO CONSOLE.
FACIALEXPRESSION: 0
BREATHING: 0
BODYLANGUAGE: 0 - RELAXED.
BODYLANGUAGE: 0

## 2025-07-31 ASSESSMENT — ACTIVITIES OF DAILY LIVING (ADL)
GROOMING ASSESSED: 1
GROOMING_CURRENT_FUNCTION: MODERATE ASSIST
GROOMING_CURRENT_FUNCTION: MINIMUM ASSIST
FEEDING ASSESSED: 1
TOILETING: MINIMUM ASSIST
FEEDING: MODERATE ASSIST
TOILETING: 1

## 2025-07-31 ASSESSMENT — ENCOUNTER SYMPTOMS
HIGHEST PAIN SEVERITY IN PAST 24 HOURS: 3/10
PAIN LOCATION - PAIN SEVERITY: 2/10
SUBJECTIVE PAIN PROGRESSION: GRADUALLY IMPROVING
PAIN LOCATION: RIGHT SHOULDER
LOWEST PAIN SEVERITY IN PAST 24 HOURS: 2/10
PERSON REPORTING PAIN: PATIENT
PAIN: 1
PAIN SEVERITY GOAL: 1/10

## 2025-08-01 ENCOUNTER — HOME CARE VISIT (OUTPATIENT)
Dept: HOME HEALTH SERVICES | Facility: HOME HEALTH | Age: OVER 89
End: 2025-08-01
Payer: COMMERCIAL

## 2025-08-01 PROCEDURE — G0151 HHCP-SERV OF PT,EA 15 MIN: HCPCS | Mod: HHH

## 2025-08-01 SDOH — HEALTH STABILITY: PHYSICAL HEALTH: EXERCISE TYPE: SITTING , SUPINE AND STANDING EXS X 10 REPS

## 2025-08-01 ASSESSMENT — ENCOUNTER SYMPTOMS
LOWEST PAIN SEVERITY IN PAST 24 HOURS: 0/10
HIGHEST PAIN SEVERITY IN PAST 24 HOURS: 5/10
PAIN LOCATION: RIGHT ANKLE
PERSON REPORTING PAIN: PATIENT
PAIN: 1
OCCASIONAL FEELINGS OF UNSTEADINESS: 1

## 2025-08-01 ASSESSMENT — ACTIVITIES OF DAILY LIVING (ADL): AMBULATION ASSISTANCE ON FLAT SURFACES: 1

## 2025-08-01 NOTE — HOME HEALTH
patient seen for pt reassessment today with grand dtr present.  they now have visiting angels 3-4 hours a day M-F and they maybe looking for evening help also but need to see things keeps going .  the [facility] is very good with patient.

## 2025-08-04 ENCOUNTER — HOME CARE VISIT (OUTPATIENT)
Dept: HOME HEALTH SERVICES | Facility: HOME HEALTH | Age: OVER 89
End: 2025-08-04
Payer: COMMERCIAL

## 2025-08-04 PROCEDURE — G0157 HHC PT ASSISTANT EA 15: HCPCS | Mod: CQ,HHH

## 2025-08-04 ASSESSMENT — ENCOUNTER SYMPTOMS
DENIES PAIN: 1
PERSON REPORTING PAIN: PATIENT

## 2025-08-05 ENCOUNTER — HOME CARE VISIT (OUTPATIENT)
Dept: HOME HEALTH SERVICES | Facility: HOME HEALTH | Age: OVER 89
End: 2025-08-05
Payer: COMMERCIAL

## 2025-08-05 VITALS — HEART RATE: 70 BPM | SYSTOLIC BLOOD PRESSURE: 151 MMHG | DIASTOLIC BLOOD PRESSURE: 93 MMHG

## 2025-08-05 PROCEDURE — G0152 HHCP-SERV OF OT,EA 15 MIN: HCPCS | Mod: HHH

## 2025-08-05 SDOH — HEALTH STABILITY: MENTAL HEALTH: SMOKING IN HOME: 0

## 2025-08-05 SDOH — ECONOMIC STABILITY: HOUSING INSECURITY: EVIDENCE OF SMOKING MATERIAL: 0

## 2025-08-05 ASSESSMENT — ACTIVITIES OF DAILY LIVING (ADL)
GROOMING_CURRENT_FUNCTION: MINIMUM ASSIST
WASHING_BACK_CURRENT_FUNCTION: MODERATE ASSIST
HAIR_CARE_MODERATE_ASSIST: 1
WASHING_UPB_CURRENT_FUNCTION: MODERATE ASSIST
CUTTING_FOOD_MODERATE_ASSIST: 1
HAIR_CARE_ASSESSED: 1
WASHING_LB_CURRENT_FUNCTION: MODERATE ASSIST
TOILETING: 1
GROOMING ASSESSED: 1
DRESSING_LB_CURRENT_FUNCTION: MODERATE ASSIST
TOILETING: MODERATE ASSIST
CUTTING_FOOD_MAXIMUM_ASSIST: 1
TOILETING: MINIMUM ASSIST
DRESSING_UB_CURRENT_FUNCTION: MODERATE ASSIST
DRESSING_UB_CURRENT_FUNCTION: MINIMUM ASSIST
WASHING_HAIR_CURRENT_FUNCTION: MODERATE ASSIST

## 2025-08-05 ASSESSMENT — PAIN SCALES - PAIN ASSESSMENT IN ADVANCED DEMENTIA (PAINAD)
NEGVOCALIZATION: 0
FACIALEXPRESSION: 0 - SMILING OR INEXPRESSIVE.
BREATHING: 0
TOTALSCORE: 0
CONSOLABILITY: 0 - NO NEED TO CONSOLE.
BODYLANGUAGE: 0
FACIALEXPRESSION: 0
NEGVOCALIZATION: 0 - NONE.
BODYLANGUAGE: 0 - RELAXED.
CONSOLABILITY: 0

## 2025-08-05 ASSESSMENT — ENCOUNTER SYMPTOMS
PERSON REPORTING PAIN: PATIENT
DENIES PAIN: 1

## 2025-08-06 ENCOUNTER — HOME CARE VISIT (OUTPATIENT)
Dept: HOME HEALTH SERVICES | Facility: HOME HEALTH | Age: OVER 89
End: 2025-08-06
Payer: COMMERCIAL

## 2025-08-06 VITALS
RESPIRATION RATE: 18 BRPM | DIASTOLIC BLOOD PRESSURE: 60 MMHG | TEMPERATURE: 97.8 F | OXYGEN SATURATION: 99 % | HEART RATE: 68 BPM | SYSTOLIC BLOOD PRESSURE: 112 MMHG

## 2025-08-06 PROCEDURE — G0300 HHS/HOSPICE OF LPN EA 15 MIN: HCPCS | Mod: HHH

## 2025-08-06 PROCEDURE — G0157 HHC PT ASSISTANT EA 15: HCPCS | Mod: CQ,HHH

## 2025-08-06 SDOH — ECONOMIC STABILITY: GENERAL

## 2025-08-06 ASSESSMENT — ENCOUNTER SYMPTOMS
CHANGE IN APPETITE: UNCHANGED
APPETITE LEVEL: GOOD
DENIES PAIN: 1
PERSON REPORTING PAIN: PATIENT
LAST BOWEL MOVEMENT: 67419
DENIES PAIN: 1

## 2025-08-06 ASSESSMENT — ACTIVITIES OF DAILY LIVING (ADL): MONEY MANAGEMENT (EXPENSES/BILLS): INDEPENDENT

## 2025-08-07 ENCOUNTER — HOME CARE VISIT (OUTPATIENT)
Dept: HOME HEALTH SERVICES | Facility: HOME HEALTH | Age: OVER 89
End: 2025-08-07
Payer: COMMERCIAL

## 2025-08-07 VITALS — DIASTOLIC BLOOD PRESSURE: 87 MMHG | SYSTOLIC BLOOD PRESSURE: 141 MMHG

## 2025-08-07 PROCEDURE — G0152 HHCP-SERV OF OT,EA 15 MIN: HCPCS | Mod: HHH

## 2025-08-07 SDOH — HEALTH STABILITY: MENTAL HEALTH: SMOKING IN HOME: 1

## 2025-08-07 SDOH — ECONOMIC STABILITY: HOUSING INSECURITY: EVIDENCE OF SMOKING MATERIAL: 1

## 2025-08-07 ASSESSMENT — ACTIVITIES OF DAILY LIVING (ADL)
GROOMING_CURRENT_FUNCTION: MINIMUM ASSIST
TOILETING: MINIMUM ASSIST
GROOMING_CURRENT_FUNCTION: CONTACT GUARD ASSIST
DRESSING_LB_CURRENT_FUNCTION: MINIMUM ASSIST
GROOMING ASSESSED: 1
TOILETING: 1

## 2025-08-07 ASSESSMENT — ENCOUNTER SYMPTOMS
SUBJECTIVE PAIN PROGRESSION: GRADUALLY IMPROVING
PERSON REPORTING PAIN: PATIENT
DENIES PAIN: 1

## 2025-08-07 ASSESSMENT — PAIN SCALES - PAIN ASSESSMENT IN ADVANCED DEMENTIA (PAINAD)
NEGVOCALIZATION: 0 - NONE.
NEGVOCALIZATION: 0
BREATHING: 0
FACIALEXPRESSION: 0
CONSOLABILITY: 0
TOTALSCORE: 0
BODYLANGUAGE: 0
FACIALEXPRESSION: 0 - SMILING OR INEXPRESSIVE.
CONSOLABILITY: 0 - NO NEED TO CONSOLE.
BODYLANGUAGE: 0 - RELAXED.

## 2025-08-11 ENCOUNTER — HOME CARE VISIT (OUTPATIENT)
Dept: HOME HEALTH SERVICES | Facility: HOME HEALTH | Age: OVER 89
End: 2025-08-11
Payer: COMMERCIAL

## 2025-08-11 PROCEDURE — G0157 HHC PT ASSISTANT EA 15: HCPCS | Mod: CQ,HHH

## 2025-08-11 ASSESSMENT — ENCOUNTER SYMPTOMS
DENIES PAIN: 1
PERSON REPORTING PAIN: PATIENT

## 2025-08-13 ENCOUNTER — HOME CARE VISIT (OUTPATIENT)
Dept: HOME HEALTH SERVICES | Facility: HOME HEALTH | Age: OVER 89
End: 2025-08-13
Payer: COMMERCIAL

## 2025-08-13 VITALS
TEMPERATURE: 97.7 F | BODY MASS INDEX: 20.29 KG/M2 | OXYGEN SATURATION: 93 % | HEART RATE: 68 BPM | WEIGHT: 107.4 LBS | RESPIRATION RATE: 18 BRPM

## 2025-08-13 PROCEDURE — G0300 HHS/HOSPICE OF LPN EA 15 MIN: HCPCS | Mod: HHH

## 2025-08-13 PROCEDURE — G0152 HHCP-SERV OF OT,EA 15 MIN: HCPCS | Mod: HHH

## 2025-08-13 SDOH — ECONOMIC STABILITY: GENERAL

## 2025-08-13 ASSESSMENT — ACTIVITIES OF DAILY LIVING (ADL)
BATHING_CURRENT_FUNCTION: MODERATE ASSIST
BATHING ASSESSED: 1
MONEY MANAGEMENT (EXPENSES/BILLS): INDEPENDENT
DRESSING_LB_CURRENT_FUNCTION: MINIMUM ASSIST
BATHING_CURRENT_FUNCTION: MINIMUM ASSIST
TOILETING: MINIMUM ASSIST
TOILETING: 1
DRESSING_LB_CURRENT_FUNCTION: MODERATE ASSIST

## 2025-08-13 ASSESSMENT — PAIN SCALES - PAIN ASSESSMENT IN ADVANCED DEMENTIA (PAINAD)
FACIALEXPRESSION: 0 - SMILING OR INEXPRESSIVE.
BREATHING: 0
CONSOLABILITY: 0 - NO NEED TO CONSOLE.
BODYLANGUAGE: 0
BODYLANGUAGE: 0 - RELAXED.
TOTALSCORE: 0
FACIALEXPRESSION: 0
NEGVOCALIZATION: 0 - NONE.
CONSOLABILITY: 0
NEGVOCALIZATION: 0

## 2025-08-13 ASSESSMENT — ENCOUNTER SYMPTOMS
APPETITE LEVEL: GOOD
PAIN: 1
PERSON REPORTING PAIN: PATIENT
HIGHEST PAIN SEVERITY IN PAST 24 HOURS: 4/10
DENIES PAIN: 1
LAST BOWEL MOVEMENT: 67427
PAIN LOCATION: RIGHT SHOULDER
PAIN LOCATION - PAIN SEVERITY: 3/10
SUBJECTIVE PAIN PROGRESSION: GRADUALLY IMPROVING
LOWEST PAIN SEVERITY IN PAST 24 HOURS: 3/10
PAIN SEVERITY GOAL: 2/10

## 2025-08-15 ENCOUNTER — HOME CARE VISIT (OUTPATIENT)
Dept: HOME HEALTH SERVICES | Facility: HOME HEALTH | Age: OVER 89
End: 2025-08-15
Payer: COMMERCIAL

## 2025-08-15 VITALS — TEMPERATURE: 97 F

## 2025-08-15 PROCEDURE — G0152 HHCP-SERV OF OT,EA 15 MIN: HCPCS | Mod: HHH

## 2025-08-15 SDOH — ECONOMIC STABILITY: HOUSING INSECURITY: EVIDENCE OF SMOKING MATERIAL: 0

## 2025-08-15 SDOH — HEALTH STABILITY: MENTAL HEALTH: SMOKING IN HOME: 0

## 2025-08-15 ASSESSMENT — ACTIVITIES OF DAILY LIVING (ADL)
TOILETING: 1
TOILETING: MODERATE ASSIST
BATHING ASSESSED: 1
DRESSING_UB_CURRENT_FUNCTION: CONTACT GUARD ASSIST
BATHING_CURRENT_FUNCTION: MODERATE ASSIST
DRESSING_UB_CURRENT_FUNCTION: MINIMUM ASSIST
TOILETING: MINIMUM ASSIST

## 2025-08-15 ASSESSMENT — PAIN SCALES - PAIN ASSESSMENT IN ADVANCED DEMENTIA (PAINAD)
NEGVOCALIZATION: 0 - NONE.
BREATHING: 0
BODYLANGUAGE: 0
FACIALEXPRESSION: 0 - SMILING OR INEXPRESSIVE.
CONSOLABILITY: 0
CONSOLABILITY: 0 - NO NEED TO CONSOLE.
FACIALEXPRESSION: 0
NEGVOCALIZATION: 0
TOTALSCORE: 0
BODYLANGUAGE: 0 - RELAXED.

## 2025-08-15 ASSESSMENT — ENCOUNTER SYMPTOMS
PAIN LOCATION: RIGHT SHOULDER
PAIN LOCATION - PAIN SEVERITY: 3/10
PAIN: 1
PERSON REPORTING PAIN: PATIENT

## 2025-08-18 ENCOUNTER — HOME CARE VISIT (OUTPATIENT)
Dept: HOME HEALTH SERVICES | Facility: HOME HEALTH | Age: OVER 89
End: 2025-08-18
Payer: COMMERCIAL

## 2025-08-18 VITALS — DIASTOLIC BLOOD PRESSURE: 76 MMHG | SYSTOLIC BLOOD PRESSURE: 126 MMHG

## 2025-08-18 PROCEDURE — G0152 HHCP-SERV OF OT,EA 15 MIN: HCPCS | Mod: HHH

## 2025-08-18 SDOH — HEALTH STABILITY: MENTAL HEALTH: SMOKING IN HOME: 0

## 2025-08-18 SDOH — ECONOMIC STABILITY: HOUSING INSECURITY: EVIDENCE OF SMOKING MATERIAL: 0

## 2025-08-18 ASSESSMENT — ENCOUNTER SYMPTOMS
DENIES PAIN: 1
PERSON REPORTING PAIN: PATIENT

## 2025-08-18 ASSESSMENT — PAIN SCALES - PAIN ASSESSMENT IN ADVANCED DEMENTIA (PAINAD)
FACIALEXPRESSION: 0 - SMILING OR INEXPRESSIVE.
NEGVOCALIZATION: 0 - NONE.
CONSOLABILITY: 0 - NO NEED TO CONSOLE.
FACIALEXPRESSION: 0
TOTALSCORE: 0
BODYLANGUAGE: 0
CONSOLABILITY: 0
NEGVOCALIZATION: 0
BREATHING: 0
BODYLANGUAGE: 0 - RELAXED.

## 2025-08-20 ENCOUNTER — HOME CARE VISIT (OUTPATIENT)
Dept: HOME HEALTH SERVICES | Facility: HOME HEALTH | Age: OVER 89
End: 2025-08-20
Payer: COMMERCIAL

## 2025-08-20 VITALS
RESPIRATION RATE: 20 BRPM | DIASTOLIC BLOOD PRESSURE: 68 MMHG | SYSTOLIC BLOOD PRESSURE: 110 MMHG | OXYGEN SATURATION: 94 % | TEMPERATURE: 97.7 F | HEART RATE: 81 BPM

## 2025-08-20 PROCEDURE — G0300 HHS/HOSPICE OF LPN EA 15 MIN: HCPCS | Mod: HHH

## 2025-08-21 ENCOUNTER — HOME CARE VISIT (OUTPATIENT)
Dept: HOME HEALTH SERVICES | Facility: HOME HEALTH | Age: OVER 89
End: 2025-08-21
Payer: COMMERCIAL

## 2025-08-21 VITALS — TEMPERATURE: 97 F

## 2025-08-21 PROCEDURE — G0151 HHCP-SERV OF PT,EA 15 MIN: HCPCS | Mod: HHH

## 2025-08-21 PROCEDURE — G0152 HHCP-SERV OF OT,EA 15 MIN: HCPCS | Mod: HHH

## 2025-08-21 SDOH — HEALTH STABILITY: PHYSICAL HEALTH: EXERCISE TYPE: SUPINE EXS X 10 REPS , STANDING EXS MARCHING AND HIP ADD/ABD X 10 REPS

## 2025-08-21 SDOH — HEALTH STABILITY: MENTAL HEALTH: SMOKING IN HOME: 1

## 2025-08-21 SDOH — ECONOMIC STABILITY: HOUSING INSECURITY: EVIDENCE OF SMOKING MATERIAL: 0

## 2025-08-21 SDOH — HEALTH STABILITY: MENTAL HEALTH: SMOKING IN HOME: 0

## 2025-08-21 SDOH — ECONOMIC STABILITY: GENERAL

## 2025-08-21 ASSESSMENT — ENCOUNTER SYMPTOMS
PAIN: 1
CHANGE IN APPETITE: UNCHANGED
OCCASIONAL FEELINGS OF UNSTEADINESS: 0
LAST BOWEL MOVEMENT: 67437
SUBJECTIVE PAIN PROGRESSION: GRADUALLY IMPROVING
PAIN LOCATION - PAIN SEVERITY: 5/10
APPETITE LEVEL: GOOD
PERSON REPORTING PAIN: PATIENT
HIGHEST PAIN SEVERITY IN PAST 24 HOURS: 7/10
DENIES PAIN: 1
PERSON REPORTING PAIN: PATIENT
PAIN LOCATION: RIGHT SHOULDER
DENIES PAIN: 1
PAIN SEVERITY GOAL: 5/10
LOWEST PAIN SEVERITY IN PAST 24 HOURS: 6/10

## 2025-08-21 ASSESSMENT — PAIN SCALES - PAIN ASSESSMENT IN ADVANCED DEMENTIA (PAINAD)
BODYLANGUAGE: 0 - RELAXED.
CONSOLABILITY: 0
BODYLANGUAGE: 0
NEGVOCALIZATION: 0 - NONE.
BREATHING: 0
CONSOLABILITY: 0 - NO NEED TO CONSOLE.
TOTALSCORE: 0
FACIALEXPRESSION: 0
FACIALEXPRESSION: 0 - SMILING OR INEXPRESSIVE.
NEGVOCALIZATION: 0

## 2025-08-21 ASSESSMENT — ACTIVITIES OF DAILY LIVING (ADL)
MONEY MANAGEMENT (EXPENSES/BILLS): INDEPENDENT
TOILETING: 1
DRESSING_UB_CURRENT_FUNCTION: MINIMUM ASSIST
BATHING ASSESSED: 1
DRESSING_LB_CURRENT_FUNCTION: MINIMUM ASSIST
TOILETING: MINIMUM ASSIST
AMBULATION ASSISTANCE ON FLAT SURFACES: 1
BATHING_CURRENT_FUNCTION: MODERATE ASSIST

## 2025-08-26 ENCOUNTER — HOME CARE VISIT (OUTPATIENT)
Dept: HOME HEALTH SERVICES | Facility: HOME HEALTH | Age: OVER 89
End: 2025-08-26
Payer: COMMERCIAL

## 2025-08-26 PROCEDURE — G0157 HHC PT ASSISTANT EA 15: HCPCS | Mod: CQ,HHH

## 2025-08-26 ASSESSMENT — ENCOUNTER SYMPTOMS
PERSON REPORTING PAIN: PATIENT
DENIES PAIN: 1

## 2025-08-27 ENCOUNTER — HOME CARE VISIT (OUTPATIENT)
Dept: HOME HEALTH SERVICES | Facility: HOME HEALTH | Age: OVER 89
End: 2025-08-27
Payer: COMMERCIAL

## 2025-08-27 VITALS — TEMPERATURE: 98 F

## 2025-08-27 PROCEDURE — G0299 HHS/HOSPICE OF RN EA 15 MIN: HCPCS | Mod: HHH

## 2025-08-27 PROCEDURE — G0152 HHCP-SERV OF OT,EA 15 MIN: HCPCS | Mod: HHH

## 2025-08-27 SDOH — ECONOMIC STABILITY: HOUSING INSECURITY: EVIDENCE OF SMOKING MATERIAL: 0

## 2025-08-27 SDOH — HEALTH STABILITY: MENTAL HEALTH: SMOKING IN HOME: 1

## 2025-08-27 ASSESSMENT — PAIN SCALES - PAIN ASSESSMENT IN ADVANCED DEMENTIA (PAINAD)
NEGVOCALIZATION: 0 - NONE.
CONSOLABILITY: 0
FACIALEXPRESSION: 0 - SMILING OR INEXPRESSIVE.
BREATHING: 0
FACIALEXPRESSION: 0
BODYLANGUAGE: 0 - RELAXED.
TOTALSCORE: 0
BODYLANGUAGE: 0
CONSOLABILITY: 0 - NO NEED TO CONSOLE.
NEGVOCALIZATION: 0

## 2025-08-27 ASSESSMENT — ACTIVITIES OF DAILY LIVING (ADL)
DRESSING_LB_CURRENT_FUNCTION: MINIMUM ASSIST
TOILETING: MINIMUM ASSIST
GROOMING ASSESSED: 1
DRESSING_LB_CURRENT_FUNCTION: MODERATE ASSIST
TOILETING: CONTACT GUARD ASSIST
GROOMING_CURRENT_FUNCTION: MINIMUM ASSIST
DRESSING_UB_CURRENT_FUNCTION: MINIMUM ASSIST
TOILETING: 1

## 2025-08-27 ASSESSMENT — ENCOUNTER SYMPTOMS
PERSON REPORTING PAIN: PATIENT
PAIN: 1

## 2025-08-28 VITALS
TEMPERATURE: 97.6 F | DIASTOLIC BLOOD PRESSURE: 60 MMHG | RESPIRATION RATE: 16 BRPM | SYSTOLIC BLOOD PRESSURE: 104 MMHG | OXYGEN SATURATION: 95 % | HEART RATE: 68 BPM

## 2025-08-28 SDOH — ECONOMIC STABILITY: HOUSING INSECURITY: EVIDENCE OF SMOKING MATERIAL: 0

## 2025-08-28 SDOH — HEALTH STABILITY: MENTAL HEALTH: SMOKING IN HOME: 0

## 2025-08-28 ASSESSMENT — PAIN SCALES - PAIN ASSESSMENT IN ADVANCED DEMENTIA (PAINAD)
NEGVOCALIZATION: 0 - NONE.
FACIALEXPRESSION: 0
CONSOLABILITY: 0
FACIALEXPRESSION: 0 - SMILING OR INEXPRESSIVE.
BODYLANGUAGE: 0
TOTALSCORE: 0
BREATHING: 0
CONSOLABILITY: 0 - NO NEED TO CONSOLE.
NEGVOCALIZATION: 0
BODYLANGUAGE: 0 - RELAXED.

## 2025-08-28 ASSESSMENT — ENCOUNTER SYMPTOMS
PAIN LOCATION - PAIN FREQUENCY: INTERMITTENT
PAIN LOCATION - PAIN QUALITY: ACHES
OCCASIONAL FEELINGS OF UNSTEADINESS: 1
PAIN LOCATION: GENERALIZED
HIGHEST PAIN SEVERITY IN PAST 24 HOURS: 3/10
MUSCLE WEAKNESS: 1
PERSON REPORTING PAIN: PATIENT
DEPRESSION: 0
BOWEL PATTERN NORMAL: 1
LOWEST PAIN SEVERITY IN PAST 24 HOURS: 0/10
APPETITE LEVEL: GOOD
LAST BOWEL MOVEMENT: 67444
LOSS OF SENSATION IN FEET: 0
HYPERTENSION: 1
PAIN SEVERITY GOAL: 0/10
CHANGE IN APPETITE: INCREASED
PAIN LOCATION - PAIN SEVERITY: 2/10
PAIN: 1
STOOL FREQUENCY: LESS THAN DAILY

## 2025-08-28 ASSESSMENT — ACTIVITIES OF DAILY LIVING (ADL): AMBULATION ASSISTANCE: STAND BY ASSIST

## 2025-08-29 ENCOUNTER — HOME CARE VISIT (OUTPATIENT)
Dept: HOME HEALTH SERVICES | Facility: HOME HEALTH | Age: OVER 89
End: 2025-08-29
Payer: COMMERCIAL

## 2025-08-29 VITALS — TEMPERATURE: 97 F

## 2025-08-29 PROCEDURE — G0152 HHCP-SERV OF OT,EA 15 MIN: HCPCS | Mod: HHH

## 2025-08-29 SDOH — ECONOMIC STABILITY: HOUSING INSECURITY: EVIDENCE OF SMOKING MATERIAL: 0

## 2025-08-29 SDOH — HEALTH STABILITY: MENTAL HEALTH: SMOKING IN HOME: 1

## 2025-08-29 ASSESSMENT — ACTIVITIES OF DAILY LIVING (ADL)
TOILETING: MINIMUM ASSIST
ENTERING_EXITING_HOME: MODERATE ASSIST
TOILETING: 1
WASHING_UPB_CURRENT_FUNCTION: MODERATE ASSIST
DRESSING_UB_CURRENT_FUNCTION: MINIMUM ASSIST
WASHING_UPB_CURRENT_FUNCTION: MINIMUM ASSIST
BATHING_CURRENT_FUNCTION: MODERATE ASSIST
WASHING_HAIR_CURRENT_FUNCTION: MODERATE ASSIST
WASHING_BACK_CURRENT_FUNCTION: MODERATE ASSIST
CURRENT_FUNCTION: CONTACT GUARD ASSIST
PREPARING MEALS: DEPENDENT
BATHING ASSESSED: 1
WASHING_LB_CURRENT_FUNCTION: MODERATE ASSIST
HAIR_CARE_ASSESSED: 1
DRESSING_LB_CURRENT_FUNCTION: MODERATE ASSIST
GROOMING_CURRENT_FUNCTION: MINIMUM ASSIST
HAIR_CARE_MAXIMUM_ASSIST: 1
BATHING_CURRENT_FUNCTION: MAXIMUM ASSIST
GROOMING ASSESSED: 1
HAIR_CARE_MODERATE_ASSIST: 1
OASIS_M1830: 02
PHYSICAL TRANSFERS ASSESSED: 1

## 2025-08-29 ASSESSMENT — PAIN SCALES - PAIN ASSESSMENT IN ADVANCED DEMENTIA (PAINAD)
FACIALEXPRESSION: 0 - SMILING OR INEXPRESSIVE.
FACIALEXPRESSION: 0
BREATHING: 0
NEGVOCALIZATION: 0 - NONE.
CONSOLABILITY: 0 - NO NEED TO CONSOLE.
NEGVOCALIZATION: 0
CONSOLABILITY: 0
BODYLANGUAGE: 0 - RELAXED.
TOTALSCORE: 0
BODYLANGUAGE: 0

## 2025-08-29 ASSESSMENT — ENCOUNTER SYMPTOMS
PAIN: 1
SUBJECTIVE PAIN PROGRESSION: GRADUALLY IMPROVING
HIGHEST PAIN SEVERITY IN PAST 24 HOURS: 5/10
PAIN LOCATION - PAIN SEVERITY: 4/10
PAIN LOCATION: RIGHT SHOULDER
PERSON REPORTING PAIN: PATIENT
LOWEST PAIN SEVERITY IN PAST 24 HOURS: 4/10
ANGER WITHIN DEFINED LIMITS: 1
AGGRESSION WITHIN DEFINED LIMITS: 1
PAIN SEVERITY GOAL: 3/10

## 2025-09-03 ENCOUNTER — HOME CARE VISIT (OUTPATIENT)
Dept: HOME HEALTH SERVICES | Facility: HOME HEALTH | Age: OVER 89
End: 2025-09-03
Payer: COMMERCIAL

## 2025-09-03 VITALS — SYSTOLIC BLOOD PRESSURE: 111 MMHG | HEART RATE: 63 BPM | DIASTOLIC BLOOD PRESSURE: 78 MMHG

## 2025-09-03 PROCEDURE — G0152 HHCP-SERV OF OT,EA 15 MIN: HCPCS | Mod: HHH

## 2025-09-03 SDOH — ECONOMIC STABILITY: HOUSING INSECURITY: EVIDENCE OF SMOKING MATERIAL: 0

## 2025-09-03 SDOH — HEALTH STABILITY: MENTAL HEALTH: SMOKING IN HOME: 0

## 2025-09-03 ASSESSMENT — ENCOUNTER SYMPTOMS
PERSON REPORTING PAIN: PATIENT
DENIES PAIN: 1

## 2025-09-03 ASSESSMENT — PAIN SCALES - PAIN ASSESSMENT IN ADVANCED DEMENTIA (PAINAD)
BREATHING: 0
FACIALEXPRESSION: 0 - SMILING OR INEXPRESSIVE.
BODYLANGUAGE: 0 - RELAXED.
NEGVOCALIZATION: 0
NEGVOCALIZATION: 0 - NONE.
CONSOLABILITY: 0
TOTALSCORE: 0
CONSOLABILITY: 0 - NO NEED TO CONSOLE.
BODYLANGUAGE: 0
FACIALEXPRESSION: 0

## 2025-09-03 ASSESSMENT — ACTIVITIES OF DAILY LIVING (ADL)
BATHING_CURRENT_FUNCTION: MODERATE ASSIST
BATHING_CURRENT_FUNCTION: MINIMUM ASSIST
TOILETING: 1
TOILETING: CONTACT GUARD ASSIST
BATHING ASSESSED: 1
DRESSING_LB_CURRENT_FUNCTION: MINIMUM ASSIST

## 2025-09-05 ENCOUNTER — HOME CARE VISIT (OUTPATIENT)
Dept: HOME HEALTH SERVICES | Facility: HOME HEALTH | Age: OVER 89
End: 2025-09-05
Payer: COMMERCIAL

## 2025-09-05 VITALS — SYSTOLIC BLOOD PRESSURE: 123 MMHG | DIASTOLIC BLOOD PRESSURE: 71 MMHG

## 2025-09-05 PROCEDURE — G0152 HHCP-SERV OF OT,EA 15 MIN: HCPCS | Mod: HHH

## 2025-09-05 SDOH — ECONOMIC STABILITY: HOUSING INSECURITY: EVIDENCE OF SMOKING MATERIAL: 0

## 2025-09-05 SDOH — HEALTH STABILITY: MENTAL HEALTH: SMOKING IN HOME: 0

## 2025-09-05 ASSESSMENT — ACTIVITIES OF DAILY LIVING (ADL)
BATHING_CURRENT_FUNCTION: MINIMUM ASSIST
BATHING ASSESSED: 1
LIGHT HOUSEKEEPING: NEEDS ASSISTANCE
HOUSEKEEPING ASSESSED: 1

## 2025-09-05 ASSESSMENT — PAIN SCALES - PAIN ASSESSMENT IN ADVANCED DEMENTIA (PAINAD)
CONSOLABILITY: 0
NEGVOCALIZATION: 0
FACIALEXPRESSION: 0
BREATHING: 0
TOTALSCORE: 0
FACIALEXPRESSION: 0 - SMILING OR INEXPRESSIVE.
BODYLANGUAGE: 0
BODYLANGUAGE: 0 - RELAXED.
CONSOLABILITY: 0 - NO NEED TO CONSOLE.
NEGVOCALIZATION: 0 - NONE.

## 2025-09-05 ASSESSMENT — ENCOUNTER SYMPTOMS
PAIN SEVERITY GOAL: 0/10
PAIN LOCATION - PAIN SEVERITY: 1/10
PAIN LOCATION: RIGHT SHOULDER
HIGHEST PAIN SEVERITY IN PAST 24 HOURS: 1/10
PERSON REPORTING PAIN: PATIENT
PAIN: 1